# Patient Record
Sex: FEMALE | Race: WHITE | Employment: OTHER | URBAN - METROPOLITAN AREA
[De-identification: names, ages, dates, MRNs, and addresses within clinical notes are randomized per-mention and may not be internally consistent; named-entity substitution may affect disease eponyms.]

---

## 2017-05-06 ENCOUNTER — HOSPITAL ENCOUNTER (OUTPATIENT)
Dept: CT IMAGING | Age: 67
Discharge: HOME | End: 2017-05-06
Attending: INTERNAL MEDICINE
Payer: MEDICARE

## 2017-05-06 DIAGNOSIS — J32.9 CHRONIC SINUSITIS: ICD-10-CM

## 2017-05-06 PROCEDURE — 70486 CT MAXILLOFACIAL W/O DYE: CPT

## 2017-10-27 ENCOUNTER — APPOINTMENT (OUTPATIENT)
Dept: INFUSION THERAPY | Age: 67
End: 2017-10-27

## 2017-11-22 ENCOUNTER — HOSPITAL ENCOUNTER (OUTPATIENT)
Dept: INFUSION THERAPY | Age: 67
Discharge: HOME | End: 2017-11-22
Payer: MEDICARE

## 2017-11-22 VITALS
RESPIRATION RATE: 16 BRPM | HEIGHT: 64 IN | TEMPERATURE: 97.8 F | SYSTOLIC BLOOD PRESSURE: 123 MMHG | HEART RATE: 86 BPM | BODY MASS INDEX: 21.08 KG/M2 | DIASTOLIC BLOOD PRESSURE: 68 MMHG | WEIGHT: 123.5 LBS

## 2017-11-22 PROCEDURE — 74011250637 HC RX REV CODE- 250/637: Performed by: ALLERGY & IMMUNOLOGY

## 2017-11-22 PROCEDURE — 74011250636 HC RX REV CODE- 250/636: Performed by: ALLERGY & IMMUNOLOGY

## 2017-11-22 PROCEDURE — 96365 THER/PROPH/DIAG IV INF INIT: CPT

## 2017-11-22 PROCEDURE — 96366 THER/PROPH/DIAG IV INF ADDON: CPT

## 2017-11-22 RX ORDER — DIPHENHYDRAMINE HYDROCHLORIDE 50 MG/ML
50 INJECTION, SOLUTION INTRAMUSCULAR; INTRAVENOUS
Status: ACTIVE | OUTPATIENT
Start: 2017-11-22 | End: 2017-11-22

## 2017-11-22 RX ORDER — ACETAMINOPHEN 325 MG/1
650 TABLET ORAL ONCE
Status: COMPLETED | OUTPATIENT
Start: 2017-11-22 | End: 2017-11-22

## 2017-11-22 RX ORDER — METFORMIN HYDROCHLORIDE 750 MG/1
750 TABLET, EXTENDED RELEASE ORAL
COMMUNITY

## 2017-11-22 RX ORDER — LANOLIN ALCOHOL/MO/W.PET/CERES
400 CREAM (GRAM) TOPICAL DAILY
COMMUNITY

## 2017-11-22 RX ORDER — SODIUM CHLORIDE 0.9 % (FLUSH) 0.9 %
10-40 SYRINGE (ML) INJECTION AS NEEDED
Status: DISCONTINUED | OUTPATIENT
Start: 2017-11-22 | End: 2017-11-26 | Stop reason: HOSPADM

## 2017-11-22 RX ORDER — CETIRIZINE HCL 10 MG
10 TABLET ORAL DAILY
COMMUNITY

## 2017-11-22 RX ORDER — DIPHENHYDRAMINE HCL 25 MG
25 CAPSULE ORAL ONCE
Status: COMPLETED | OUTPATIENT
Start: 2017-11-22 | End: 2017-11-22

## 2017-11-22 RX ORDER — MONTELUKAST SODIUM 10 MG/1
10 TABLET ORAL DAILY
COMMUNITY

## 2017-11-22 RX ORDER — RANITIDINE 150 MG/1
150 TABLET, FILM COATED ORAL 2 TIMES DAILY
COMMUNITY
End: 2019-07-11

## 2017-11-22 RX ORDER — ACETAMINOPHEN 325 MG/1
650 TABLET ORAL
Status: ACTIVE | OUTPATIENT
Start: 2017-11-22 | End: 2017-11-22

## 2017-11-22 RX ADMIN — IMMUNE GLOBULIN INTRAVENOUS (HUMAN) 10 G: 5 INJECTION, SOLUTION INTRAVENOUS at 13:35

## 2017-11-22 RX ADMIN — IMMUNE GLOBULIN INTRAVENOUS (HUMAN) 20 G: 5 INJECTION, SOLUTION INTRAVENOUS at 11:45

## 2017-11-22 RX ADMIN — ACETAMINOPHEN 650 MG: 325 TABLET ORAL at 11:03

## 2017-11-22 RX ADMIN — DIPHENHYDRAMINE HYDROCHLORIDE 25 MG: 25 CAPSULE ORAL at 11:03

## 2017-11-22 RX ADMIN — Medication 10 ML: at 10:55

## 2017-11-22 RX ADMIN — Medication 10 ML: at 11:44

## 2017-11-22 RX ADMIN — Medication 20 ML: at 14:10

## 2017-11-22 NOTE — PROGRESS NOTES
[facility] OPI Progress Note    Date: 2017    Name: Mandie Parnell    MRN: [id number]         : 1950      Ms. Ezekiel Olivera arrived in the [facility] today, at 56, in stable condition, here for Dose # 1, IVIG Infusion. She was assessed and education was provided. Ms. Rozina Mao vitals were reviewed. Visit Vitals    /73 (BP 1 Location: Left arm, BP Patient Position: At rest;Sitting)    Pulse 81    Temp 98.1 °F (36.7 °C)    Resp 16    Ht 5' 4\" (1.626 m)    Wt 56 kg (123 lb 8 oz)    Breastfeeding No    BMI 21.2 kg/m2             PIV was established in her left AC at 1055, without incident. Pre-medications consisting of PO Tylenol 650 mg & PO Benadryl 25 mg, were administered per order, and without incident. IVIG 30 grams, was administered IV, per order, and without incident. After completion of the IVIG, the PIV was flushed very well per protocol, and then, the PIV was removed and gauze/bandaid was applied. Ms. Ezekiel Olivera tolerated well, and had no complaints. Ms. Ezekiel Olivera was discharged from [place] [address] in stable condition at 1430Nava Lambert She is to return in 4 weeks, on Wednesday, 17,  at 1000,  for her next appointment, for Dose # 2, IVIG Infusion.      Ava Day RN  2017  10:48 AM

## 2017-12-18 RX ORDER — DIPHENHYDRAMINE HCL 25 MG
25 CAPSULE ORAL ONCE
Status: CANCELLED | OUTPATIENT
Start: 2017-12-20 | End: 2017-12-20

## 2017-12-18 RX ORDER — ACETAMINOPHEN 325 MG/1
650 TABLET ORAL ONCE
Status: CANCELLED | OUTPATIENT
Start: 2017-12-20 | End: 2017-12-20

## 2017-12-20 ENCOUNTER — HOSPITAL ENCOUNTER (OUTPATIENT)
Dept: INFUSION THERAPY | Age: 67
Discharge: HOME | End: 2017-12-20
Payer: MEDICARE

## 2017-12-20 VITALS
HEIGHT: 64 IN | TEMPERATURE: 98.1 F | BODY MASS INDEX: 20.49 KG/M2 | OXYGEN SATURATION: 100 % | SYSTOLIC BLOOD PRESSURE: 144 MMHG | RESPIRATION RATE: 16 BRPM | DIASTOLIC BLOOD PRESSURE: 81 MMHG | HEART RATE: 81 BPM | WEIGHT: 120 LBS

## 2017-12-20 PROCEDURE — 74011250636 HC RX REV CODE- 250/636: Performed by: ALLERGY & IMMUNOLOGY

## 2017-12-20 PROCEDURE — 96365 THER/PROPH/DIAG IV INF INIT: CPT

## 2017-12-20 PROCEDURE — 96366 THER/PROPH/DIAG IV INF ADDON: CPT

## 2017-12-20 RX ORDER — SODIUM CHLORIDE 0.9 % (FLUSH) 0.9 %
10-40 SYRINGE (ML) INJECTION AS NEEDED
Status: DISCONTINUED | OUTPATIENT
Start: 2017-12-20 | End: 2017-12-24 | Stop reason: HOSPADM

## 2017-12-20 RX ADMIN — IMMUNE GLOBULIN INTRAVENOUS (HUMAN) 10 G: 5 INJECTION, SOLUTION INTRAVENOUS at 11:46

## 2017-12-20 RX ADMIN — IMMUNE GLOBULIN INTRAVENOUS (HUMAN) 20 G: 5 INJECTION, SOLUTION INTRAVENOUS at 10:26

## 2017-12-20 RX ADMIN — IMMUNE GLOBULIN INTRAVENOUS (HUMAN) 10 G: 5 INJECTION, SOLUTION INTRAVENOUS at 12:02

## 2017-12-20 RX ADMIN — Medication 10 ML: at 12:07

## 2017-12-20 NOTE — PROGRESS NOTES
[facility] OPIC Progress Note    Date: 2017    Name: Chloé Sierra    MRN: [id number]         : 1950      Ms. Inés Mathis arrived to [facility] at 1000. Ms. Inés Mathis was assessed and education was provided. Ms. Antonette Moses vitals were reviewed. Visit Vitals    /81 (BP 1 Location: Right arm, BP Patient Position: At rest;Sitting)    Pulse 81    Temp 98.1 °F (36.7 °C)    Resp 16    Ht 5' 4\" (1.626 m)    Wt 54.4 kg (120 lb)    SpO2 100%    BMI 20.6 kg/m2     Patient Vitals for the past 12 hrs:   Temp Pulse Resp BP SpO2   17 1205 98.1 °F (36.7 °C) 81 16 144/81 -   17 1130 98.4 °F (36.9 °C) 81 16 136/84 100 %   17 1100 98.4 °F (36.9 °C) 81 16 134/83 100 %   17 1045 98 °F (36.7 °C) 75 16 131/82 99 %   17 1005 98.1 °F (36.7 °C) 80 16 142/78 100 %         24g IV inserted in patient's Left antecubital  X one attempt. Positive for blood return/ flushes without difficulty. Patient reports that she did not experience any adverse side effects after her first IVIG infusion. 30 g IVIG titrated and administered as ordered followed by NS flush. Ms. Inés Mathis tolerated well without complaints. IV removed/ intact. Gauze/ coban to site. Ms. Inés Mathis was discharged from [place] [address] in stable condition at 1210. She is to return on 18 at 0900 for her next appointment.     Una Crooks RN  2017

## 2017-12-26 ENCOUNTER — HOSPITAL ENCOUNTER (OUTPATIENT)
Dept: MAMMOGRAPHY | Age: 67
Discharge: HOME | End: 2017-12-26
Attending: INTERNAL MEDICINE
Payer: MEDICARE

## 2017-12-26 DIAGNOSIS — Z12.39 BREAST CANCER SCREENING: ICD-10-CM

## 2017-12-26 PROCEDURE — 77063 BREAST TOMOSYNTHESIS BI: CPT

## 2018-01-15 RX ORDER — DIPHENHYDRAMINE HYDROCHLORIDE 50 MG/ML
50 INJECTION, SOLUTION INTRAMUSCULAR; INTRAVENOUS
Status: CANCELLED | OUTPATIENT
Start: 2018-01-17 | End: 2018-01-17

## 2018-01-15 RX ORDER — DIPHENHYDRAMINE HCL 25 MG
25 CAPSULE ORAL ONCE
Status: CANCELLED | OUTPATIENT
Start: 2018-01-17 | End: 2018-01-17

## 2018-01-15 RX ORDER — ACETAMINOPHEN 325 MG/1
650 TABLET ORAL
Status: CANCELLED | OUTPATIENT
Start: 2018-01-17 | End: 2018-01-17

## 2018-01-15 RX ORDER — ACETAMINOPHEN 325 MG/1
650 TABLET ORAL ONCE
Status: CANCELLED | OUTPATIENT
Start: 2018-01-17 | End: 2018-01-17

## 2018-01-17 ENCOUNTER — HOSPITAL ENCOUNTER (OUTPATIENT)
Dept: INFUSION THERAPY | Age: 68
Discharge: HOME | End: 2018-01-17
Payer: MEDICARE

## 2018-01-17 VITALS
HEART RATE: 78 BPM | WEIGHT: 123.5 LBS | TEMPERATURE: 97.9 F | BODY MASS INDEX: 21.08 KG/M2 | RESPIRATION RATE: 18 BRPM | SYSTOLIC BLOOD PRESSURE: 131 MMHG | HEIGHT: 64 IN | OXYGEN SATURATION: 100 % | DIASTOLIC BLOOD PRESSURE: 82 MMHG

## 2018-01-17 PROCEDURE — 96366 THER/PROPH/DIAG IV INF ADDON: CPT

## 2018-01-17 PROCEDURE — 74011250637 HC RX REV CODE- 250/637: Performed by: ALLERGY & IMMUNOLOGY

## 2018-01-17 PROCEDURE — 74011250636 HC RX REV CODE- 250/636: Performed by: ALLERGY & IMMUNOLOGY

## 2018-01-17 PROCEDURE — 96365 THER/PROPH/DIAG IV INF INIT: CPT

## 2018-01-17 RX ORDER — DIPHENHYDRAMINE HCL 25 MG
25 CAPSULE ORAL ONCE
Status: COMPLETED | OUTPATIENT
Start: 2018-01-17 | End: 2018-01-17

## 2018-01-17 RX ORDER — ACETAMINOPHEN 325 MG/1
650 TABLET ORAL ONCE
Status: COMPLETED | OUTPATIENT
Start: 2018-01-17 | End: 2018-01-17

## 2018-01-17 RX ORDER — DIPHENHYDRAMINE HYDROCHLORIDE 50 MG/ML
50 INJECTION, SOLUTION INTRAMUSCULAR; INTRAVENOUS
Status: ACTIVE | OUTPATIENT
Start: 2018-01-17 | End: 2018-01-17

## 2018-01-17 RX ORDER — SODIUM CHLORIDE 0.9 % (FLUSH) 0.9 %
10-40 SYRINGE (ML) INJECTION AS NEEDED
Status: DISCONTINUED | OUTPATIENT
Start: 2018-01-17 | End: 2018-01-21 | Stop reason: HOSPADM

## 2018-01-17 RX ORDER — SODIUM CHLORIDE 9 MG/ML
25 INJECTION, SOLUTION INTRAVENOUS CONTINUOUS
Status: DISPENSED | OUTPATIENT
Start: 2018-01-17 | End: 2018-01-18

## 2018-01-17 RX ORDER — ACETAMINOPHEN 325 MG/1
650 TABLET ORAL
Status: ACTIVE | OUTPATIENT
Start: 2018-01-17 | End: 2018-01-17

## 2018-01-17 RX ADMIN — Medication 10 ML: at 08:45

## 2018-01-17 RX ADMIN — ACETAMINOPHEN 650 MG: 325 TABLET ORAL at 08:20

## 2018-01-17 RX ADMIN — Medication 10 ML: at 08:20

## 2018-01-17 RX ADMIN — IMMUNE GLOBULIN INTRAVENOUS (HUMAN) 10 G: 5 INJECTION, SOLUTION INTRAVENOUS at 08:44

## 2018-01-17 RX ADMIN — IMMUNE GLOBULIN INTRAVENOUS (HUMAN) 20 G: 5 INJECTION, SOLUTION INTRAVENOUS at 09:57

## 2018-01-17 RX ADMIN — DIPHENHYDRAMINE HYDROCHLORIDE 25 MG: 25 CAPSULE ORAL at 08:20

## 2018-01-17 NOTE — PROGRESS NOTES
[facility] OPIC Progress Note    Date: 2018    Name: Jessica Sullivan    MRN: [id number]         : 1950      Ms. Mariela Gutierrez arrived to [facility] at [address] [facility]. Ms. Mariela Gutierrez was assessed and education was provided. Ms. Mario Alberto Almazan vitals were reviewed. Visit Vitals    /82 (BP 1 Location: Left arm, BP Patient Position: Sitting)    Pulse 78    Temp 97.9 °F (36.6 °C)    Resp 18    Ht 5' 4\" (1.626 m)    Wt 56 kg (123 lb 8 oz)    SpO2 100%    Breastfeeding No    BMI 21.2 kg/m2     Patient Vitals for the past 12 hrs:   Temp Pulse Resp BP SpO2   18 1123 97.9 °F (36.6 °C) 78 18 131/82 100 %   18 1102 98 °F (36.7 °C) 72 18 130/78 100 %   18 1033 98 °F (36.7 °C) 90 18 142/79 95 %   18 0949 98.1 °F (36.7 °C) 72 18 133/81 99 %   18 0914 98 °F (36.7 °C) 71 18 143/78 97 %   18 0807 97.9 °F (36.6 °C) 75 18 136/78 98 %       22g IV inserted in patient's right antecubital  X one attempt. Positive for blood return/ flushes without difficulty. 30 g IVIG titrated and administered as ordered followed by NS flush. Ms. Mariela Gutierrez tolerated well without complaints. IV removed/ intact. Gauze/ coban to site. Ms. Mariela Gutierrez was discharged from [place] [address] in stable condition at 1130. She is to return on 18 at 0900 for her next appointment.     Anabel Coreas RN  2018

## 2018-02-14 ENCOUNTER — HOSPITAL ENCOUNTER (OUTPATIENT)
Dept: INFUSION THERAPY | Age: 68
Discharge: HOME | End: 2018-02-14
Payer: MEDICARE

## 2018-02-14 VITALS
DIASTOLIC BLOOD PRESSURE: 77 MMHG | OXYGEN SATURATION: 100 % | SYSTOLIC BLOOD PRESSURE: 139 MMHG | RESPIRATION RATE: 18 BRPM | TEMPERATURE: 98 F | HEART RATE: 78 BPM

## 2018-02-14 PROCEDURE — 74011250636 HC RX REV CODE- 250/636: Performed by: ALLERGY & IMMUNOLOGY

## 2018-02-14 PROCEDURE — 96366 THER/PROPH/DIAG IV INF ADDON: CPT

## 2018-02-14 PROCEDURE — 74011250637 HC RX REV CODE- 250/637: Performed by: ALLERGY & IMMUNOLOGY

## 2018-02-14 PROCEDURE — 82784 ASSAY IGA/IGD/IGG/IGM EACH: CPT | Performed by: ALLERGY & IMMUNOLOGY

## 2018-02-14 PROCEDURE — 96365 THER/PROPH/DIAG IV INF INIT: CPT

## 2018-02-14 RX ORDER — DIPHENHYDRAMINE HCL 25 MG
25 CAPSULE ORAL ONCE
Status: COMPLETED | OUTPATIENT
Start: 2018-02-14 | End: 2018-02-14

## 2018-02-14 RX ORDER — ACETAMINOPHEN 325 MG/1
650 TABLET ORAL ONCE
Status: COMPLETED | OUTPATIENT
Start: 2018-02-14 | End: 2018-02-14

## 2018-02-14 RX ORDER — DIPHENHYDRAMINE HYDROCHLORIDE 50 MG/ML
50 INJECTION, SOLUTION INTRAMUSCULAR; INTRAVENOUS
Status: ACTIVE | OUTPATIENT
Start: 2018-02-14 | End: 2018-02-14

## 2018-02-14 RX ORDER — ACETAMINOPHEN 325 MG/1
650 TABLET ORAL
Status: ACTIVE | OUTPATIENT
Start: 2018-02-14 | End: 2018-02-14

## 2018-02-14 RX ORDER — SODIUM CHLORIDE 0.9 % (FLUSH) 0.9 %
10-40 SYRINGE (ML) INJECTION AS NEEDED
Status: DISCONTINUED | OUTPATIENT
Start: 2018-02-14 | End: 2018-02-18 | Stop reason: HOSPADM

## 2018-02-14 RX ADMIN — ACETAMINOPHEN 650 MG: 325 TABLET ORAL at 09:44

## 2018-02-14 RX ADMIN — IMMUNE GLOBULIN INTRAVENOUS (HUMAN) 20 G: 5 INJECTION, SOLUTION INTRAVENOUS at 11:22

## 2018-02-14 RX ADMIN — IMMUNE GLOBULIN INTRAVENOUS (HUMAN) 10 G: 5 INJECTION, SOLUTION INTRAVENOUS at 10:10

## 2018-02-14 RX ADMIN — DIPHENHYDRAMINE HYDROCHLORIDE 25 MG: 25 CAPSULE ORAL at 09:44

## 2018-02-14 NOTE — PROGRESS NOTES
[facility] OPIC Progress Note    Date: 2018    Name: Echo Narvaez    MRN: [id number]         : 1950    IVIG    Ms. Lucio Hassan was assessed and education was provided. Ms. Eric Tirado vitals were reviewed and patient was observed for 5 minutes prior to treatment. Visit Vitals    /77 (BP 1 Location: Left arm, BP Patient Position: Sitting)    Pulse 84    Temp 97.9 °F (36.6 °C)    Resp 18    SpO2 98%    Breastfeeding No       22 g PIV placed in the left AC x 1 attempt. PIV flushed easily and had brisk blood return. Blood drawn off sent to lab for IGG level. Premedications (Tylenol 650 mg and Benadryl 25 mg) given 30 minutes prior to infusion. IVIG 30 G infused following ordered titration scheduled without any reaction. Patient Vitals for the past 12 hrs:   Temp Pulse Resp BP SpO2   18 1246 98 °F (36.7 °C) 78 18 139/77 100 %   18 1216 97.6 °F (36.4 °C) 78 18 131/78 100 %   18 1144 97.7 °F (36.5 °C) 75 18 130/78 100 %   18 1111 97.5 °F (36.4 °C) 72 18 120/79 100 %   18 1045 98.4 °F (36.9 °C) 72 19 123/78 100 %   18 0910 97.9 °F (36.6 °C) 84 18 157/77 98 %       VS remained stable at end of infusion and pt denied complaints. PIV flushed with NS 10 ml and removed. No bleeding or hematoma noted at site. Guaze and coban applied. Patient armband removed and shredded. Ms. Lucio Hassan was discharged from [place] [address] in stable condition at 1250. She is to return on 18 at 0900 for her next infusion.      Edi Menon RN  2018

## 2018-02-15 LAB — IGG SERPL-MCNC: 781 MG/DL (ref 700–1600)

## 2018-03-14 ENCOUNTER — APPOINTMENT (OUTPATIENT)
Dept: INFUSION THERAPY | Age: 68
End: 2018-03-14
Payer: MEDICARE

## 2018-03-16 ENCOUNTER — HOSPITAL ENCOUNTER (OUTPATIENT)
Dept: INFUSION THERAPY | Age: 68
Discharge: HOME | End: 2018-03-16
Payer: MEDICARE

## 2018-03-16 VITALS
RESPIRATION RATE: 18 BRPM | SYSTOLIC BLOOD PRESSURE: 127 MMHG | TEMPERATURE: 98.3 F | DIASTOLIC BLOOD PRESSURE: 70 MMHG | OXYGEN SATURATION: 100 % | HEART RATE: 79 BPM

## 2018-03-16 PROCEDURE — 74011250636 HC RX REV CODE- 250/636: Performed by: ALLERGY & IMMUNOLOGY

## 2018-03-16 PROCEDURE — 74011250636 HC RX REV CODE- 250/636

## 2018-03-16 PROCEDURE — 96366 THER/PROPH/DIAG IV INF ADDON: CPT

## 2018-03-16 PROCEDURE — 74011250637 HC RX REV CODE- 250/637: Performed by: ALLERGY & IMMUNOLOGY

## 2018-03-16 PROCEDURE — 96365 THER/PROPH/DIAG IV INF INIT: CPT

## 2018-03-16 RX ORDER — SODIUM CHLORIDE 0.9 % (FLUSH) 0.9 %
10-40 SYRINGE (ML) INJECTION AS NEEDED
Status: DISCONTINUED | OUTPATIENT
Start: 2018-03-16 | End: 2018-03-20 | Stop reason: HOSPADM

## 2018-03-16 RX ORDER — ACETAMINOPHEN 325 MG/1
650 TABLET ORAL ONCE
Status: COMPLETED | OUTPATIENT
Start: 2018-03-16 | End: 2018-03-16

## 2018-03-16 RX ORDER — DIPHENHYDRAMINE HCL 25 MG
25 CAPSULE ORAL
Status: DISCONTINUED | OUTPATIENT
Start: 2018-03-16 | End: 2018-03-20 | Stop reason: HOSPADM

## 2018-03-16 RX ORDER — CHLORPHENIRAMINE MALEATE 4 MG
4 TABLET ORAL
COMMUNITY

## 2018-03-16 RX ADMIN — ACETAMINOPHEN 650 MG: 325 TABLET ORAL at 09:51

## 2018-03-16 RX ADMIN — Medication 10 ML: at 12:43

## 2018-03-16 RX ADMIN — IMMUNE GLOBULIN INTRAVENOUS (HUMAN) 20 G: 5 INJECTION, SOLUTION INTRAVENOUS at 11:15

## 2018-03-16 RX ADMIN — DIPHENHYDRAMINE HYDROCHLORIDE 25 MG: 25 CAPSULE ORAL at 09:51

## 2018-03-16 NOTE — PROGRESS NOTES
[facility] OPIC Progress Note    Date: 2018    Name: Kayden Coy    MRN: [id number]         : 1950     IVIG infusion 5 of 13      Ms. Erica Acuna was assessed and education was provided. Denies any problems with infusions. States that her accucheck this morning was 12498 56 80 46    Ms. Orozco's vitals were reviewed and patient was observed for 5 minutes prior to treatment. Visit Vitals    /70 (BP 1 Location: Left arm, BP Patient Position: Sitting)    Pulse 79    Temp 98.3 °F (36.8 °C)    Resp 18    SpO2 100%    Breastfeeding No     Patient Vitals for the past 12 hrs:   Temp Pulse Resp BP SpO2   18 1243 98.3 °F (36.8 °C) 79 18 127/70 -   18 1143 98.2 °F (36.8 °C) 79 18 134/74 -   18 1110 98.1 °F (36.7 °C) 69 18 130/72 -   18 1038 98.1 °F (36.7 °C) 69 18 138/75 -   18 0922 98.3 °F (36.8 °C) 88 18 125/73 100 %     IV started in LAC w/o difficulty w/22 gauge INT. Hu Angry Good blood return obtained followed with 10 ml NS flush. .  No results found for this or any previous visit (from the past 12 hour(s)). Pre-medications of oral Tylenol 650 mg, oral Benadryl 25 mgwere administered as ordered and infusion was initiated. IVIG 30 grams was infused at 43 ml/hr x 30 minutes, 86 ml/hr x 30 minutes, 130 ml/hr x 30 minutes, 173 ml/hr x 30 minutes, 216 ml/hr until infusion completed. No s/s reaction noted. IV flushe dwith 10 ml NS and removed. No irritation noted at site, 2x2 guaze and Coban applied. Ms. Erica Acuna tolerated the infusion, and had no complaints. Patient armband removed and shredded. Ms. Erica Acuna was discharged from [place] [address] in stable condition at 1240. She is to return on 2018 at 0900 for her next appointment for IVIG infusion.     Leatha Weaver RN  2018  3:00 PM

## 2018-04-11 ENCOUNTER — APPOINTMENT (OUTPATIENT)
Dept: INFUSION THERAPY | Age: 68
End: 2018-04-11
Payer: MEDICARE

## 2018-04-16 ENCOUNTER — HOSPITAL ENCOUNTER (OUTPATIENT)
Dept: INFUSION THERAPY | Age: 68
Discharge: HOME | End: 2018-04-16
Payer: MEDICARE

## 2018-04-16 ENCOUNTER — APPOINTMENT (OUTPATIENT)
Dept: INFUSION THERAPY | Age: 68
End: 2018-04-16

## 2018-04-16 VITALS
OXYGEN SATURATION: 99 % | RESPIRATION RATE: 18 BRPM | SYSTOLIC BLOOD PRESSURE: 145 MMHG | DIASTOLIC BLOOD PRESSURE: 77 MMHG | HEART RATE: 76 BPM | TEMPERATURE: 97.8 F

## 2018-04-16 PROCEDURE — 74011250636 HC RX REV CODE- 250/636: Performed by: ALLERGY & IMMUNOLOGY

## 2018-04-16 PROCEDURE — 96365 THER/PROPH/DIAG IV INF INIT: CPT

## 2018-04-16 PROCEDURE — 74011250637 HC RX REV CODE- 250/637: Performed by: ALLERGY & IMMUNOLOGY

## 2018-04-16 PROCEDURE — 96366 THER/PROPH/DIAG IV INF ADDON: CPT

## 2018-04-16 RX ORDER — SODIUM CHLORIDE 0.9 % (FLUSH) 0.9 %
10-40 SYRINGE (ML) INJECTION AS NEEDED
Status: DISCONTINUED | OUTPATIENT
Start: 2018-04-16 | End: 2018-04-20 | Stop reason: HOSPADM

## 2018-04-16 RX ORDER — ACETAMINOPHEN 325 MG/1
650 TABLET ORAL ONCE
Status: COMPLETED | OUTPATIENT
Start: 2018-04-16 | End: 2018-04-16

## 2018-04-16 RX ORDER — ACETAMINOPHEN 325 MG/1
650 TABLET ORAL
Status: ACTIVE | OUTPATIENT
Start: 2018-04-16 | End: 2018-04-16

## 2018-04-16 RX ORDER — DIPHENHYDRAMINE HYDROCHLORIDE 50 MG/ML
50 INJECTION, SOLUTION INTRAMUSCULAR; INTRAVENOUS
Status: ACTIVE | OUTPATIENT
Start: 2018-04-16 | End: 2018-04-16

## 2018-04-16 RX ORDER — DIPHENHYDRAMINE HCL 25 MG
25 CAPSULE ORAL ONCE
Status: COMPLETED | OUTPATIENT
Start: 2018-04-16 | End: 2018-04-16

## 2018-04-16 RX ADMIN — DIPHENHYDRAMINE HYDROCHLORIDE 25 MG: 25 CAPSULE ORAL at 09:19

## 2018-04-16 RX ADMIN — IMMUNE GLOBULIN INTRAVENOUS (HUMAN) 20 G: 5 INJECTION, SOLUTION INTRAVENOUS at 11:11

## 2018-04-16 RX ADMIN — Medication 10 ML: at 09:20

## 2018-04-16 RX ADMIN — ACETAMINOPHEN 650 MG: 325 TABLET ORAL at 09:19

## 2018-04-16 RX ADMIN — IMMUNE GLOBULIN INTRAVENOUS (HUMAN) 10 G: 5 INJECTION, SOLUTION INTRAVENOUS at 09:56

## 2018-04-16 NOTE — PROGRESS NOTES
[facility] OPIC Progress Note    Date: 2018    Name: Yomi Thomas    MRN: [id number]         : 1950     IVIG infusion 6 of 13      Ms. Simona Johnson was assessed and education was provided. Denies any problems with infusions. Ms. Amanda Avina vitals were reviewed and patient was observed for 5 minutes prior to treatment. Visit Vitals    /79 (BP 1 Location: Left arm, BP Patient Position: Sitting)    Pulse 81    Temp 97.7 °F (36.5 °C)    Resp 18    SpO2 98%    Breastfeeding No     22G PIV inserted into the left AC x 1 attempt. Flushes without difficulty and positive for blood return. Pre-medications of oral Tylenol 650 mg, oral Benadryl 25 mgwere administered as ordered and infusion was initiated. IVIG 30 grams was infused at 43 ml/hr x 30 minutes, 86 ml/hr x 30 minutes, 130 ml/hr x 30 minutes, 173 ml/hr x 30 minutes, 216 ml/hr until infusion completed. No s/s reaction noted. IV flushed with 10 ml NS and removed. No irritation noted at site, 2x2 guaze and Coban applied. Ms. Simona Johnson tolerated the infusion, and had no complaints. Patient armband removed and shredded. Ms. Simona Johnson was discharged from [place] [address] in stable condition at 1250. She is to return on 2018 at 0800 for her next appointment for IVIG infusion.     Madai Duarte RN  2018

## 2018-04-19 ENCOUNTER — HOSPITAL ENCOUNTER (OUTPATIENT)
Dept: INFUSION THERAPY | Age: 68
End: 2018-04-19
Payer: MEDICARE

## 2018-05-09 RX ORDER — ACETAMINOPHEN 325 MG/1
650 TABLET ORAL
Status: CANCELLED | OUTPATIENT
Start: 2018-05-14 | End: 2018-05-14

## 2018-05-09 RX ORDER — DIPHENHYDRAMINE HCL 25 MG
25 CAPSULE ORAL ONCE
Status: CANCELLED | OUTPATIENT
Start: 2018-05-14 | End: 2018-05-14

## 2018-05-09 RX ORDER — DIPHENHYDRAMINE HYDROCHLORIDE 50 MG/ML
50 INJECTION, SOLUTION INTRAMUSCULAR; INTRAVENOUS
Status: CANCELLED | OUTPATIENT
Start: 2018-05-14 | End: 2018-05-14

## 2018-05-09 RX ORDER — ACETAMINOPHEN 325 MG/1
650 TABLET ORAL ONCE
Status: CANCELLED | OUTPATIENT
Start: 2018-05-14 | End: 2018-05-14

## 2018-05-14 ENCOUNTER — HOSPITAL ENCOUNTER (OUTPATIENT)
Dept: INFUSION THERAPY | Age: 68
Discharge: HOME | End: 2018-05-14
Payer: MEDICARE

## 2018-05-14 RX ORDER — ACETAMINOPHEN 325 MG/1
650 TABLET ORAL
Status: ACTIVE | OUTPATIENT
Start: 2018-05-14 | End: 2018-05-14

## 2018-05-14 RX ORDER — DIPHENHYDRAMINE HCL 25 MG
25 CAPSULE ORAL ONCE
Status: ACTIVE | OUTPATIENT
Start: 2018-05-14 | End: 2018-05-14

## 2018-05-14 RX ORDER — DIPHENHYDRAMINE HYDROCHLORIDE 50 MG/ML
50 INJECTION, SOLUTION INTRAMUSCULAR; INTRAVENOUS
Status: ACTIVE | OUTPATIENT
Start: 2018-05-14 | End: 2018-05-14

## 2018-05-14 RX ORDER — ACETAMINOPHEN 325 MG/1
650 TABLET ORAL ONCE
Status: ACTIVE | OUTPATIENT
Start: 2018-05-14 | End: 2018-05-14

## 2018-05-18 ENCOUNTER — HOSPITAL ENCOUNTER (OUTPATIENT)
Dept: INFUSION THERAPY | Age: 68
Discharge: HOME | End: 2018-05-18
Payer: MEDICARE

## 2018-05-18 ENCOUNTER — APPOINTMENT (OUTPATIENT)
Dept: INFUSION THERAPY | Age: 68
End: 2018-05-18
Payer: MEDICARE

## 2018-05-18 VITALS
OXYGEN SATURATION: 100 % | TEMPERATURE: 98.1 F | DIASTOLIC BLOOD PRESSURE: 72 MMHG | SYSTOLIC BLOOD PRESSURE: 121 MMHG | RESPIRATION RATE: 18 BRPM | HEART RATE: 77 BPM

## 2018-05-18 PROCEDURE — 74011250636 HC RX REV CODE- 250/636: Performed by: ALLERGY & IMMUNOLOGY

## 2018-05-18 PROCEDURE — 96366 THER/PROPH/DIAG IV INF ADDON: CPT

## 2018-05-18 PROCEDURE — 74011250637 HC RX REV CODE- 250/637: Performed by: ALLERGY & IMMUNOLOGY

## 2018-05-18 PROCEDURE — 96365 THER/PROPH/DIAG IV INF INIT: CPT

## 2018-05-18 RX ORDER — DIPHENHYDRAMINE HYDROCHLORIDE 50 MG/ML
50 INJECTION, SOLUTION INTRAMUSCULAR; INTRAVENOUS
Status: ACTIVE | OUTPATIENT
Start: 2018-05-18 | End: 2018-05-18

## 2018-05-18 RX ORDER — DIPHENHYDRAMINE HCL 25 MG
25 CAPSULE ORAL ONCE
Status: COMPLETED | OUTPATIENT
Start: 2018-05-18 | End: 2018-05-18

## 2018-05-18 RX ORDER — ACETAMINOPHEN 325 MG/1
650 TABLET ORAL
Status: ACTIVE | OUTPATIENT
Start: 2018-05-18 | End: 2018-05-18

## 2018-05-18 RX ORDER — SODIUM CHLORIDE 0.9 % (FLUSH) 0.9 %
10-40 SYRINGE (ML) INJECTION AS NEEDED
Status: DISCONTINUED | OUTPATIENT
Start: 2018-05-18 | End: 2018-05-22 | Stop reason: HOSPADM

## 2018-05-18 RX ORDER — ACETAMINOPHEN 325 MG/1
650 TABLET ORAL ONCE
Status: DISPENSED | OUTPATIENT
Start: 2018-05-18 | End: 2018-05-18

## 2018-05-18 RX ADMIN — IMMUNE GLOBULIN INTRAVENOUS (HUMAN) 20 G: 5 INJECTION, SOLUTION INTRAVENOUS at 09:59

## 2018-05-18 RX ADMIN — Medication 10 ML: at 08:35

## 2018-05-18 RX ADMIN — HUMAN IMMUNOGLOBULIN G 10 G: 10 LIQUID INTRAVENOUS at 08:52

## 2018-05-18 RX ADMIN — DIPHENHYDRAMINE HYDROCHLORIDE 25 MG: 25 CAPSULE ORAL at 08:41

## 2018-05-18 RX ADMIN — Medication 10 ML: at 11:32

## 2018-05-18 RX ADMIN — ACETAMINOPHEN 650 MG: 325 TABLET ORAL at 08:40

## 2018-05-18 NOTE — PROGRESS NOTES
[facility] OPIC Progress Note    Date: May 18, 2018    Name: Amari Khan    MRN: [id number]         : 1950     IVIG infusion every 4 weeks      Ms. Gladys Vaughn was assessed and education was provided. Denies any problems with infusions. Ms. Lucy Singleton vitals were reviewed and patient was observed for 5 minutes prior to treatment. Visit Vitals    /72 (BP 1 Location: Left arm, BP Patient Position: Sitting)    Pulse 77    Temp 98.1 °F (36.7 °C)    Resp 18    SpO2 100%    Breastfeeding No     Patient Vitals for the past 12 hrs:   Temp Pulse Resp BP SpO2   18 1132 - 77 18 121/72 -   18 1102 - 76 18 122/70 -   18 1031 - 71 18 115/70 -   18 1002 - 69 18 122/75 -   18 0925 98.1 °F (36.7 °C) 73 18 130/76 -   18 0816 98.1 °F (36.7 °C) 77 18 132/80 100 %     IV started in RAC w/ 22 gauge INT w/o difficulty. Good blood returned obtained, followed with 10 ml NS flush. No results found for this or any previous visit (from the past 12 hour(s)). Pre-medications of oral Tylenol 650 mg, oral Benadryl 25 mg were administered as ordered and IVIG was initiated. IV Immune Globulin 10 % 30 grams/ 300 ml was infused at 43 ml/hr x 30 minutes, 86 ml/hr x 30 minutes, 130 ml/hr x 30 minutes, 173 ml/hr x 30 minutes 216 ml/hr x 30 minutes. No s/s reaction noted. IV flushed with 10 ml NS and removed. No irritation or drainage noted at site, 2x2 gauze and Coban applied. Ms. Gladys Vaughn tolerated the infusion, and had no complaints. Patient armband removed and shredded. Ms. Gladys Vaughn was discharged from [place] [address] in stable condition at 1135. She is to return on 6/15/2018 at 0800 for her next appointment for IVIG infusion.     Patel Mckinley RN  May 18, 2018  12:22 PM

## 2018-06-15 ENCOUNTER — HOSPITAL ENCOUNTER (OUTPATIENT)
Dept: INFUSION THERAPY | Age: 68
Discharge: HOME | End: 2018-06-15
Payer: MEDICARE

## 2018-06-15 VITALS
RESPIRATION RATE: 18 BRPM | DIASTOLIC BLOOD PRESSURE: 77 MMHG | TEMPERATURE: 97.8 F | HEART RATE: 90 BPM | SYSTOLIC BLOOD PRESSURE: 146 MMHG | OXYGEN SATURATION: 100 %

## 2018-06-15 PROCEDURE — 96365 THER/PROPH/DIAG IV INF INIT: CPT

## 2018-06-15 PROCEDURE — 96366 THER/PROPH/DIAG IV INF ADDON: CPT

## 2018-06-15 PROCEDURE — 74011250636 HC RX REV CODE- 250/636: Performed by: ALLERGY & IMMUNOLOGY

## 2018-06-15 PROCEDURE — 74011250637 HC RX REV CODE- 250/637: Performed by: ALLERGY & IMMUNOLOGY

## 2018-06-15 RX ORDER — DIPHENHYDRAMINE HYDROCHLORIDE 50 MG/ML
50 INJECTION, SOLUTION INTRAMUSCULAR; INTRAVENOUS
Status: ACTIVE | OUTPATIENT
Start: 2018-06-15 | End: 2018-06-15

## 2018-06-15 RX ORDER — ACETAMINOPHEN 325 MG/1
650 TABLET ORAL
Status: ACTIVE | OUTPATIENT
Start: 2018-06-15 | End: 2018-06-15

## 2018-06-15 RX ORDER — DIPHENHYDRAMINE HCL 25 MG
25 CAPSULE ORAL ONCE
Status: COMPLETED | OUTPATIENT
Start: 2018-06-15 | End: 2018-06-15

## 2018-06-15 RX ORDER — ACETAMINOPHEN 325 MG/1
650 TABLET ORAL ONCE
Status: COMPLETED | OUTPATIENT
Start: 2018-06-15 | End: 2018-06-15

## 2018-06-15 RX ORDER — SODIUM CHLORIDE 0.9 % (FLUSH) 0.9 %
10-40 SYRINGE (ML) INJECTION AS NEEDED
Status: DISCONTINUED | OUTPATIENT
Start: 2018-06-15 | End: 2018-06-19 | Stop reason: HOSPADM

## 2018-06-15 RX ADMIN — IMMUNE GLOBULIN INTRAVENOUS (HUMAN) 10 G: 5 INJECTION, SOLUTION INTRAVENOUS at 11:00

## 2018-06-15 RX ADMIN — IMMUNE GLOBULIN INTRAVENOUS (HUMAN) 10 G: 5 INJECTION, SOLUTION INTRAVENOUS at 08:50

## 2018-06-15 RX ADMIN — ACETAMINOPHEN 650 MG: 325 TABLET ORAL at 08:49

## 2018-06-15 RX ADMIN — IMMUNE GLOBULIN INTRAVENOUS (HUMAN) 10 G: 5 INJECTION, SOLUTION INTRAVENOUS at 10:10

## 2018-06-15 RX ADMIN — Medication 10 ML: at 08:33

## 2018-06-15 RX ADMIN — Medication 20 ML: at 11:37

## 2018-06-15 RX ADMIN — DIPHENHYDRAMINE HYDROCHLORIDE 25 MG: 25 CAPSULE ORAL at 08:49

## 2018-06-15 NOTE — PROGRESS NOTES
[facility] OPIC Progress Note    Date: Belen 15, 2018    Name: Afshin Gross    MRN: [id number]         : 1950     IVIG infusion every 4 weeks      Ms. Lynn Banda was assessed and education was provided. States has had more energy lately. Ms. Arnaldo Aquino vitals were reviewed and patient was observed for 5 minutes prior to treatment. Visit Vitals    /77    Pulse 90    Temp 97.8 °F (36.6 °C)    Resp 18    SpO2 100%    Breastfeeding No     No results found for this or any previous visit (from the past 12 hour(s)). IV started with 22 gauge INT in LAC w/o difficulty. Good blood return obtained, followed with 10 ml NS flush. Pre-medications of oral Tylenol 650 mg, oral Benadryl 25 mg were administered as ordered and infusion was initiated. IVIG 10 % 30 grams/300 ml was infused at 43 ml/hr x 30 minutes, 86 ml/hr x 30 minutes, 130 ml/hr x 30 minutes, 173 ml/hr x 30 minutes, 216 ml/hr until infusion completed. No s/s reaction noted. IV flushed with 20 ml NS and removed. No irritation or drainage noted at site, 2x2 gauze and Coban applied. Ms. Lynn Banda tolerated the infusion, and had no complaints. Patient armband removed and shredded. Ms. Lynn Banda was discharged from [place] [address] in stable condition at 1140. She is to return on 2018 at 0800 for her next appointment for IVIG infusion.     Alexis Alonzo RN  Belen 15, 2018  12:03 PM

## 2018-06-18 ENCOUNTER — APPOINTMENT (OUTPATIENT)
Dept: INFUSION THERAPY | Age: 68
End: 2018-06-18
Payer: MEDICARE

## 2018-07-13 ENCOUNTER — HOSPITAL ENCOUNTER (OUTPATIENT)
Dept: INFUSION THERAPY | Age: 68
Discharge: HOME | End: 2018-07-13
Payer: MEDICARE

## 2018-07-13 VITALS
DIASTOLIC BLOOD PRESSURE: 77 MMHG | SYSTOLIC BLOOD PRESSURE: 127 MMHG | RESPIRATION RATE: 18 BRPM | HEART RATE: 69 BPM | OXYGEN SATURATION: 100 % | TEMPERATURE: 98.1 F

## 2018-07-13 PROCEDURE — 96365 THER/PROPH/DIAG IV INF INIT: CPT

## 2018-07-13 PROCEDURE — 96366 THER/PROPH/DIAG IV INF ADDON: CPT

## 2018-07-13 PROCEDURE — 74011250636 HC RX REV CODE- 250/636: Performed by: ALLERGY & IMMUNOLOGY

## 2018-07-13 PROCEDURE — 74011250637 HC RX REV CODE- 250/637: Performed by: ALLERGY & IMMUNOLOGY

## 2018-07-13 RX ORDER — DIPHENHYDRAMINE HYDROCHLORIDE 50 MG/ML
50 INJECTION, SOLUTION INTRAMUSCULAR; INTRAVENOUS
Status: ACTIVE | OUTPATIENT
Start: 2018-07-13 | End: 2018-07-13

## 2018-07-13 RX ORDER — SODIUM CHLORIDE 0.9 % (FLUSH) 0.9 %
5-10 SYRINGE (ML) INJECTION EVERY 8 HOURS
Status: DISCONTINUED | OUTPATIENT
Start: 2018-07-13 | End: 2018-07-17 | Stop reason: HOSPADM

## 2018-07-13 RX ORDER — DIPHENHYDRAMINE HCL 25 MG
25 CAPSULE ORAL ONCE
Status: COMPLETED | OUTPATIENT
Start: 2018-07-13 | End: 2018-07-13

## 2018-07-13 RX ORDER — ACETAMINOPHEN 325 MG/1
650 TABLET ORAL
Status: ACTIVE | OUTPATIENT
Start: 2018-07-13 | End: 2018-07-13

## 2018-07-13 RX ORDER — ACETAMINOPHEN 325 MG/1
650 TABLET ORAL ONCE
Status: COMPLETED | OUTPATIENT
Start: 2018-07-13 | End: 2018-07-13

## 2018-07-13 RX ADMIN — IMMUNE GLOBULIN INTRAVENOUS (HUMAN) 20 G: 5 INJECTION, SOLUTION INTRAVENOUS at 10:27

## 2018-07-13 RX ADMIN — Medication 10 ML: at 11:53

## 2018-07-13 RX ADMIN — IMMUNE GLOBULIN INTRAVENOUS (HUMAN) 10 G: 5 INJECTION, SOLUTION INTRAVENOUS at 09:14

## 2018-07-13 RX ADMIN — ACETAMINOPHEN 650 MG: 325 TABLET ORAL at 08:27

## 2018-07-13 RX ADMIN — DIPHENHYDRAMINE HYDROCHLORIDE 25 MG: 25 CAPSULE ORAL at 08:26

## 2018-07-13 RX ADMIN — Medication 10 ML: at 08:26

## 2018-07-13 NOTE — PROGRESS NOTES
[facility] OPIC Progress Note    Date: 2018    Name: Morro Holloway    MRN: [id number]         : 1950     IVIG infusion every 4 weeks      Ms. Preet Huntley was assessed and education was provided. Ms. Mounika Cabral vitals were reviewed and patient was observed for 5 minutes prior to treatment. Visit Vitals    /77 (BP 1 Location: Left arm, BP Patient Position: At rest;Sitting)    Pulse 69    Temp 98.1 °F (36.7 °C)    Resp 18    SpO2 100%    Breastfeeding No     No results found for this or any previous visit (from the past 12 hour(s)). IV started with 22 gauge INT in RAC w/o difficulty. Good blood return obtained, followed with 10 ml NS flush. Pre-medications of oral Tylenol 650 mg, oral Benadryl 25 mg were administered as ordered and infusion was initiated. IVIG 10 % 30 grams/300 ml was infused at 43 ml/hr x 30 minutes, 86 ml/hr x 30 minutes, 130 ml/hr x 30 minutes, 173 ml/hr x 30 minutes, 216 ml/hr until infusion completed. No s/s reaction noted. IV flushed with 20 ml NS and removed. No irritation or drainage noted at site, 2x2 gauze and Coban applied. Ms. Preet Huntley tolerated the infusion, and had no complaints. Patient armband removed and shredded. Ms. Preet Huntley was discharged from [place] [address] in stable condition at 1155. She is to return on 8/10/2018 at 0800 for her next appointment for IVIG infusion.     Tammy Ny RN  2018  1778

## 2018-08-07 ENCOUNTER — HOSPITAL ENCOUNTER (OUTPATIENT)
Dept: LAB | Age: 68
Discharge: HOME | End: 2018-08-07
Payer: MEDICARE

## 2018-08-07 PROCEDURE — 88331 PATH CONSLTJ SURG 1 BLK 1SPC: CPT | Performed by: PLASTIC SURGERY

## 2018-08-07 PROCEDURE — 88305 TISSUE EXAM BY PATHOLOGIST: CPT | Performed by: PLASTIC SURGERY

## 2018-08-10 ENCOUNTER — HOSPITAL ENCOUNTER (OUTPATIENT)
Dept: INFUSION THERAPY | Age: 68
Discharge: HOME | End: 2018-08-10
Payer: MEDICARE

## 2018-08-10 VITALS
OXYGEN SATURATION: 100 % | BODY MASS INDEX: 20.41 KG/M2 | DIASTOLIC BLOOD PRESSURE: 83 MMHG | HEART RATE: 72 BPM | RESPIRATION RATE: 18 BRPM | TEMPERATURE: 98.3 F | WEIGHT: 118.9 LBS | SYSTOLIC BLOOD PRESSURE: 127 MMHG

## 2018-08-10 PROCEDURE — 96366 THER/PROPH/DIAG IV INF ADDON: CPT

## 2018-08-10 PROCEDURE — 96365 THER/PROPH/DIAG IV INF INIT: CPT

## 2018-08-10 PROCEDURE — 74011250637 HC RX REV CODE- 250/637: Performed by: ALLERGY & IMMUNOLOGY

## 2018-08-10 PROCEDURE — 74011250636 HC RX REV CODE- 250/636: Performed by: ALLERGY & IMMUNOLOGY

## 2018-08-10 RX ORDER — SODIUM CHLORIDE 0.9 % (FLUSH) 0.9 %
10-40 SYRINGE (ML) INJECTION AS NEEDED
Status: DISCONTINUED | OUTPATIENT
Start: 2018-08-10 | End: 2018-08-14 | Stop reason: HOSPADM

## 2018-08-10 RX ORDER — ACETAMINOPHEN 325 MG/1
650 TABLET ORAL
Status: ACTIVE | OUTPATIENT
Start: 2018-08-10 | End: 2018-08-10

## 2018-08-10 RX ORDER — ACETAMINOPHEN 325 MG/1
650 TABLET ORAL ONCE
Status: COMPLETED | OUTPATIENT
Start: 2018-08-10 | End: 2018-08-10

## 2018-08-10 RX ORDER — DIPHENHYDRAMINE HCL 25 MG
25 CAPSULE ORAL ONCE
Status: COMPLETED | OUTPATIENT
Start: 2018-08-10 | End: 2018-08-10

## 2018-08-10 RX ORDER — DIPHENHYDRAMINE HYDROCHLORIDE 50 MG/ML
50 INJECTION, SOLUTION INTRAMUSCULAR; INTRAVENOUS
Status: ACTIVE | OUTPATIENT
Start: 2018-08-10 | End: 2018-08-10

## 2018-08-10 RX ADMIN — Medication 10 ML: at 11:10

## 2018-08-10 RX ADMIN — IMMUNE GLOBULIN INTRAVENOUS (HUMAN) 20 G: 5 INJECTION, SOLUTION INTRAVENOUS at 08:39

## 2018-08-10 RX ADMIN — DIPHENHYDRAMINE HYDROCHLORIDE 25 MG: 25 CAPSULE ORAL at 08:29

## 2018-08-10 RX ADMIN — IMMUNE GLOBULIN INTRAVENOUS (HUMAN) 10 G: 5 INJECTION, SOLUTION INTRAVENOUS at 10:29

## 2018-08-10 RX ADMIN — ACETAMINOPHEN 650 MG: 325 TABLET ORAL at 08:28

## 2018-08-10 NOTE — PROGRESS NOTES
[facility] OPIC Progress Note    Date: August 10, 2018    Name: Gus Santacruz    MRN: [id number]         : 1950    IVIG    Ms. Titi Gee arrived to [facility] at 0800. Ms Titi Gee denies complaints. Ms. Titi Gee was assessed and education was provided. Ms. Barrera Smith vitals were reviewed. Visit Vitals    /76 (BP 1 Location: Left arm, BP Patient Position: Sitting)    Pulse 72    Temp 98.2 °F (36.8 °C)    Resp 18    Wt 53.9 kg (118 lb 14.4 oz)    SpO2 100%    Breastfeeding No    BMI 20.41 kg/m2       22g IV inserted in patient's Right AC. [place] Plaster Positive for blood return/ flushes without difficulty. Tylenol 650 mg and benadryl 25 mg administered. 30g IVIG titrated and administered as ordered administered as ordered followed by NS flush. Ms. Titi Gee tolerated well without complaints. IV removed/ intact. Gauze/ coban to site. Ms. Tiit Gee was discharged from [place] [address] in stable condition at 1115. She is to return on 18 at 0800 for her next appointment.     Regan Hernandez RN  August 10, 2018

## 2018-09-07 ENCOUNTER — APPOINTMENT (OUTPATIENT)
Dept: INFUSION THERAPY | Age: 68
End: 2018-09-07
Payer: MEDICARE

## 2018-09-12 ENCOUNTER — HOSPITAL ENCOUNTER (OUTPATIENT)
Dept: INFUSION THERAPY | Age: 68
Discharge: HOME | End: 2018-09-12
Payer: MEDICARE

## 2018-09-12 VITALS
DIASTOLIC BLOOD PRESSURE: 77 MMHG | RESPIRATION RATE: 18 BRPM | OXYGEN SATURATION: 100 % | HEART RATE: 67 BPM | TEMPERATURE: 97.4 F | SYSTOLIC BLOOD PRESSURE: 132 MMHG

## 2018-09-12 PROCEDURE — 96365 THER/PROPH/DIAG IV INF INIT: CPT

## 2018-09-12 PROCEDURE — 74011250637 HC RX REV CODE- 250/637: Performed by: ALLERGY & IMMUNOLOGY

## 2018-09-12 PROCEDURE — 96366 THER/PROPH/DIAG IV INF ADDON: CPT

## 2018-09-12 PROCEDURE — 74011250636 HC RX REV CODE- 250/636: Performed by: ALLERGY & IMMUNOLOGY

## 2018-09-12 RX ORDER — DIPHENHYDRAMINE HCL 25 MG
25 CAPSULE ORAL ONCE
Status: COMPLETED | OUTPATIENT
Start: 2018-09-12 | End: 2018-09-12

## 2018-09-12 RX ORDER — ACETAMINOPHEN 325 MG/1
650 TABLET ORAL
Status: ACTIVE | OUTPATIENT
Start: 2018-09-12 | End: 2018-09-12

## 2018-09-12 RX ORDER — DIPHENHYDRAMINE HYDROCHLORIDE 50 MG/ML
50 INJECTION, SOLUTION INTRAMUSCULAR; INTRAVENOUS
Status: ACTIVE | OUTPATIENT
Start: 2018-09-12 | End: 2018-09-12

## 2018-09-12 RX ORDER — ACETAMINOPHEN 325 MG/1
650 TABLET ORAL ONCE
Status: COMPLETED | OUTPATIENT
Start: 2018-09-12 | End: 2018-09-12

## 2018-09-12 RX ORDER — SODIUM CHLORIDE 0.9 % (FLUSH) 0.9 %
10-40 SYRINGE (ML) INJECTION AS NEEDED
Status: DISCONTINUED | OUTPATIENT
Start: 2018-09-12 | End: 2018-09-16 | Stop reason: HOSPADM

## 2018-09-12 RX ADMIN — DIPHENHYDRAMINE HYDROCHLORIDE 25 MG: 25 CAPSULE ORAL at 08:18

## 2018-09-12 RX ADMIN — Medication 10 ML: at 11:31

## 2018-09-12 RX ADMIN — IMMUNE GLOBULIN INTRAVENOUS (HUMAN) 10 G: 5 INJECTION, SOLUTION INTRAVENOUS at 08:46

## 2018-09-12 RX ADMIN — Medication 10 ML: at 08:47

## 2018-09-12 RX ADMIN — IMMUNE GLOBULIN INTRAVENOUS (HUMAN) 20 G: 5 INJECTION, SOLUTION INTRAVENOUS at 09:56

## 2018-09-12 RX ADMIN — ACETAMINOPHEN 650 MG: 325 TABLET ORAL at 08:18

## 2018-09-12 NOTE — PROGRESS NOTES
[facility] OPIC Progress Note Date: 2018 Name: Kayden Coy 
 
MRN: [id number] : 1950 IVIG infusion every 4 weeks Ms. Erica Acuna was assessed and education was provided. Ms. Олег Goldberg vitals were reviewed and patient was observed for 5 minutes prior to treatment. Visit Vitals  /69 (BP 1 Location: Right arm, BP Patient Position: Sitting; At rest)  Pulse 70  Temp 98.3 °F (36.8 °C)  Resp 18  SpO2 100%  Breastfeeding No  
 
No results found for this or any previous visit (from the past 12 hour(s)). IV started with 22 gauge INT in LAC w/o difficulty. Good blood return obtained, followed with 10 ml NS flush. Pre-medications of oral Tylenol 650 mg, oral Benadryl 25 mg were administered as ordered and infusion was initiated. IVIG 10 % 30 grams/300 ml was infused at 43 ml/hr x 30 minutes, 86 ml/hr x 30 minutes, 130 ml/hr x 30 minutes, 173 ml/hr x 30 minutes, 216 ml/hr until infusion completed. No s/s reaction noted. IV flushed with 20 ml NS and removed. No irritation or drainage noted at site, 2x2 gauze and Coban applied. Ms. Erica Acuna tolerated the infusion, and had no complaints. Patient armband removed and shredded. Ms. Erica Acuna was discharged from [place] [address] in stable condition at 1130. She is to return on 10/10/2018 at 0800 for her next appointment for IVIG infusion. Lee Raphael RN 2018 
1130

## 2018-09-12 NOTE — PROGRESS NOTES
conducted an initial consultation and Spiritual Assessment for Olga Evans, who is a 70 y.o.,female. Patients Primary Language is: [place]. According to the patients EMR Anabaptism Affiliation is: Bahai. The reason the Patient came to the hospital is: There are no active problems to display for this patient. The  provided the following Interventions: 
Initiated a relationship of care and support. Explored issues of cathy, belief, spirituality and Yarsanism/ritual needs while hospitalized. Listened empathically. Provided chaplaincy education. Provided information about Spiritual Care Services. Offered prayer and assurance of continued prayers on patient's behalf. Chart reviewed. The following outcomes where achieved: 
Patient shared limited information about both her medical narrative and spiritual journey/beliefs. Patient shared her reasons for turning away from going to a particular Muslim due to politics.  confirmed Patient's [address] in the past. 
Patient processed feeling about current hospitalization. Patient expressed gratitude for 's visit. Assessment: 
Patient does not have any Yarsanism/cultural needs that will affect patients preferences in health care. There are no spiritual or Yarsanism issues which require intervention at this time. Plan: 
Chaplains will continue to follow and will provide pastoral care on an as needed/requested basis.  recommends bedside caregivers page  on duty if patient shows signs of acute spiritual or emotional distress. Chaplain Resident Flor Liu Spiritual Care  
[phone]

## 2018-10-10 ENCOUNTER — HOSPITAL ENCOUNTER (OUTPATIENT)
Dept: INFUSION THERAPY | Age: 68
Discharge: HOME | End: 2018-10-10
Payer: MEDICARE

## 2018-10-10 VITALS
DIASTOLIC BLOOD PRESSURE: 71 MMHG | RESPIRATION RATE: 18 BRPM | OXYGEN SATURATION: 95 % | TEMPERATURE: 98 F | SYSTOLIC BLOOD PRESSURE: 121 MMHG | HEART RATE: 71 BPM

## 2018-10-10 PROCEDURE — 96366 THER/PROPH/DIAG IV INF ADDON: CPT

## 2018-10-10 PROCEDURE — 96365 THER/PROPH/DIAG IV INF INIT: CPT

## 2018-10-10 PROCEDURE — 74011250637 HC RX REV CODE- 250/637: Performed by: ALLERGY & IMMUNOLOGY

## 2018-10-10 PROCEDURE — 74011250636 HC RX REV CODE- 250/636: Performed by: ALLERGY & IMMUNOLOGY

## 2018-10-10 RX ORDER — SODIUM CHLORIDE 0.9 % (FLUSH) 0.9 %
10-40 SYRINGE (ML) INJECTION AS NEEDED
Status: DISCONTINUED | OUTPATIENT
Start: 2018-10-10 | End: 2018-10-14 | Stop reason: HOSPADM

## 2018-10-10 RX ORDER — DIPHENHYDRAMINE HYDROCHLORIDE 50 MG/ML
50 INJECTION, SOLUTION INTRAMUSCULAR; INTRAVENOUS
Status: ACTIVE | OUTPATIENT
Start: 2018-10-10 | End: 2018-10-10

## 2018-10-10 RX ORDER — ACETAMINOPHEN 325 MG/1
650 TABLET ORAL
Status: ACTIVE | OUTPATIENT
Start: 2018-10-10 | End: 2018-10-10

## 2018-10-10 RX ORDER — DIPHENHYDRAMINE HCL 25 MG
25 CAPSULE ORAL ONCE
Status: COMPLETED | OUTPATIENT
Start: 2018-10-10 | End: 2018-10-10

## 2018-10-10 RX ORDER — ACETAMINOPHEN 325 MG/1
650 TABLET ORAL ONCE
Status: COMPLETED | OUTPATIENT
Start: 2018-10-10 | End: 2018-10-10

## 2018-10-10 RX ADMIN — Medication 10 ML: at 11:55

## 2018-10-10 RX ADMIN — DIPHENHYDRAMINE HYDROCHLORIDE 25 MG: 25 CAPSULE ORAL at 08:32

## 2018-10-10 RX ADMIN — Medication 10 ML: at 08:33

## 2018-10-10 RX ADMIN — ACETAMINOPHEN 650 MG: 325 TABLET ORAL at 08:32

## 2018-10-10 RX ADMIN — IMMUNE GLOBULIN INTRAVENOUS (HUMAN) 10 G: 5 INJECTION, SOLUTION INTRAVENOUS at 08:49

## 2018-10-10 RX ADMIN — IMMUNE GLOBULIN INTRAVENOUS (HUMAN) 20 G: 5 INJECTION, SOLUTION INTRAVENOUS at 10:12

## 2018-10-10 NOTE — PROGRESS NOTES
[facility] OPIC Progress Note Date: October 10, 2018 Name: Ciara Blanco 
 
MRN: [id number] : 1950 IVIG infusion every 4 weeks Ms. Lexus Dixon was assessed and education was provided. Ms. Godfrey Mon vitals were reviewed and patient was observed for 5 minutes prior to treatment. Visit Vitals  /80 (BP 1 Location: Left arm, BP Patient Position: Sitting)  Pulse 79  Temp 98.3 °F (36.8 °C)  Resp 18  SpO2 98%  Breastfeeding No  
 
No results found for this or any previous visit (from the past 12 hour(s)). IV started with 22 gauge in RAC w/o difficulty. Good blood return obtained, followed with 10 ml NS flush. Pre-medications of oral Tylenol 650 mg, oral Benadryl 25 mg were administered as ordered and infusion was initiated. IVIG 10 % 30 grams/300 ml was infused at 43 ml/hr x 30 minutes, 86 ml/hr x 30 minutes, 130 ml/hr x 30 minutes, 173 ml/hr x 30 minutes, 216 ml/hr until infusion completed. No s/s reaction noted. IV flushed with 20 ml NS and removed. No irritation or drainage noted at site, 2x2 gauze and Coban applied. Ms. Lexus Dixon tolerated the infusion, and had no complaints. Patient armband removed and shredded. Ms. Lexus Dixon was discharged from [place] [address] in stable condition at 1200. She is to return on 2018 at 0800 for her next appointment for IVIG infusion. Ashlee Brown RN October 10, 2018 
1200

## 2018-11-05 ENCOUNTER — APPOINTMENT (OUTPATIENT)
Dept: INFUSION THERAPY | Age: 68
End: 2018-11-05

## 2018-11-09 ENCOUNTER — HOSPITAL ENCOUNTER (OUTPATIENT)
Dept: INFUSION THERAPY | Age: 68
Discharge: HOME | End: 2018-11-09
Payer: MEDICARE

## 2018-11-09 VITALS
HEART RATE: 69 BPM | OXYGEN SATURATION: 100 % | SYSTOLIC BLOOD PRESSURE: 132 MMHG | DIASTOLIC BLOOD PRESSURE: 76 MMHG | TEMPERATURE: 97.7 F | RESPIRATION RATE: 18 BRPM

## 2018-11-09 PROCEDURE — 96366 THER/PROPH/DIAG IV INF ADDON: CPT

## 2018-11-09 PROCEDURE — 74011250636 HC RX REV CODE- 250/636: Performed by: ALLERGY & IMMUNOLOGY

## 2018-11-09 PROCEDURE — 74011250637 HC RX REV CODE- 250/637: Performed by: ALLERGY & IMMUNOLOGY

## 2018-11-09 PROCEDURE — 82787 IGG 1 2 3 OR 4 EACH: CPT

## 2018-11-09 PROCEDURE — 96365 THER/PROPH/DIAG IV INF INIT: CPT

## 2018-11-09 RX ORDER — DIPHENHYDRAMINE HYDROCHLORIDE 50 MG/ML
50 INJECTION, SOLUTION INTRAMUSCULAR; INTRAVENOUS
Status: ACTIVE | OUTPATIENT
Start: 2018-11-09 | End: 2018-11-09

## 2018-11-09 RX ORDER — DIPHENHYDRAMINE HCL 25 MG
25 CAPSULE ORAL ONCE
Status: COMPLETED | OUTPATIENT
Start: 2018-11-09 | End: 2018-11-09

## 2018-11-09 RX ORDER — SODIUM CHLORIDE 0.9 % (FLUSH) 0.9 %
10-40 SYRINGE (ML) INJECTION AS NEEDED
Status: DISCONTINUED | OUTPATIENT
Start: 2018-11-09 | End: 2018-11-13 | Stop reason: HOSPADM

## 2018-11-09 RX ORDER — ACETAMINOPHEN 325 MG/1
650 TABLET ORAL
Status: ACTIVE | OUTPATIENT
Start: 2018-11-09 | End: 2018-11-09

## 2018-11-09 RX ORDER — ACETAMINOPHEN 325 MG/1
650 TABLET ORAL ONCE
Status: COMPLETED | OUTPATIENT
Start: 2018-11-09 | End: 2018-11-09

## 2018-11-09 RX ADMIN — Medication 10 ML: at 08:30

## 2018-11-09 RX ADMIN — IMMUNE GLOBULIN INTRAVENOUS (HUMAN) 20 G: 5 INJECTION, SOLUTION INTRAVENOUS at 10:02

## 2018-11-09 RX ADMIN — Medication 10 ML: at 11:05

## 2018-11-09 RX ADMIN — DIPHENHYDRAMINE HYDROCHLORIDE 25 MG: 25 CAPSULE ORAL at 08:30

## 2018-11-09 RX ADMIN — HUMAN IMMUNOGLOBULIN G 10 G: 10 LIQUID INTRAVENOUS at 08:51

## 2018-11-09 RX ADMIN — ACETAMINOPHEN 650 MG: 325 TABLET ORAL at 08:29

## 2018-11-09 NOTE — PROGRESS NOTES
[facility] OPIC Progress Note Date: 2018 Name: Amari Khan 
 
MRN: [id number] : 1950 IVIG infusion every 4 weeks Ms. Gladys Vauhgn was assessed and education was provided. Ms. Lucy Singleton vitals were reviewed and patient was observed for 5 minutes prior to treatment. Visit Vitals /76 (BP 1 Location: Left arm, BP Patient Position: Sitting) Pulse 69 Temp 97.7 °F (36.5 °C) Resp 18 SpO2 100% IV started with 22 gauge in RAC w/o difficulty. Good blood return obtained, followed with 10 ml NS flush. Pre-medications of oral Tylenol 650 mg, oral Benadryl 25 mg were administered as ordered and infusion was initiated. IVIG 10 % 30 grams/300 ml was infused at 43 ml/hr x 30 minutes, 86 ml/hr x 30 minutes, 130 ml/hr x 30 minutes, 173 ml/hr x 30 minutes, 216 ml/hr until infusion completed. No s/s reaction noted. IV flushed with 20 ml NS and removed. No irritation or drainage noted at site, 2x2 gauze and Coban applied. Ms. Gladys Vaughn tolerated the infusion, and had no complaints. Patient armband removed and shredded. Ms. Gladys Vaughn was discharged from [place] [address] in stable condition at 1110. She is to return on 2018 at 0800 for her next appointment for IVIG infusion. Ana Chaparro RN 2018

## 2018-11-11 LAB
IGG SERPL-MCNC: 811 MG/DL (ref 700–1600)
IGG1 SER-MCNC: 429 MG/DL (ref 248–810)
IGG2 SER-MCNC: 330 MG/DL (ref 130–555)
IGG3 SER-MCNC: 39 MG/DL (ref 15–102)
IGG4 SER-MCNC: 19 MG/DL (ref 2–96)

## 2018-12-07 ENCOUNTER — APPOINTMENT (OUTPATIENT)
Dept: INFUSION THERAPY | Age: 68
End: 2018-12-07
Payer: MEDICARE

## 2018-12-14 ENCOUNTER — HOSPITAL ENCOUNTER (OUTPATIENT)
Dept: INFUSION THERAPY | Age: 68
Discharge: HOME | End: 2018-12-14
Payer: MEDICARE

## 2018-12-14 VITALS
HEART RATE: 79 BPM | RESPIRATION RATE: 18 BRPM | DIASTOLIC BLOOD PRESSURE: 79 MMHG | OXYGEN SATURATION: 100 % | SYSTOLIC BLOOD PRESSURE: 136 MMHG | TEMPERATURE: 98 F

## 2018-12-14 PROCEDURE — 96366 THER/PROPH/DIAG IV INF ADDON: CPT

## 2018-12-14 PROCEDURE — 74011250636 HC RX REV CODE- 250/636: Performed by: ALLERGY & IMMUNOLOGY

## 2018-12-14 PROCEDURE — 96365 THER/PROPH/DIAG IV INF INIT: CPT

## 2018-12-14 PROCEDURE — 74011250637 HC RX REV CODE- 250/637: Performed by: ALLERGY & IMMUNOLOGY

## 2018-12-14 RX ORDER — DIPHENHYDRAMINE HCL 25 MG
25 CAPSULE ORAL ONCE
Status: COMPLETED | OUTPATIENT
Start: 2018-12-14 | End: 2018-12-14

## 2018-12-14 RX ORDER — DIPHENHYDRAMINE HYDROCHLORIDE 50 MG/ML
50 INJECTION, SOLUTION INTRAMUSCULAR; INTRAVENOUS
Status: ACTIVE | OUTPATIENT
Start: 2018-12-14 | End: 2018-12-14

## 2018-12-14 RX ORDER — SODIUM CHLORIDE 9 MG/ML
25 INJECTION, SOLUTION INTRAVENOUS CONTINUOUS
Status: DISPENSED | OUTPATIENT
Start: 2018-12-14 | End: 2018-12-15

## 2018-12-14 RX ORDER — SODIUM CHLORIDE 0.9 % (FLUSH) 0.9 %
10-40 SYRINGE (ML) INJECTION AS NEEDED
Status: DISCONTINUED | OUTPATIENT
Start: 2018-12-14 | End: 2018-12-18 | Stop reason: HOSPADM

## 2018-12-14 RX ORDER — ACETAMINOPHEN 325 MG/1
650 TABLET ORAL
Status: ACTIVE | OUTPATIENT
Start: 2018-12-14 | End: 2018-12-14

## 2018-12-14 RX ORDER — ACETAMINOPHEN 325 MG/1
650 TABLET ORAL ONCE
Status: COMPLETED | OUTPATIENT
Start: 2018-12-14 | End: 2018-12-14

## 2018-12-14 RX ADMIN — IMMUNE GLOBULIN INTRAVENOUS (HUMAN) 20 G: 5 INJECTION, SOLUTION INTRAVENOUS at 09:57

## 2018-12-14 RX ADMIN — IMMUNE GLOBULIN INTRAVENOUS (HUMAN) 10 G: 5 INJECTION, SOLUTION INTRAVENOUS at 08:45

## 2018-12-14 RX ADMIN — ACETAMINOPHEN 650 MG: 325 TABLET ORAL at 08:21

## 2018-12-14 RX ADMIN — Medication 10 ML: at 11:07

## 2018-12-14 RX ADMIN — DIPHENHYDRAMINE HYDROCHLORIDE 25 MG: 25 CAPSULE ORAL at 08:21

## 2018-12-14 NOTE — PROGRESS NOTES
[facility] OPIC Progress Note    Date: 2018    Name: Yashira Kelly    MRN: [id number]         : 1950     IVIG infusion every 4 weeks      Ms. Shanika Smith was assessed and education was provided. Ms. Juice Eid vitals were reviewed and patient was observed for 5 minutes prior to treatment. Visit Vitals  /79 (BP 1 Location: Left arm, BP Patient Position: Sitting)   Pulse 79   Temp 98 °F (36.7 °C)   Resp 18   SpO2 100%   Breastfeeding? No       IV started with 22 gauge in LAC w/o difficulty. Good blood return obtained, followed with 10 ml NS flush. Pre-medications of oral Tylenol 650 mg, oral Benadryl 25 mg were administered as ordered and infusion was initiated. IVIG 10 % 30 grams/300 ml was infused at 43 ml/hr x 30 minutes, 86 ml/hr x 30 minutes, 130 ml/hr x 30 minutes, 173 ml/hr x 30 minutes, 216 ml/hr until infusion completed. No s/s reaction noted. Patient Vitals for the past 12 hrs:   Temp Pulse Resp BP SpO2   18 1032 98 °F (36.7 °C) 79 18 136/79 100 %   18 0945 98.4 °F (36.9 °C) 79 18 133/79 100 %   18 0914 97.9 °F (36.6 °C) 72 18 116/74 99 %   18 0809 98.4 °F (36.9 °C) 86 18 147/77 99 %       IV flushed with 20 ml NS and removed. No irritation or drainage noted at site, 2x2 gauze and Coban applied. Ms. Shanika Smith tolerated the infusion, and had no complaints. Patient armband removed and shredded. Ms. Shanika Smith was discharged from [place] [address] in stable condition at 1110. She is to return on 2018 at 0800 for her next appointment for IVIG infusion.     Magdaleno Ornelas RN  2018

## 2019-01-11 ENCOUNTER — APPOINTMENT (OUTPATIENT)
Dept: INFUSION THERAPY | Age: 69
End: 2019-01-11
Payer: MEDICARE

## 2019-01-22 ENCOUNTER — HOSPITAL ENCOUNTER (OUTPATIENT)
Dept: INFUSION THERAPY | Age: 69
Discharge: HOME | End: 2019-01-22
Payer: MEDICARE

## 2019-01-22 VITALS
SYSTOLIC BLOOD PRESSURE: 127 MMHG | HEART RATE: 75 BPM | TEMPERATURE: 98.1 F | DIASTOLIC BLOOD PRESSURE: 77 MMHG | HEIGHT: 64 IN | WEIGHT: 130 LBS | BODY MASS INDEX: 22.2 KG/M2 | OXYGEN SATURATION: 97 % | RESPIRATION RATE: 18 BRPM

## 2019-01-22 PROCEDURE — 74011250636 HC RX REV CODE- 250/636: Performed by: ALLERGY & IMMUNOLOGY

## 2019-01-22 PROCEDURE — 74011250637 HC RX REV CODE- 250/637: Performed by: ALLERGY & IMMUNOLOGY

## 2019-01-22 PROCEDURE — 96365 THER/PROPH/DIAG IV INF INIT: CPT

## 2019-01-22 PROCEDURE — 96366 THER/PROPH/DIAG IV INF ADDON: CPT

## 2019-01-22 PROCEDURE — 82784 ASSAY IGA/IGD/IGG/IGM EACH: CPT

## 2019-01-22 RX ORDER — SODIUM CHLORIDE 0.9 % (FLUSH) 0.9 %
10-40 SYRINGE (ML) INJECTION AS NEEDED
Status: DISCONTINUED | OUTPATIENT
Start: 2019-01-22 | End: 2019-01-26 | Stop reason: HOSPADM

## 2019-01-22 RX ORDER — ACETAMINOPHEN 325 MG/1
650 TABLET ORAL
Status: ACTIVE | OUTPATIENT
Start: 2019-01-22 | End: 2019-01-22

## 2019-01-22 RX ORDER — ACETAMINOPHEN 325 MG/1
650 TABLET ORAL ONCE
Status: COMPLETED | OUTPATIENT
Start: 2019-01-22 | End: 2019-01-22

## 2019-01-22 RX ORDER — DIPHENHYDRAMINE HYDROCHLORIDE 50 MG/ML
50 INJECTION, SOLUTION INTRAMUSCULAR; INTRAVENOUS
Status: ACTIVE | OUTPATIENT
Start: 2019-01-22 | End: 2019-01-22

## 2019-01-22 RX ORDER — DIPHENHYDRAMINE HCL 25 MG
25 CAPSULE ORAL ONCE
Status: ACTIVE | OUTPATIENT
Start: 2019-01-22 | End: 2019-01-22

## 2019-01-22 RX ADMIN — IMMUNE GLOBULIN INTRAVENOUS (HUMAN) 20 G: 5 INJECTION, SOLUTION INTRAVENOUS at 08:45

## 2019-01-22 RX ADMIN — IMMUNE GLOBULIN INTRAVENOUS (HUMAN) 10 G: 5 INJECTION, SOLUTION INTRAVENOUS at 10:32

## 2019-01-22 RX ADMIN — ACETAMINOPHEN 650 MG: 325 TABLET ORAL at 08:30

## 2019-01-22 NOTE — PROGRESS NOTES
[facility] OPIC Progress Note Date: 2019 Name: Michelle Wagoner 
 
MRN: [id number] : 1950 IVIG infusion every 4 weeks Ms. Rebecca Heaton was assessed and education was provided. Ms. Filipe Monroy vitals were reviewed and patient was observed for 5 minutes prior to treatment. Visit Vitals /79 (BP 1 Location: Left arm, BP Patient Position: Sitting) Pulse 75 Temp 98 °F (36.7 °C) Resp 18 Ht 5' 4\" (1.626 m) Wt 59 kg (130 lb) SpO2 98% Breastfeeding? No  
BMI 22.31 kg/m² IV started with 24 gauge in RAC w/o difficulty. Good blood return obtained. IGG trough level drawn, followed with 10 ml NS flush. Pre-medications of oral Tylenol 650 mg was administered as ordered and infusion was initiated. Patient declined Benadryl this visit. IVIG 10 % 30 grams/300 ml was infused at 43 ml/hr x 30 minutes, 86 ml/hr x 30 minutes, 130 ml/hr x 30 minutes, 173 ml/hr x 30 minutes, 216 ml/hr until infusion completed. No s/s reaction noted. Patient Vitals for the past 12 hrs: 
 Temp Pulse Resp BP SpO2  
19 0915 98 °F (36.7 °C) 75 18 126/79 98 % 19 0810 97.5 °F (36.4 °C) 61 18 157/89 99 % IV flushed with 20 ml NS and removed. No irritation or drainage noted at site, 2x2 gauze and Coban applied. Ms. Rebecca Heaton tolerated the infusion, and had no complaints. Patient armband removed and shredded. Ms. Rebecca Heaton was discharged from [place] [address] in stable condition at 1120. She is to return on 2019 at 0800 for her next appointment for IVIG infusion. Edwin Riggs RN 
2019

## 2019-01-23 LAB — IGG SERPL-MCNC: 785 MG/DL (ref 700–1600)

## 2019-01-30 ENCOUNTER — HOSPITAL ENCOUNTER (OUTPATIENT)
Dept: MAMMOGRAPHY | Age: 69
Discharge: HOME | End: 2019-01-30
Attending: INTERNAL MEDICINE
Payer: MEDICARE

## 2019-01-30 DIAGNOSIS — Z12.31 VISIT FOR SCREENING MAMMOGRAM: ICD-10-CM

## 2019-01-30 PROCEDURE — 77063 BREAST TOMOSYNTHESIS BI: CPT

## 2019-02-19 ENCOUNTER — HOSPITAL ENCOUNTER (OUTPATIENT)
Dept: INFUSION THERAPY | Age: 69
Discharge: HOME | End: 2019-02-19
Payer: MEDICARE

## 2019-02-19 VITALS
DIASTOLIC BLOOD PRESSURE: 67 MMHG | OXYGEN SATURATION: 99 % | RESPIRATION RATE: 18 BRPM | HEART RATE: 79 BPM | SYSTOLIC BLOOD PRESSURE: 120 MMHG | TEMPERATURE: 98.2 F

## 2019-02-19 PROCEDURE — 74011250636 HC RX REV CODE- 250/636: Performed by: ALLERGY & IMMUNOLOGY

## 2019-02-19 PROCEDURE — 74011250637 HC RX REV CODE- 250/637: Performed by: ALLERGY & IMMUNOLOGY

## 2019-02-19 PROCEDURE — 96366 THER/PROPH/DIAG IV INF ADDON: CPT

## 2019-02-19 PROCEDURE — 96365 THER/PROPH/DIAG IV INF INIT: CPT

## 2019-02-19 RX ORDER — DIPHENHYDRAMINE HCL 25 MG
25 CAPSULE ORAL ONCE
Status: ACTIVE | OUTPATIENT
Start: 2019-02-19 | End: 2019-02-19

## 2019-02-19 RX ORDER — ACETAMINOPHEN 325 MG/1
650 TABLET ORAL ONCE
Status: COMPLETED | OUTPATIENT
Start: 2019-02-19 | End: 2019-02-19

## 2019-02-19 RX ORDER — DIPHENHYDRAMINE HYDROCHLORIDE 50 MG/ML
50 INJECTION, SOLUTION INTRAMUSCULAR; INTRAVENOUS
Status: ACTIVE | OUTPATIENT
Start: 2019-02-19 | End: 2019-02-19

## 2019-02-19 RX ORDER — SODIUM CHLORIDE 0.9 % (FLUSH) 0.9 %
10-40 SYRINGE (ML) INJECTION EVERY 8 HOURS
Status: DISCONTINUED | OUTPATIENT
Start: 2019-02-19 | End: 2019-02-23 | Stop reason: HOSPADM

## 2019-02-19 RX ORDER — ACETAMINOPHEN 325 MG/1
650 TABLET ORAL
Status: ACTIVE | OUTPATIENT
Start: 2019-02-19 | End: 2019-02-19

## 2019-02-19 RX ADMIN — IMMUNE GLOBULIN INTRAVENOUS (HUMAN) 20 G: 5 INJECTION, SOLUTION INTRAVENOUS at 08:40

## 2019-02-19 RX ADMIN — Medication 10 ML: at 11:15

## 2019-02-19 RX ADMIN — ACETAMINOPHEN 650 MG: 325 TABLET ORAL at 08:18

## 2019-02-19 RX ADMIN — IMMUNE GLOBULIN INTRAVENOUS (HUMAN) 10 G: 5 INJECTION, SOLUTION INTRAVENOUS at 10:30

## 2019-02-19 NOTE — PROGRESS NOTES
[facility] Progress Note Date: 2019 Name: Hannah Stone 
 
MRN: [id number] : 1950 IVIG infusion every 4 weeks Ms. Kavita Amos was assessed and education was provided. Ms. Tavares Center vitals were reviewed and patient was observed for 5 minutes prior to treatment. Visit Vitals /67 (BP 1 Location: Left arm, BP Patient Position: Sitting) Pulse 79 Temp 98.2 °F (36.8 °C) Resp 18 SpO2 99% Breastfeeding? No  
 
 
IV started with 24 gauge in RAC w/o difficulty. Brisk blood return obtained. Pre-medications of oral Tylenol 650 mg was administered as ordered and infusion was initiated. Patient declined Benadryl this visit. IVIG 10 % 30 grams/300 ml was infused at 43 ml/hr x 30 minutes, 86 ml/hr x 30 minutes, 130 ml/hr x 30 minutes, 173 ml/hr x 30 minutes, 216 ml/hr until infusion completed. No s/s reaction noted. Patient Vitals for the past 12 hrs: 
 Temp Pulse Resp BP SpO2  
19 1115 98.2 °F (36.8 °C) 79 18 120/67 99 % 19 1040 98.1 °F (36.7 °C) 76 18 115/69 99 % 19 1010 98 °F (36.7 °C) 79 18 111/66 99 % 19 0940 98.1 °F (36.7 °C) 79 18 109/60 100 % 19 0910 98.2 °F (36.8 °C) 81 18 112/63 99 % 19 0819 98.5 °F (36.9 °C) 79 18 128/75 100 % IV flushed with 20 ml NS and removed. No irritation or drainage noted at site, 2x2 gauze and Coban applied. Ms. Kavita Amos tolerated the infusion, and had no complaints. Patient armband removed and shredded. Ms. Kavita Amos was discharged from [place] [address] in stable condition at 1130. She is to return on 2019 at 0800 for her next appointment for IVIG infusion. Emma Tang RN 2019

## 2019-02-22 ENCOUNTER — NURSE NAVIGATOR (OUTPATIENT)
Dept: MAMMOGRAPHY | Age: 69
End: 2019-02-22

## 2019-02-22 ENCOUNTER — HOSPITAL ENCOUNTER (OUTPATIENT)
Dept: MAMMOGRAPHY | Age: 69
Discharge: HOME | End: 2019-02-22
Attending: INTERNAL MEDICINE
Payer: MEDICARE

## 2019-02-22 ENCOUNTER — HOSPITAL ENCOUNTER (OUTPATIENT)
Dept: ULTRASOUND IMAGING | Age: 69
Discharge: HOME | End: 2019-02-22
Attending: INTERNAL MEDICINE
Payer: MEDICARE

## 2019-02-22 DIAGNOSIS — R92.8 ABNORMAL MAMMOGRAM: ICD-10-CM

## 2019-02-22 DIAGNOSIS — N63.0 BREAST MASS: ICD-10-CM

## 2019-02-22 PROCEDURE — 76642 ULTRASOUND BREAST LIMITED: CPT

## 2019-02-22 PROCEDURE — 77061 BREAST TOMOSYNTHESIS UNI: CPT

## 2019-02-22 NOTE — PROGRESS NOTES
Met with MsNava Alexey Yeboah regarding recommendation for Left Breast Ultrasound Biopsy. Explained procedure and answered all questions. She has been scheduled for biopsy at the [facility] on Wednesday 2/27/19 @ 1pm    Her follow up appointment with the Radiologist and Nurse Navigator to receive biopsy results is scheduled on: Monday 3/4/19      ULTRASOUND GUIDED BREAST BIOPSY   PRE-PROCEDURE PATIENT INSTRUCTIONS      · Please arrive 15 minutes before scheduled procedure. · NO fasting required. · Patient should take all regular medication with exception of BLOOD THINNERS. The Radiologist or Patient Navigator will inform the patient of specific instructions related to prescribed blood thinners. · Patient should bring a good support bra to wear after the biopsy to reduce breast motion and be more comfortable. · Only the breast will be numbed for procedure. Nothing that will make the patient drowsy or light headed so they may drive home afterwards. · There may be some bruising to breast - thats normal. The body will reabsorb the bruise in time. · Ice should be applied to the biopsy site intermittently through out the day. · Patient may take Tylenol after procedure for pain relief. · Avoid heavy lifting and strenuous exercise for 24 hours. · Leave steri-strip on biopsy site - may shower over site and pat dry.

## 2019-02-27 ENCOUNTER — HOSPITAL ENCOUNTER (OUTPATIENT)
Dept: MAMMOGRAPHY | Age: 69
Discharge: HOME | End: 2019-02-27
Attending: INTERNAL MEDICINE
Payer: MEDICARE

## 2019-02-27 ENCOUNTER — HOSPITAL ENCOUNTER (OUTPATIENT)
Dept: ULTRASOUND IMAGING | Age: 69
Discharge: HOME | End: 2019-02-27
Attending: INTERNAL MEDICINE
Payer: MEDICARE

## 2019-02-27 DIAGNOSIS — R92.8 ABNORMAL MAMMOGRAM: ICD-10-CM

## 2019-02-27 DIAGNOSIS — N63.0 LUMP OR MASS IN BREAST: ICD-10-CM

## 2019-02-27 PROCEDURE — 74011000250 HC RX REV CODE- 250

## 2019-02-27 PROCEDURE — 77065 DX MAMMO INCL CAD UNI: CPT

## 2019-02-27 PROCEDURE — 88305 TISSUE EXAM BY PATHOLOGIST: CPT

## 2019-02-27 PROCEDURE — 19083 BX BREAST 1ST LESION US IMAG: CPT

## 2019-02-27 PROCEDURE — 74011250636 HC RX REV CODE- 250/636

## 2019-02-27 RX ORDER — LIDOCAINE HYDROCHLORIDE AND EPINEPHRINE 10; 10 MG/ML; UG/ML
20 INJECTION, SOLUTION INFILTRATION; PERINEURAL
Status: COMPLETED | OUTPATIENT
Start: 2019-02-27 | End: 2019-02-27

## 2019-02-27 RX ORDER — LIDOCAINE HYDROCHLORIDE 10 MG/ML
10 INJECTION, SOLUTION EPIDURAL; INFILTRATION; INTRACAUDAL; PERINEURAL
Status: COMPLETED | OUTPATIENT
Start: 2019-02-27 | End: 2019-02-27

## 2019-02-27 RX ADMIN — LIDOCAINE HYDROCHLORIDE 10 ML: 10 INJECTION, SOLUTION EPIDURAL; INFILTRATION; INTRACAUDAL; PERINEURAL at 14:19

## 2019-02-27 RX ADMIN — LIDOCAINE HYDROCHLORIDE AND EPINEPHRINE 20 MG: 10; 10 INJECTION, SOLUTION INFILTRATION; PERINEURAL at 14:18

## 2019-02-28 ENCOUNTER — HOSPITAL ENCOUNTER (OUTPATIENT)
Dept: LAB | Age: 69
Discharge: HOME | End: 2019-02-28
Payer: MEDICARE

## 2019-02-28 DIAGNOSIS — Z01.818 PREOP EXAMINATION: ICD-10-CM

## 2019-02-28 DIAGNOSIS — E11.9 DIABETES MELLITUS WITHOUT COMPLICATION (HCC): ICD-10-CM

## 2019-02-28 LAB
ANION GAP SERPL CALC-SCNC: 6 MMOL/L (ref 3–18)
ATRIAL RATE: 75 BPM
BUN SERPL-MCNC: 17 MG/DL (ref 7–18)
BUN/CREAT SERPL: 20 (ref 12–20)
CALCIUM SERPL-MCNC: 9 MG/DL (ref 8.5–10.1)
CALCULATED P AXIS, ECG09: 39 DEGREES
CALCULATED R AXIS, ECG10: 5 DEGREES
CALCULATED T AXIS, ECG11: 14 DEGREES
CHLORIDE SERPL-SCNC: 102 MMOL/L (ref 100–108)
CO2 SERPL-SCNC: 30 MMOL/L (ref 21–32)
CREAT SERPL-MCNC: 0.85 MG/DL (ref 0.6–1.3)
DIAGNOSIS, 93000: NORMAL
GLUCOSE SERPL-MCNC: 101 MG/DL (ref 74–99)
P-R INTERVAL, ECG05: 154 MS
POTASSIUM SERPL-SCNC: 4.1 MMOL/L (ref 3.5–5.5)
Q-T INTERVAL, ECG07: 386 MS
QRS DURATION, ECG06: 98 MS
QTC CALCULATION (BEZET), ECG08: 431 MS
SODIUM SERPL-SCNC: 138 MMOL/L (ref 136–145)
VENTRICULAR RATE, ECG03: 75 BPM

## 2019-02-28 PROCEDURE — 80048 BASIC METABOLIC PNL TOTAL CA: CPT

## 2019-02-28 PROCEDURE — 93005 ELECTROCARDIOGRAM TRACING: CPT

## 2019-02-28 PROCEDURE — 36415 COLL VENOUS BLD VENIPUNCTURE: CPT

## 2019-03-01 ENCOUNTER — TELEPHONE (OUTPATIENT)
Dept: MAMMOGRAPHY | Age: 69
End: 2019-03-01

## 2019-03-01 NOTE — TELEPHONE ENCOUNTER
Called Ms. Kaufman Hand to inform her of benign breast biopsy results. Explained pathology findings and recommendation for follow up routine screening mammogram in 12 months. She expressed understanding and her results follow up [facility] has been cancelled.

## 2019-03-12 ENCOUNTER — HOSPITAL ENCOUNTER (OUTPATIENT)
Dept: LAB | Age: 69
Discharge: HOME | End: 2019-03-12
Payer: MEDICARE

## 2019-03-12 PROCEDURE — 88331 PATH CONSLTJ SURG 1 BLK 1SPC: CPT

## 2019-03-12 PROCEDURE — 88305 TISSUE EXAM BY PATHOLOGIST: CPT

## 2019-03-21 ENCOUNTER — HOSPITAL ENCOUNTER (OUTPATIENT)
Dept: INFUSION THERAPY | Age: 69
Discharge: HOME | End: 2019-03-21
Payer: MEDICARE

## 2019-03-21 VITALS
TEMPERATURE: 98.2 F | OXYGEN SATURATION: 97 % | HEART RATE: 75 BPM | SYSTOLIC BLOOD PRESSURE: 122 MMHG | DIASTOLIC BLOOD PRESSURE: 75 MMHG | RESPIRATION RATE: 18 BRPM

## 2019-03-21 PROCEDURE — 96366 THER/PROPH/DIAG IV INF ADDON: CPT

## 2019-03-21 PROCEDURE — 74011250636 HC RX REV CODE- 250/636: Performed by: ALLERGY & IMMUNOLOGY

## 2019-03-21 PROCEDURE — 74011250636 HC RX REV CODE- 250/636

## 2019-03-21 PROCEDURE — 96365 THER/PROPH/DIAG IV INF INIT: CPT

## 2019-03-21 PROCEDURE — 74011250637 HC RX REV CODE- 250/637

## 2019-03-21 PROCEDURE — 82784 ASSAY IGA/IGD/IGG/IGM EACH: CPT

## 2019-03-21 RX ORDER — DIPHENHYDRAMINE HYDROCHLORIDE 50 MG/ML
50 INJECTION, SOLUTION INTRAMUSCULAR; INTRAVENOUS
Status: ACTIVE | OUTPATIENT
Start: 2019-03-21 | End: 2019-03-21

## 2019-03-21 RX ORDER — SODIUM CHLORIDE 0.9 % (FLUSH) 0.9 %
10-40 SYRINGE (ML) INJECTION AS NEEDED
Status: DISCONTINUED | OUTPATIENT
Start: 2019-03-21 | End: 2019-03-24 | Stop reason: HOSPADM

## 2019-03-21 RX ORDER — DIPHENHYDRAMINE HCL 25 MG
25 CAPSULE ORAL ONCE
Status: ACTIVE | OUTPATIENT
Start: 2019-03-21 | End: 2019-03-21

## 2019-03-21 RX ORDER — ACETAMINOPHEN 325 MG/1
650 TABLET ORAL
Status: ACTIVE | OUTPATIENT
Start: 2019-03-21 | End: 2019-03-21

## 2019-03-21 RX ORDER — HEPARIN 100 UNIT/ML
500 SYRINGE INTRAVENOUS ONCE
Status: ACTIVE | OUTPATIENT
Start: 2019-03-21 | End: 2019-03-21

## 2019-03-21 RX ORDER — ACETAMINOPHEN 325 MG/1
650 TABLET ORAL ONCE
Status: COMPLETED | OUTPATIENT
Start: 2019-03-21 | End: 2019-03-21

## 2019-03-21 RX ADMIN — IMMUNE GLOBULIN INTRAVENOUS (HUMAN) 5 G: 5 INJECTION, SOLUTION INTRAVENOUS at 09:54

## 2019-03-21 RX ADMIN — ACETAMINOPHEN 650 MG: 325 TABLET ORAL at 09:31

## 2019-03-21 RX ADMIN — IMMUNE GLOBULIN INTRAVENOUS (HUMAN) 5 G: 5 INJECTION, SOLUTION INTRAVENOUS at 12:31

## 2019-03-21 RX ADMIN — IMMUNE GLOBULIN INTRAVENOUS (HUMAN) 20 G: 5 INJECTION, SOLUTION INTRAVENOUS at 10:49

## 2019-03-21 NOTE — PROGRESS NOTES
[facility] Progress Note Date: 2019 Name: Diya Vieira 
 
MRN: [id number] : 1950 IVIG infusion every 4 weeks Ms. Catarina Dominguez was assessed and education was provided. Ms. José Manuel Styles vitals were reviewed and patient was observed for 5 minutes prior to treatment. Visit Vitals /75 (BP 1 Location: Left arm, BP Patient Position: Sitting) Pulse 75 Temp 98.2 °F (36.8 °C) Resp 18 SpO2 97% Breastfeeding? No  
 
 
IV started with 24 gauge in RAC w/o difficulty. Brisk blood return obtained. Pre-medications of oral Tylenol 650 mg was administered as ordered and infusion was initiated. Patient declined Benadryl this visit. IVIG 10 % 30 grams/300 ml was infused at 33 ml/hr x 30 minutes, 66 ml/hr x 30 minutes, 120 ml/hr x 30 minutes, 220 ml/hr x 30 minutes until infusion completed. No s/s reaction noted. Patient Vitals for the past 12 hrs: 
 Temp Pulse Resp BP SpO2  
19 1250 98.2 °F (36.8 °C) 75 18 122/75 97 % 19 1205 98.1 °F (36.7 °C) 81 18 137/79 100 % 19 1123 97.5 °F (36.4 °C) 81 18 123/75 99 % 19 1028 98.2 °F (36.8 °C) 76 18 119/71 99 % 19 0910 98.1 °F (36.7 °C) 82 18 134/52 99 % IV flushed with 20 ml NS and removed. No irritation or drainage noted at site, 2x2 gauze and Coban applied. Ms. Catarina Dominguez tolerated the infusion, and had no complaints. Patient armband removed and shredded. Ms. Catarina Dominguez was discharged from [place] [address] in stable condition at [phone]. She is to return on 2019 at 0800 for her next appointment for IVIG infusion. Pebbles Fine RN 
2019

## 2019-03-22 LAB — IGG SERPL-MCNC: 833 MG/DL (ref 700–1600)

## 2019-04-11 RX ORDER — ACETAMINOPHEN 325 MG/1
650 TABLET ORAL
Status: CANCELLED | OUTPATIENT
Start: 2019-04-18 | End: 2019-04-18

## 2019-04-11 RX ORDER — DIPHENHYDRAMINE HCL 25 MG
25 CAPSULE ORAL ONCE
Status: CANCELLED | OUTPATIENT
Start: 2019-04-18 | End: 2019-04-18

## 2019-04-11 RX ORDER — DIPHENHYDRAMINE HYDROCHLORIDE 50 MG/ML
50 INJECTION, SOLUTION INTRAMUSCULAR; INTRAVENOUS
Status: CANCELLED | OUTPATIENT
Start: 2019-04-18 | End: 2019-04-18

## 2019-04-18 ENCOUNTER — HOSPITAL ENCOUNTER (OUTPATIENT)
Dept: INFUSION THERAPY | Age: 69
Discharge: HOME | End: 2019-04-18
Payer: MEDICARE

## 2019-04-18 VITALS
TEMPERATURE: 97.9 F | HEART RATE: 79 BPM | DIASTOLIC BLOOD PRESSURE: 77 MMHG | SYSTOLIC BLOOD PRESSURE: 123 MMHG | RESPIRATION RATE: 18 BRPM | OXYGEN SATURATION: 100 %

## 2019-04-18 PROCEDURE — 96366 THER/PROPH/DIAG IV INF ADDON: CPT

## 2019-04-18 PROCEDURE — 96365 THER/PROPH/DIAG IV INF INIT: CPT

## 2019-04-18 PROCEDURE — 74011250636 HC RX REV CODE- 250/636: Performed by: ALLERGY & IMMUNOLOGY

## 2019-04-18 RX ORDER — SODIUM CHLORIDE 0.9 % (FLUSH) 0.9 %
10-40 SYRINGE (ML) INJECTION AS NEEDED
Status: DISCONTINUED | OUTPATIENT
Start: 2019-04-18 | End: 2019-04-21 | Stop reason: HOSPADM

## 2019-04-18 RX ORDER — ACETAMINOPHEN 325 MG/1
650 TABLET ORAL ONCE
Status: ACTIVE | OUTPATIENT
Start: 2019-04-18 | End: 2019-04-18

## 2019-04-18 RX ADMIN — IMMUNE GLOBULIN INTRAVENOUS (HUMAN) 5 G: 5 INJECTION, SOLUTION INTRAVENOUS at 10:25

## 2019-04-18 RX ADMIN — IMMUNE GLOBULIN INTRAVENOUS (HUMAN) 20 G: 5 INJECTION, SOLUTION INTRAVENOUS at 08:28

## 2019-04-18 RX ADMIN — IMMUNE GLOBULIN INTRAVENOUS (HUMAN) 5 G: 5 INJECTION, SOLUTION INTRAVENOUS at 10:41

## 2019-04-18 NOTE — PROGRESS NOTES
[facility] Progress Note Date: 2019 Name: Abdi Kim 
 
MRN: [id number] : 1950 IVIG infusion every 4 weeks Ms. Boubacar Evans was assessed and education was provided. Ms. Stephany Worrell vitals were reviewed and patient was observed for 5 minutes prior to treatment. Visit Vitals /77 (BP 1 Location: Right arm, BP Patient Position: Sitting) Pulse 79 Temp 97.9 °F (36.6 °C) Resp 18 SpO2 100% Breastfeeding? No  
 
 
IV started with 2 gauge in L AC w/o difficulty. Brisk blood return obtained. Pt declined IGG level because one was drawn last month. Pt took her own Tylenol 1000 mg, and declined Benadryl. IVIG 10 % 30 grams/300 ml was infused at 33 ml/hr x 30 minutes, 66 ml/hr x 30 minutes, 120 ml/hr x 30 minutes, 220 ml/hr x 30 minutes until infusion completed. No s/s reaction noted. Patient Vitals for the past 12 hrs: 
 Temp Pulse Resp BP SpO2  
19 1042 97.9 °F (36.6 °C) 79 18 123/77 100 % 19 1000 97.8 °F (36.6 °C) 77 18 121/79 99 % 19 0930 98.5 °F (36.9 °C) 83 18 119/76 99 % 19 0900 98.2 °F (36.8 °C) 80 18 117/63 99 % 19 0811 98 °F (36.7 °C) 86 18 127/83 98 % IV flushed with 20 ml NS and removed. No irritation or drainage noted at site, 2x2 gauze and Coban applied. Ms. Boubacar Evans tolerated the infusion, and had no complaints. Patient armband removed and shredded. Ms. Boubacar Evans was discharged from [place] [address] in stable condition at 12. She is to return on 2019 at 0800 for her next appointment for IVIG infusion. Brennen Crisostomo RN 2019

## 2019-05-16 ENCOUNTER — HOSPITAL ENCOUNTER (OUTPATIENT)
Dept: INFUSION THERAPY | Age: 69
Discharge: HOME | End: 2019-05-16
Payer: MEDICARE

## 2019-05-16 VITALS
TEMPERATURE: 97.8 F | DIASTOLIC BLOOD PRESSURE: 79 MMHG | OXYGEN SATURATION: 97 % | SYSTOLIC BLOOD PRESSURE: 133 MMHG | HEART RATE: 71 BPM | RESPIRATION RATE: 18 BRPM

## 2019-05-16 PROCEDURE — 96366 THER/PROPH/DIAG IV INF ADDON: CPT

## 2019-05-16 PROCEDURE — 96365 THER/PROPH/DIAG IV INF INIT: CPT

## 2019-05-16 PROCEDURE — 74011250636 HC RX REV CODE- 250/636: Performed by: ALLERGY & IMMUNOLOGY

## 2019-05-16 PROCEDURE — 74011250637 HC RX REV CODE- 250/637: Performed by: ALLERGY & IMMUNOLOGY

## 2019-05-16 RX ORDER — DIPHENHYDRAMINE HCL 25 MG
25 CAPSULE ORAL ONCE
Status: ACTIVE | OUTPATIENT
Start: 2019-05-16 | End: 2019-05-16

## 2019-05-16 RX ORDER — SODIUM CHLORIDE 0.9 % (FLUSH) 0.9 %
10-40 SYRINGE (ML) INJECTION AS NEEDED
Status: DISCONTINUED | OUTPATIENT
Start: 2019-05-16 | End: 2019-05-20 | Stop reason: HOSPADM

## 2019-05-16 RX ORDER — DIPHENHYDRAMINE HYDROCHLORIDE 50 MG/ML
50 INJECTION, SOLUTION INTRAMUSCULAR; INTRAVENOUS
Status: ACTIVE | OUTPATIENT
Start: 2019-05-16 | End: 2019-05-16

## 2019-05-16 RX ORDER — ACETAMINOPHEN 325 MG/1
650 TABLET ORAL
Status: ACTIVE | OUTPATIENT
Start: 2019-05-16 | End: 2019-05-16

## 2019-05-16 RX ORDER — ACETAMINOPHEN 325 MG/1
650 TABLET ORAL ONCE
Status: COMPLETED | OUTPATIENT
Start: 2019-05-16 | End: 2019-05-16

## 2019-05-16 RX ADMIN — Medication 10 ML: at 11:31

## 2019-05-16 RX ADMIN — IMMUNE GLOBULIN INTRAVENOUS (HUMAN) 20 G: 5 INJECTION, SOLUTION INTRAVENOUS at 10:25

## 2019-05-16 RX ADMIN — IMMUNE GLOBULIN INTRAVENOUS (HUMAN) 5 G: 5 INJECTION, SOLUTION INTRAVENOUS at 09:38

## 2019-05-16 RX ADMIN — IMMUNE GLOBULIN INTRAVENOUS (HUMAN) 5 G: 5 INJECTION, SOLUTION INTRAVENOUS at 08:51

## 2019-05-16 RX ADMIN — Medication 10 ML: at 08:20

## 2019-05-16 RX ADMIN — ACETAMINOPHEN 650 MG: 325 TABLET ORAL at 08:27

## 2019-05-16 NOTE — PROGRESS NOTES
[facility] Progress Note Date: May 16, 2019 Name: Nolan Espino 
 
MRN: [id number] : 1950 IVIG infusion every 4 weeks Ms. Latonya Jacques was assessed and education was provided. Ms. Yuli Warren vitals were reviewed and patient was observed for 5 minutes prior to treatment. Visit Vitals /79 (BP 1 Location: Left arm, BP Patient Position: Sitting) Pulse 71 Temp 97.8 °F (36.6 °C) Resp 18 SpO2 97% Breastfeeding? No  
 
 
IV started with 22 gauge in RAC w/o difficulty. Brisk blood return obtained. Pre-medications of oral Tylenol 650 mg was administered as ordered and infusion was initiated. Patient declined Benadryl this visit. IVIG 10 % 30 grams/300 ml was infused at 33 ml/hr x 30 minutes, 66 ml/hr x 30 minutes, 120 ml/hr x 30 minutes, 220 ml/hr x 30 minutes until infusion completed. No s/s reaction noted. Patient Vitals for the past 12 hrs: 
 Temp Pulse Resp BP SpO2  
19 1129 97.8 °F (36.6 °C) 71 18 133/79 97 % 19 1040  74 18 128/79 100 % 19 1011 98 °F (36.7 °C) 77 18 123/73 100 % 19 0927 98.7 °F (37.1 °C) 83 18 128/77 99 % 19 0813 98 °F (36.7 °C) 88 18 133/75 97 % IV flushed with 20 ml NS and removed. No irritation or drainage noted at site, 2x2 gauze and Coban applied. Ms. Latonya Jacques tolerated the infusion, and had no complaints. Patient armband removed and shredded. Ms. Latonya Jacques was discharged from [place] [address] in stable condition at 1140. She is to return on 2019 at 0800 for her next appointment for IVIG infusion. Shannan Fisher RN May 16, 2019

## 2019-06-13 ENCOUNTER — HOSPITAL ENCOUNTER (OUTPATIENT)
Dept: INFUSION THERAPY | Age: 69
Discharge: HOME | End: 2019-06-13
Payer: MEDICARE

## 2019-06-13 VITALS
BODY MASS INDEX: 23.05 KG/M2 | HEART RATE: 80 BPM | OXYGEN SATURATION: 98 % | WEIGHT: 135 LBS | SYSTOLIC BLOOD PRESSURE: 128 MMHG | HEIGHT: 64 IN | DIASTOLIC BLOOD PRESSURE: 71 MMHG | RESPIRATION RATE: 18 BRPM | TEMPERATURE: 98.2 F

## 2019-06-13 PROCEDURE — 96366 THER/PROPH/DIAG IV INF ADDON: CPT

## 2019-06-13 PROCEDURE — 74011250636 HC RX REV CODE- 250/636: Performed by: ALLERGY & IMMUNOLOGY

## 2019-06-13 PROCEDURE — 96365 THER/PROPH/DIAG IV INF INIT: CPT

## 2019-06-13 PROCEDURE — 74011250637 HC RX REV CODE- 250/637: Performed by: ALLERGY & IMMUNOLOGY

## 2019-06-13 RX ORDER — ACETAMINOPHEN 325 MG/1
650 TABLET ORAL ONCE
Status: COMPLETED | OUTPATIENT
Start: 2019-06-13 | End: 2019-06-13

## 2019-06-13 RX ORDER — SODIUM CHLORIDE 0.9 % (FLUSH) 0.9 %
10-40 SYRINGE (ML) INJECTION AS NEEDED
Status: DISCONTINUED | OUTPATIENT
Start: 2019-06-13 | End: 2019-06-17 | Stop reason: HOSPADM

## 2019-06-13 RX ORDER — DIPHENHYDRAMINE HYDROCHLORIDE 50 MG/ML
50 INJECTION, SOLUTION INTRAMUSCULAR; INTRAVENOUS
Status: ACTIVE | OUTPATIENT
Start: 2019-06-13 | End: 2019-06-13

## 2019-06-13 RX ORDER — ACETAMINOPHEN 325 MG/1
650 TABLET ORAL
Status: ACTIVE | OUTPATIENT
Start: 2019-06-13 | End: 2019-06-13

## 2019-06-13 RX ORDER — DIPHENHYDRAMINE HCL 25 MG
25 CAPSULE ORAL ONCE
Status: ACTIVE | OUTPATIENT
Start: 2019-06-13 | End: 2019-06-13

## 2019-06-13 RX ADMIN — ACETAMINOPHEN 650 MG: 325 TABLET ORAL at 08:10

## 2019-06-13 RX ADMIN — Medication 10 ML: at 11:05

## 2019-06-13 RX ADMIN — IMMUNE GLOBULIN INTRAVENOUS (HUMAN) 10 G: 5 INJECTION, SOLUTION INTRAVENOUS at 10:28

## 2019-06-13 RX ADMIN — IMMUNE GLOBULIN INTRAVENOUS (HUMAN) 20 G: 5 INJECTION, SOLUTION INTRAVENOUS at 08:40

## 2019-06-13 NOTE — PROGRESS NOTES
[facility] Progress Note    Date: 2019    Name: Jade Castellon    MRN: [id number]         : 1950     IVIG infusion every 4 weeks    Ms. Brian Garay was assessed and education was provided. Ms. Lorenzo Nieves vitals were reviewed and patient was observed for 5 minutes prior to treatment. Visit Vitals  /71 (BP 1 Location: Right arm, BP Patient Position: Sitting)   Pulse 80   Temp 98.2 °F (36.8 °C)   Resp 18   Ht 5' 4\" (1.626 m)   Wt 61.2 kg (135 lb)   SpO2 98%   Breastfeeding? No   BMI 23.17 kg/m²       IV started with 22 gauge in RAC w/o difficulty. Brisk blood return obtained. Pre-medications of oral Tylenol 650 mg was administered as ordered and infusion was initiated. Patient declined Benadryl this visit. IVIG 10 % 30 grams/300 ml was infused at 33 ml/hr x 30 minutes, 66 ml/hr x 30 minutes, 120 ml/hr x 30 minutes, 220 ml/hr x 30 minutes until infusion completed. No s/s reaction noted. Patient Vitals for the past 12 hrs:   Temp Pulse Resp BP SpO2   19 1105 98.2 °F (36.8 °C) 80 18 128/71 98 %   19 1010 98.2 °F (36.8 °C) 80 18 122/70 97 %   19 0940 98.3 °F (36.8 °C) 72 18 127/71 97 %   19 0910 98.3 °F (36.8 °C) 82 18 131/72 99 %   19 0814 97.5 °F (36.4 °C) 79 18 147/83 100 %       IV flushed with 20 ml NS and removed. No irritation or drainage noted at site, 2x2 gauze and Coban applied. Ms. Brian Garay tolerated the infusion, and had no complaints. Patient armband removed and shredded. Ms. Brian Garay was discharged from [place] [address] in stable condition at 1110. She is to return on 2019 at 0800 for her next appointment for IVIG infusion.     Nick Wolf RN  2019

## 2019-07-11 ENCOUNTER — HOSPITAL ENCOUNTER (OUTPATIENT)
Dept: INFUSION THERAPY | Age: 69
Discharge: HOME | End: 2019-07-11
Payer: MEDICARE

## 2019-07-11 VITALS
HEIGHT: 64 IN | SYSTOLIC BLOOD PRESSURE: 125 MMHG | HEART RATE: 83 BPM | RESPIRATION RATE: 20 BRPM | BODY MASS INDEX: 23.75 KG/M2 | TEMPERATURE: 97.7 F | OXYGEN SATURATION: 99 % | WEIGHT: 139.13 LBS | DIASTOLIC BLOOD PRESSURE: 68 MMHG

## 2019-07-11 PROCEDURE — 74011250637 HC RX REV CODE- 250/637: Performed by: ALLERGY & IMMUNOLOGY

## 2019-07-11 PROCEDURE — 96366 THER/PROPH/DIAG IV INF ADDON: CPT

## 2019-07-11 PROCEDURE — 96365 THER/PROPH/DIAG IV INF INIT: CPT

## 2019-07-11 PROCEDURE — 74011250636 HC RX REV CODE- 250/636: Performed by: ALLERGY & IMMUNOLOGY

## 2019-07-11 RX ORDER — ACETAMINOPHEN 325 MG/1
650 TABLET ORAL ONCE
Status: COMPLETED | OUTPATIENT
Start: 2019-07-11 | End: 2019-07-11

## 2019-07-11 RX ORDER — DIPHENHYDRAMINE HCL 25 MG
25 CAPSULE ORAL ONCE
Status: ACTIVE | OUTPATIENT
Start: 2019-07-11 | End: 2019-07-11

## 2019-07-11 RX ORDER — ACETAMINOPHEN 325 MG/1
650 TABLET ORAL
Status: ACTIVE | OUTPATIENT
Start: 2019-07-11 | End: 2019-07-11

## 2019-07-11 RX ORDER — SODIUM CHLORIDE 0.9 % (FLUSH) 0.9 %
10-40 SYRINGE (ML) INJECTION AS NEEDED
Status: DISCONTINUED | OUTPATIENT
Start: 2019-07-11 | End: 2019-07-15 | Stop reason: HOSPADM

## 2019-07-11 RX ORDER — DIPHENHYDRAMINE HYDROCHLORIDE 50 MG/ML
50 INJECTION, SOLUTION INTRAMUSCULAR; INTRAVENOUS
Status: ACTIVE | OUTPATIENT
Start: 2019-07-11 | End: 2019-07-11

## 2019-07-11 RX ADMIN — ACETAMINOPHEN 650 MG: 325 TABLET ORAL at 08:36

## 2019-07-11 RX ADMIN — IMMUNE GLOBULIN INTRAVENOUS (HUMAN) 20 G: 5 INJECTION, SOLUTION INTRAVENOUS at 08:43

## 2019-07-11 RX ADMIN — IMMUNE GLOBULIN INTRAVENOUS (HUMAN) 10 G: 5 INJECTION, SOLUTION INTRAVENOUS at 10:45

## 2019-07-11 NOTE — PROGRESS NOTES
[facility] Progress Note    Date: 2019    Name: Joyce Clark    MRN: [id number]         : 1950     IVIG infusion every 4 weeks    Ms. Bishnu Gibson was assessed and education was provided. Ms. Sophia Ludwig vitals were reviewed and patient was observed for 5 minutes prior to treatment. Visit Vitals  /68 (BP 1 Location: Right arm, BP Patient Position: Sitting)   Pulse 83   Temp 97.7 °F (36.5 °C)   Resp 20   Ht 5' 4\" (1.626 m)   Wt 63.1 kg (139 lb 2 oz)   SpO2 99%   BMI 23.88 kg/m²       IV started with 24 gauge in R  LAC w/o difficulty. Brisk blood return obtained. Pre-medications of oral Tylenol 650 mg was administered as ordered and infusion was initiated. Patient declined Benadryl this visit. IVIG 10 % 30 grams/300 ml was infused at 33 ml/hr x 30 minutes, 66 ml/hr x 30 minutes, 120 ml/hr x 30 minutes, 220 ml/hr x 30 minutes until infusion completed. No s/s reaction noted. Patient Vitals for the past 12 hrs:   Temp Pulse Resp BP SpO2   19 1115 97.7 °F (36.5 °C) 83 20  99 %   19 1031 97.7 °F (36.5 °C) 77 20 125/68 98 %   19 0956 97.7 °F (36.5 °C) 83 20 124/73 100 %   19 0916 98.5 °F (36.9 °C) 82 20 123/73 100 %   19 0813 98 °F (36.7 °C) 84 20 131/78 99 %       IV flushed with 20 ml NS and removed. No irritation or drainage noted at site, 2x2 gauze and Coban applied. Ms. Bishnu Gibson tolerated the infusion, and had no complaints. Patient armband removed and shredded. Ms. Bishnu Gibson was discharged from [place] [address] in stable condition at 1120. She is to return on 2019 at 0800 for her next appointment for IVIG infusion.     Teddy Ronquillo RN  2019

## 2019-08-08 ENCOUNTER — HOSPITAL ENCOUNTER (OUTPATIENT)
Dept: INFUSION THERAPY | Age: 69
Discharge: HOME | End: 2019-08-08
Payer: MEDICARE

## 2019-08-08 VITALS
OXYGEN SATURATION: 99 % | HEART RATE: 77 BPM | RESPIRATION RATE: 18 BRPM | SYSTOLIC BLOOD PRESSURE: 120 MMHG | DIASTOLIC BLOOD PRESSURE: 76 MMHG | TEMPERATURE: 98.2 F

## 2019-08-08 PROCEDURE — 74011250636 HC RX REV CODE- 250/636: Performed by: ALLERGY & IMMUNOLOGY

## 2019-08-08 PROCEDURE — 96365 THER/PROPH/DIAG IV INF INIT: CPT

## 2019-08-08 PROCEDURE — 96366 THER/PROPH/DIAG IV INF ADDON: CPT

## 2019-08-08 PROCEDURE — 74011250637 HC RX REV CODE- 250/637: Performed by: ALLERGY & IMMUNOLOGY

## 2019-08-08 RX ORDER — SODIUM CHLORIDE 0.9 % (FLUSH) 0.9 %
10-40 SYRINGE (ML) INJECTION AS NEEDED
Status: DISCONTINUED | OUTPATIENT
Start: 2019-08-08 | End: 2019-08-12 | Stop reason: HOSPADM

## 2019-08-08 RX ORDER — ACETAMINOPHEN 325 MG/1
650 TABLET ORAL ONCE
Status: COMPLETED | OUTPATIENT
Start: 2019-08-08 | End: 2019-08-08

## 2019-08-08 RX ORDER — ACETAMINOPHEN 325 MG/1
650 TABLET ORAL
Status: ACTIVE | OUTPATIENT
Start: 2019-08-08 | End: 2019-08-08

## 2019-08-08 RX ORDER — DIPHENHYDRAMINE HYDROCHLORIDE 50 MG/ML
50 INJECTION, SOLUTION INTRAMUSCULAR; INTRAVENOUS
Status: ACTIVE | OUTPATIENT
Start: 2019-08-08 | End: 2019-08-08

## 2019-08-08 RX ORDER — DIPHENHYDRAMINE HCL 25 MG
25 CAPSULE ORAL ONCE
Status: ACTIVE | OUTPATIENT
Start: 2019-08-08 | End: 2019-08-08

## 2019-08-08 RX ADMIN — ACETAMINOPHEN 650 MG: 325 TABLET ORAL at 08:25

## 2019-08-08 RX ADMIN — IMMUNE GLOBULIN INTRAVENOUS (HUMAN) 10 G: 5 INJECTION, SOLUTION INTRAVENOUS at 08:49

## 2019-08-08 RX ADMIN — IMMUNE GLOBULIN INTRAVENOUS (HUMAN) 20 G: 5 INJECTION, SOLUTION INTRAVENOUS at 10:04

## 2019-08-08 NOTE — PROGRESS NOTES
[facility] Progress Note    Date: 2019    Name: Julianna Almaraz    MRN: [id number]         : 1950     IVIG infusion every 4 weeks      Ms. Lory Diaz was assessed and education was provided. Ms. Tamar Aceves vitals were reviewed and patient was observed for 5 minutes prior to treatment. Visit Vitals  /76 (BP 1 Location: Left arm, BP Patient Position: Sitting)   Pulse 77   Temp 98.2 °F (36.8 °C)   Resp 18   SpO2 99%   Breastfeeding? No       IV started with 24 gauge in LAC w/o difficulty. Brisk blood return obtained. Pre-medications of oral Tylenol 650 mg was administered as ordered and infusion was initiated. Patient declined Benadryl this visit. IVIG 10 % 30 grams/300 ml was infused at 33 ml/hr x 30 minutes, 66 ml/hr x 30 minutes, 120 ml/hr x 30 minutes, 220 ml/hr x 30 minutes until infusion completed. No s/s reaction noted. Patient Vitals for the past 12 hrs:   Temp Pulse Resp BP SpO2   19 1118 98.2 °F (36.8 °C) 77 18 120/76 99 %   19 1034 97.9 °F (36.6 °C) 70 18 127/75 99 %   19 0948 97.8 °F (36.6 °C) 73 18 133/78 100 %   19 0921 97.8 °F (36.6 °C) 74 18 134/78 99 %   19 0816 98.4 °F (36.9 °C) 79 18 164/81 100 %       IV flushed with 20 ml NS and removed. No irritation or drainage noted at site, 2x2 gauze and Coban applied. Ms. Lory Diaz tolerated the infusion, and had no complaints. Patient armband removed and shredded. Ms. Lory Diaz was discharged from [place] [address] in stable condition at 1120. She is to return on 2019 at 0800 for her next appointment for IVIG infusion.     Yousif Sosa RN  2019

## 2019-09-05 ENCOUNTER — HOSPITAL ENCOUNTER (OUTPATIENT)
Dept: INFUSION THERAPY | Age: 69
End: 2019-09-05
Payer: MEDICARE

## 2019-09-09 ENCOUNTER — HOSPITAL ENCOUNTER (OUTPATIENT)
Dept: INFUSION THERAPY | Age: 69
Discharge: HOME | End: 2019-09-09
Payer: MEDICARE

## 2019-09-09 VITALS
SYSTOLIC BLOOD PRESSURE: 125 MMHG | TEMPERATURE: 97.9 F | BODY MASS INDEX: 23.39 KG/M2 | OXYGEN SATURATION: 96 % | HEART RATE: 72 BPM | DIASTOLIC BLOOD PRESSURE: 79 MMHG | WEIGHT: 137 LBS | HEIGHT: 64 IN | RESPIRATION RATE: 20 BRPM

## 2019-09-09 PROCEDURE — 86704 HEP B CORE ANTIBODY TOTAL: CPT

## 2019-09-09 PROCEDURE — 96366 THER/PROPH/DIAG IV INF ADDON: CPT

## 2019-09-09 PROCEDURE — 74011250637 HC RX REV CODE- 250/637: Performed by: ALLERGY & IMMUNOLOGY

## 2019-09-09 PROCEDURE — 82784 ASSAY IGA/IGD/IGG/IGM EACH: CPT

## 2019-09-09 PROCEDURE — 74011250636 HC RX REV CODE- 250/636: Performed by: ALLERGY & IMMUNOLOGY

## 2019-09-09 PROCEDURE — 96365 THER/PROPH/DIAG IV INF INIT: CPT

## 2019-09-09 RX ORDER — ACETAMINOPHEN 325 MG/1
650 TABLET ORAL
Status: ACTIVE | OUTPATIENT
Start: 2019-09-09 | End: 2019-09-09

## 2019-09-09 RX ORDER — SODIUM CHLORIDE 0.9 % (FLUSH) 0.9 %
5-10 SYRINGE (ML) INJECTION EVERY 8 HOURS
Status: DISCONTINUED | OUTPATIENT
Start: 2019-09-09 | End: 2019-09-13 | Stop reason: HOSPADM

## 2019-09-09 RX ORDER — DIPHENHYDRAMINE HCL 25 MG
25 CAPSULE ORAL ONCE
Status: DISPENSED | OUTPATIENT
Start: 2019-09-09 | End: 2019-09-09

## 2019-09-09 RX ORDER — DIPHENHYDRAMINE HYDROCHLORIDE 50 MG/ML
50 INJECTION, SOLUTION INTRAMUSCULAR; INTRAVENOUS
Status: ACTIVE | OUTPATIENT
Start: 2019-09-09 | End: 2019-09-09

## 2019-09-09 RX ORDER — SODIUM CHLORIDE 9 MG/ML
50 INJECTION, SOLUTION INTRAVENOUS CONTINUOUS
Status: DISCONTINUED | OUTPATIENT
Start: 2019-09-09 | End: 2019-09-10 | Stop reason: HOSPADM

## 2019-09-09 RX ORDER — ACETAMINOPHEN 325 MG/1
650 TABLET ORAL ONCE
Status: COMPLETED | OUTPATIENT
Start: 2019-09-09 | End: 2019-09-09

## 2019-09-09 RX ADMIN — IMMUNE GLOBULIN INTRAVENOUS (HUMAN) 10 G: 5 INJECTION, SOLUTION INTRAVENOUS at 12:44

## 2019-09-09 RX ADMIN — Medication 20 ML: at 13:16

## 2019-09-09 RX ADMIN — Medication 10 ML: at 10:30

## 2019-09-09 RX ADMIN — IMMUNE GLOBULIN INTRAVENOUS (HUMAN) 20 G: 5 INJECTION, SOLUTION INTRAVENOUS at 10:42

## 2019-09-09 RX ADMIN — ACETAMINOPHEN 650 MG: 325 TABLET ORAL at 10:25

## 2019-09-09 NOTE — PROGRESS NOTES
[facility] Progress Note    Date: 2019    Name: Gus Santacruz    MRN: [id number]         : 1950     IVIG infusion every 4 weeks      Ms. Titi Gee was assessed and education was provided. Ms. Barrera Smith vitals were reviewed and patient was observed for 5 minutes prior to treatment. Visit Vitals  /79 (BP 1 Location: Left arm, BP Patient Position: At rest)   Pulse 72   Temp 97.9 °F (36.6 °C)   Resp 20   Ht 5' 4\" (1.626 m)   Wt 62.1 kg (137 lb)   SpO2 96%   BMI 23.52 kg/m²       IV started with 22 gauge in RAC w/o difficulty. Brisk blood return obtained. Labs drawn as ordered. No results found for this or any previous visit (from the past 12 hour(s)). Pre-medications of oral Tylenol 650 mg was administered as ordered and infusion was initiated. Patient declined Benadryl this visit. IVIG 10 % 30 grams/300 ml was infused at 33 ml/hr x 30 minutes, 66 ml/hr x 30 minutes, 132 ml/hr x 30 minutes, 264 ml/hr x 30 minutes until infusion completed. No s/s reaction noted. Patient Vitals for the past 12 hrs:   Temp Pulse Resp BP SpO2   19 1315 97.9 °F (36.6 °C) 72 20 125/79 96 %   19 1236 97.9 °F (36.6 °C) 74 20 121/72 97 %   19 1155 98.2 °F (36.8 °C) 82 20 122/75 99 %   19 1115 98.1 °F (36.7 °C) 77 20 125/69 98 %   19 1030 98 °F (36.7 °C) 76 20 139/78 100 %       IV flushed with 20 ml NS and removed. No irritation or drainage noted at site, 2x2 gauze and Coban applied. Ms. Titi Gee tolerated the infusion, and had no complaints. Patient armband removed and shredded. Ms. Titi Gee was discharged from [place] [address] in stable condition at 1320. She is to return on 10/07/2019 at 0800 for her next appointment for IVIG infusion.     Camille De La Garza RN  2019

## 2019-09-10 LAB — IGG SERPL-MCNC: 837 MG/DL (ref 700–1600)

## 2019-09-11 LAB
HBV CORE AB SERPL QL IA: POSITIVE
HBV CORE IGM SERPL QL IA: NEGATIVE

## 2019-09-26 ENCOUNTER — HOSPITAL ENCOUNTER (OUTPATIENT)
Dept: PHYSICAL THERAPY | Age: 69
Discharge: HOME | End: 2019-09-26
Payer: MEDICARE

## 2019-09-26 PROCEDURE — 97112 NEUROMUSCULAR REEDUCATION: CPT

## 2019-09-26 PROCEDURE — 97161 PT EVAL LOW COMPLEX 20 MIN: CPT

## 2019-09-26 NOTE — PROGRESS NOTES
[facility]  [address]  [place], [address]  [phone] fax    Plan of Care/ Statement of Necessity for Physical Therapy Services    Patient name: Joselin Blair Start of Care: 2019   Referral source: Talha Deleon MD : 1950    Medical Diagnosis: Dizziness and giddiness [R42]  Unsteadiness on feet [R26.81]  Payor: Hardeep Saba / Plan: [place] A & B / Product Type: Medicare /  Onset Date:years ago    Treatment Diagnosis: dizziness and imbalance   Prior Hospitalization: see medical history Provider#: [id number]   Medications: Verified on Patient summary List    Comorbidities: postural hypotension, craniotomy 2013 to remove small meningioma on \"left side on the right leg motor pathways\", diabetic neuropathy, colles fracture several years ago, left hand carpal fracture, fibromyalgia, depression, osteoporosis, arthritis, diabetes, SOB with exertion   Prior Level of Function: mild instability and falls occasionally for many years, no use of AD for assistance      The Plan of Care and following information is based on the information from the initial evaluation. Assessment/ key information: Pt reports several years of sensations of instability and occasional falls. She reports she feels \"like i'm swimming\" with quick changes in position that last < a minute. She is unsure if she gets any room spinning, but reports brief lightheadedness when changing position from lying to sitting that she attributes to postural hypotension. She reports she did have a meningioma that was found incidentally after a fall in  that was on the left side \"on the motor pathways for the right leg\" that was removed. She did note that she felt like she was tripping less after that procedure, but reports she continues to have issues with tripping and with sensations of dizziness or \"swimming sensations\".  She reports she feels clumsy, but has always been [place] bit of a klutz\". She reports she did see an ENT @ [facility] who advised her that he feels she may have a mild problem with her inner ear, more likely on the left after some testing. Pt reports brief lightheadedness with positional changes, possibly 2/2 postural hypotension, impaired static balance with tandem and SLS, borderline (+) dynamic visual acuity test, and diminished score of dynamic gait index. She will benefit from skilled PT to facilitate progression of vestibular and balance interventions. Evaluation Complexity History MEDIUM  Complexity : 1-2 comorbidities / personal factors will impact the outcome/ POC ; Examination MEDIUM Complexity : 3 Standardized tests and measures addressing body structure, function, activity limitation and / or participation in recreation  ;Presentation LOW Complexity : Stable, uncomplicated  ;Clinical Decision Making MEDIUM Complexity : FOTO score of 26-74  Overall Complexity Rating: LOW   Problem List: impaired gait/ balance and decrease activity tolerance   Treatment Plan may include any combination of the following: Therapeutic exercise, Therapeutic activities, Neuromuscular re-education, Physical agent/modality, Gait/balance training, Manual therapy, Patient education and Self Care training  Patient / Family readiness to learn indicated by: asking questions, trying to perform skills and interest  Persons(s) to be included in education: patient (P)  Barriers to Learning/Limitations: None  Patient Goal (s): Better Balance.   Patient Self Reported Health Status: good  Rehabilitation Potential: fair    Short Term Goals: To be accomplished in 2 weeks:   1. Patient will report performance of HEP at least 2 times per day to facilitate improved outcomes and improved self management. Long Term Goals: To be accomplished in 8 weeks:     1. Pt will demonstrate (-) dynamic visual acuity test to indicate improve vestibular function and reduce instability.    2. Pt will demonstrate 24/24 DGI score to indicate improved gait stability. 3. Pt will demonstrate ability to perform tandem balance for 30 seconds void of > 1 balance error to indicate improved static balance. Frequency / Duration: Patient to be seen 2 times per week for 6-8 weeks. Patient/ Caregiver education and instruction: Diagnosis, prognosis, self care, activity modification and exercises   [x]  Plan of care has been reviewed with PTA    Certification Period: 9/26/2019 - 11/24/2019  Jase Lopez PT, DPT, ATC 9/26/2019 7:15 PM    ________________________________________________________________________    I certify that the above Therapy Services are being furnished while the patient is under my care. I agree with the treatment plan and certify that this therapy is necessary.     [de-identified] Signature:____________________  Date:____________Time: _________    Please sign and return to [facility]  [address]  [place], [address]  [phone] fax

## 2019-09-26 NOTE — PROGRESS NOTES
PT DAILY TREATMENT NOTE/VESTIBULAR EVAL 10-18    Patient Name: Rayne Valverde  Date:2019  : 1950  [x]  Patient  Verified  Payor: [place] MEDICARE / Plan: VA MEDICARE PART A & B / Product Type: Medicare /    In time:10:02am  Out time:10:56am  Total Treatment Time (min): 47  Visit #: 1 of 12    Medicare/BCBS Only   Total Timed Codes (min):  21 1:1 Treatment Time:  54     Treatment Area: Dizziness and giddiness [R42]  Unsteadiness on feet [R26.81]    SUBJECTIVE  Pain Level (0-10 scale): 1-2/10  []constant [x]intermittent []improving [x]worsening []no change since onset    Any medication changes, allergies to medications, adverse drug reactions, diagnosis change, or new procedure performed?: [x] No    [] Yes (see summary sheet for update)  Subjective functional status/changes:     Pt reports several years of sensations of instability and occasional falls. She reports she feels \"like i'm swimming\" with quick changes in position that last < a minute. She is unsure if she gets any room spinning, but reports brief lightheadedness when changing position from lying to sitting that she attributes to postural hypotension. She reports she did have a meningioma that was found incidentally after a fall in  that was on the left side \"on the motor pathways for the right leg\" that was removed. She did note that she felt like she was tripping less after that procedure, but reports she continues to have issues with tripping and with sensations of dizziness or \"swimming sensations\". She reports she feels clumsy, but has always been \"a bit of a klutz\". She reports she did see an ENT @ [facility] who advised her that he feels she may have a mild problem with her inner ear, more likely on the left after some testing. Pt reports she took Tessonex last night which tends to make her \"loopy\" the next morning.     PLOF: mild instability and falls occasionally for many years, no use of AD for assistance  Limitations to PLOF: similar, possibly gradually worsening  Mechanism of Injury: chronic gradual onset  Current symptoms/Complaints: see above in subjective  Previous Treatment/Compliance: ENT referral, imaging  PMHx/Surgical Hx: postural hypotension, craniotomy 2013 to remove small meningioma on \"left side on the right leg motor pathways\", diabetic neuropathy, colles fracture several years ago, left hand carpal fracture,   Work Hx: see intake  Living Situation:   Pt Goals: see intake  Barriers: []pain []financial []time []transportation []other  Motivation: appears motivated and cooperative  Substance use: []Alcohol []Tobacco []other:   FABQ Score: []low []elevate  Cognition: A & O x 4    Other:    OBJECTIVE/EXAMINATION  Domestic Life:   Activity/Recreational Limitations:   Mobility:   Self Care:     33 min [x]Eval                  []Re-Eval     14 min Neuromuscular Re-education:  []  See flow sheet : HEP per handout   Rationale: improve coordination, improve balance and increase proprioception  to improve the patients ability to improve ease of daily activity and reduce dizziness/instability. Self Care: 7 minutes pt education regarding relevant anatomy, diagnosis, prognosis, plan of care, HEP progression to facilitate pt self management.         With   [] TE   [] TA   [] neuro   [] other: Patient Education: [x] Review HEP    [] Progressed/Changed HEP based on:   [] positioning   [] body mechanics   [] transfers   [] heat/ice application    [] other:      Other Objective/Functional Measures:    Physical Therapy Evaluation - Vestibular    Posture:  [x] WNL      [] Forward head    [] Protracted shoulders    [] Retracted shoulders  [] Kyphosis:  [] increased   [] decreased   [] Lordosis:   [] increased   [] decreased  Other:    C/S ROM: [x] WFL    [] Limited    Describe:    Strength: [x] WFL    [] Limited    Describe:    Optional Tests:  Sensation:  [] Intact [x] Diminished    Describe: reported diminished sensation in LEs in feet, not assessed formally 2/2 TC    Proprioception: [x] Intact [] Diminished    Describe:    Coordination Testing:       Disdiadochokinesia [] WFL    [] Impaired    Describe:       Heel - Clemens  [] WFL    [] Impaired    Describe:       FNF   [] WFL    [] Impaired    Describe: Toe Tap   [] WFL    [] Impaired    Describe:    Oculomotor Tests: (Fixation Not Blocked)       Ocular ROM:   [x] WFL    [] Limited    Describe:       Spontaneous Nystag. [x] Neg     [] Pos    [] Left    [] Right       Gaze Holding Nystag. [x] Neg     [] Pos    [] Left    [] Right        Smooth Pursuit  [x] Neg     [] Pos    [] Left    [] Right        Saccades   [x] Neg     [] Pos    [] Left    [] Right        VOR - Slow Head Mvmt [x] Neg     [] Pos    [] Left    [] Right        VOR - Fast Head Mvmt [x] Neg     [] Pos    [] Left    [] Right        Head Thrust  [x] Neg     [] Pos    [] Left    [] Right        Static Visual Acuity [x] Neg     [] Pos    [] Left    [] Right        Dynamic Visual Acuity [] Neg     [x] Pos    [] Left    [] Right minimally (+) close to loss of 2 lines on visual acuity with snellen eye chart    Oculomotor Tests: (Fixation Blocked): NT       Spontaneous Nystag. [] Neg     [] Pos    [] Left    [] Right       Gaze Holding Nystag. [] Neg     [] Pos    [] Left    [] Right        Head-Shaking Nystag. [] Neg     [] Pos    [] Left    [] Right    Other Special Tests:       Vertebral Artery Testing [] Neg     [] Pos    [] Left    [] Right       Hallpike-[place] Maneuver [x] Neg     [] Pos    [] Left    [] Right       Roll Test   [x] Neg     [] Pos    [] Left    [] Right       [place] Balance Scale [] Neg     [] Pos    Score:       Dynamic Gait Index [] Neg     [x] Pos    Score: 22/24       Functional Gait Assess.  [] Neg     [] Pos    Score:    Balance Standard Testing (Eyes Open/Eyes Closed - EO/EC)       Romberg   [x] WFL    [] Pos    Describe:           Stand on Foam  [] WFL    [] Pos    Describe: NT        Standing on Rail  [] WFL    [] Pos    Describe:  NT       Sharpened Romberg [] WFL    [x] Pos    Describe: Unable to perform > 5 seconds       Single Leg Stand  [] WFL    [x] Pos    Describe: unable to maintain > 5 seconds     Motion Sensitivity:  [] Neg     [x] Pos    Describe: brief reports of lightheadedness with supine to sit transition lasting seconds, otherwise no reports o motion sensitivity with rapid head movement during examination    Computerized Dynamic Posturography:        [] Not Tested    [] WFL    Score: Other Tests:    Pain Level (0-10 scale) post treatment: 1-2/10    ASSESSMENT/Changes in Function: Per POC. Patient will continue to benefit from skilled PT services to modify and progress therapeutic interventions, address functional mobility deficits, address imbalance/dizziness and instruct in home and community integration to attain remaining goals. [x]  See Plan of Care  []  See progress note/recertification  []  See Discharge Summary          Progress towards goals / Updated goals:  Per POC.     PLAN  []  Upgrade activities as tolerated     [x]  Continue plan of care  []  Update interventions per flow sheet       []  Discharge due to:_  [x]  Other:_ VOR interventions and higher level balance progressions     Reed Owens, PT, DPT, ATC 9/26/2019  10:43 AM

## 2019-10-01 ENCOUNTER — HOSPITAL ENCOUNTER (OUTPATIENT)
Dept: PHYSICAL THERAPY | Age: 69
Discharge: HOME | End: 2019-10-01
Payer: MEDICARE

## 2019-10-01 PROCEDURE — 97112 NEUROMUSCULAR REEDUCATION: CPT

## 2019-10-01 NOTE — PROGRESS NOTES
PT DAILY TREATMENT NOTE - [facility]     Patient Name: David Mathews  Date:10/1/2019  : 1950  [x]  Patient  Verified  Payor: VA MEDICARE / Plan: VA MEDICARE PART A & B / Product Type: Medicare /    In time:11:01am  Out time:11:29am  Total Treatment Time (min): 29  Total Timed Codes (min): 29  1:1 Treatment Time ( only): 29   Visit #: 2 of     Treatment Area: Unsteadiness on feet [R26.81]    SUBJECTIVE  Pain Level (0-10 scale): 0/10  Any medication changes, allergies to medications, adverse drug reactions, diagnosis change, or new procedure performed?: [x] No    [] Yes (see summary sheet for update)  Subjective functional status/changes:   [] No changes reported  Pt reports difficulty coordinating moving her head quickly with her VOR HEP. She reports dizziness is the same, though none upon arrival when seated. OBJECTIVE  28 min Neuromuscular Re-education:  [x]  See flow sheet :   Rationale: improve coordination, improve balance and increase proprioception  to improve the patients ability to improve dizziness and reduce instability with high level balance activities. With   [] TE   [] TA   [] neuro   [] other: Patient Education: [x] Review HEP    [] Progressed/Changed HEP based on:   [] positioning   [] body mechanics   [] transfers   [] heat/ice application    [x] other: advised pt to trial VOR in standing as well as gait with VOR and MSR gait. Advised to use a hallway or kitchen counter to have a handhold in the event of LOB. Pt verbalized understanding.       Other Objective/Functional Measures: minimal instability with standing with rapid head movement with stance on compliant surfaces or with gait, improved with repetition, minimal shahnaz LOB     Pain Level (0-10 scale) post treatment: 1/10 \"dizzy\"    ASSESSMENT/Changes in Function: Pt demonstrates some mild instability with rapid head movement, particularly on compliant surfaces or during dynamic gait, though minimal to no shahnaz LOB noted. Mild path deviation with initial dynamic gait stability interventions with improvement noted with repetition. She does report mild dizziness provocation post treatment. Continue progressing balance and VOR interventions as tolerated. Patient will continue to benefit from skilled PT services to modify and progress therapeutic interventions, address functional mobility deficits, address imbalance/dizziness and instruct in home and community integration to attain remaining goals. []  See Plan of Care  []  See progress note/recertification  []  See Discharge Summary         Progress towards goals / Updated goals:  Short Term Goals: To be accomplished in 2 weeks:              1. Patient will report performance of HEP at least 2 times per day to facilitate improved outcomes and improved self management. Pt reports compliance 10/1/2019     Long Term Goals: To be accomplished in 8 weeks:                 1. Pt will demonstrate (-) dynamic visual acuity test to indicate improve vestibular function and reduce instability. 2. Pt will demonstrate 24/24 DGI score to indicate improved gait stability. 3. Pt will demonstrate ability to perform tandem balance for 30 seconds void of > 1 balance error to indicate improved static balance.     PLAN  []  Upgrade activities as tolerated     [x]  Continue plan of care  []  Update interventions per flow sheet       []  Discharge due to:_  []  Other:_      Author Carmen, PT, DPT, ATC 10/1/2019  11:04 AM    Future Appointments   Date Time Provider Demi Kincaid   10/4/2019  2:00 PM Jewels Richard PT [facility]   10/7/2019  8:00 AM [facility] INFUSION NURSE 3 [facility]   10/8/2019  9:00 [facility]   10/11/2019  9[facility]00 [facility]   10/15/2019 10:00 [facility]   10/18/2019  9:00 AM [facility]   10/22/2019  9:00 AM [facility]   10/25/2019 9:00 [facility]

## 2019-10-04 ENCOUNTER — HOSPITAL ENCOUNTER (OUTPATIENT)
Dept: PHYSICAL THERAPY | Age: 69
Discharge: HOME | End: 2019-10-04
Payer: MEDICARE

## 2019-10-04 PROCEDURE — 97112 NEUROMUSCULAR REEDUCATION: CPT

## 2019-10-04 PROCEDURE — 97110 THERAPEUTIC EXERCISES: CPT

## 2019-10-04 NOTE — PROGRESS NOTES
PT DAILY TREATMENT NOTE 10-18    Patient Name: Michelle Wagoner  Date:10/4/2019  : 1950  [x]  Patient  Verified  Payor: VA MEDICARE / Plan: VA MEDICARE PART A & B / Product Type: Medicare /    In time:2:00  Out time:2:34  Total Treatment Time (min): 34  Visit #: 3 of     Medicare/BCBS Only   Total Timed Codes (min):  34 1:1 Treatment Time:  34       Treatment Area: Unsteadiness on feet [R26.81]    SUBJECTIVE  Pain Level (0-10 scale): 0/10  Any medication changes, allergies to medications, adverse drug reactions, diagnosis change, or new procedure performed?: [x] No    [] Yes (see summary sheet for update)  Subjective functional status/changes:   [] No changes reported  The patient states that her dizziness is about the same, but she just feels \"unsteady\" with her balance. OBJECTIVE  24 min Therapeutic Exercise:  [x] See flow sheet :   Rationale: increase ROM and increase strength to improve the patients ability to improve ADL ease. 10 min Neuromuscular Re-education:  [x]  See flow sheet :   Rationale: improve coordination, improve balance and increase proprioception  to improve the patients ability to improve ADL ease. With   [] TE   [] TA   [] neuro   [] other: Patient Education: [x] Review HEP    [] Progressed/Changed HEP based on:   [] positioning   [] body mechanics   [] transfers   [] heat/ice application    [] other:      Other Objective/Functional Measures:   Unable to attain 30\" in tandem without assist of UEs. Good stability with large isabel negotiation noting no apparent LOB, but demonstrated effective weight shifting. Pain Level (0-10 scale) post treatment: 0/10    ASSESSMENT/Changes in Function: The patient did report VORs contributed to transient dizziness report, which resolved with rest and fixation. Difficulty following diagonal patterns with VORs during session. The patient left without any increase in dizziness reports following session.      Patient will continue to benefit from skilled PT services to modify and progress therapeutic interventions, address functional mobility deficits, address ROM deficits, address strength deficits, analyze and address soft tissue restrictions, analyze and cue movement patterns, analyze and modify body mechanics/ergonomics, assess and modify postural abnormalities and instruct in home and community integration to attain remaining goals. []  See Plan of Care  []  See progress note/recertification  []  See Discharge Summary         Progress towards goals / Updated goals:  Short Term Goals: To be accomplished in 2 weeks:              9. Patient will report performance of HEP at least 2 times per day to facilitate improved outcomes and improved self management. Pt reports compliance 10/1/2019     Long Term Goals: To be accomplished in 8 weeks:              1. Pt will demonstrate (-) dynamic visual acuity test to indicate improve vestibular function and reduce instability.             2. Pt will demonstrate 24/24 DGI score to indicate improved gait stability.             3. Pt will demonstrate ability to perform tandem balance for 30 seconds void of > 1 balance error to indicate improved static balance.  Not met 10/04/2019    PLAN  []  Upgrade activities as tolerated     [x]  Continue plan of care  []  Update interventions per flow sheet       []  Discharge due to:_  []  Other:_      Eliza Silver, PT 10/4/2019  3:16 PM    Future Appointments   Date Time Provider Demi Kincaid   10/7/2019  8:00 AM [facility] INFUSION NURSE 3 [facility]   10/8/2019  9:00 AM Joan Russell, PT [facility]   10/11/2019  9:00 AM Praveen Renteria [facility]   10/15/2019 10:00 AM Abigail Padilla, PT [facility]   10/18/2019  9:00 AM Synfer Bon [facility]   10/22/2019  9:00 AM Synfer Bon [facility]   10/25/2019  9:00 AM Synfer Bon [facility]   11/4/2019  8:00 AM HBV INFUSION NURSE 3 [facility]

## 2019-10-07 ENCOUNTER — HOSPITAL ENCOUNTER (OUTPATIENT)
Dept: INFUSION THERAPY | Age: 69
Discharge: HOME | End: 2019-10-07
Payer: MEDICARE

## 2019-10-07 VITALS
RESPIRATION RATE: 16 BRPM | SYSTOLIC BLOOD PRESSURE: 129 MMHG | HEART RATE: 73 BPM | BODY MASS INDEX: 23.26 KG/M2 | OXYGEN SATURATION: 99 % | DIASTOLIC BLOOD PRESSURE: 77 MMHG | WEIGHT: 135.5 LBS | TEMPERATURE: 97 F

## 2019-10-07 LAB
ALBUMIN SERPL-MCNC: 3.7 G/DL (ref 3.4–5)
ALBUMIN/GLOB SERPL: 1.1 {RATIO} (ref 0.8–1.7)
ALP SERPL-CCNC: 40 U/L (ref 45–117)
ALT SERPL-CCNC: 22 U/L (ref 13–56)
ANION GAP SERPL CALC-SCNC: 5 MMOL/L (ref 3–18)
AST SERPL-CCNC: 15 U/L (ref 10–38)
BASOPHILS # BLD: 0 K/UL (ref 0–0.1)
BASOPHILS NFR BLD: 1 % (ref 0–2)
BILIRUB SERPL-MCNC: 0.3 MG/DL (ref 0.2–1)
BUN SERPL-MCNC: 22 MG/DL (ref 7–18)
BUN/CREAT SERPL: 26 (ref 12–20)
CALCIUM SERPL-MCNC: 8.7 MG/DL (ref 8.5–10.1)
CHLORIDE SERPL-SCNC: 108 MMOL/L (ref 100–111)
CO2 SERPL-SCNC: 30 MMOL/L (ref 21–32)
CREAT SERPL-MCNC: 0.85 MG/DL (ref 0.6–1.3)
DIFFERENTIAL METHOD BLD: ABNORMAL
EOSINOPHIL # BLD: 0.1 K/UL (ref 0–0.4)
EOSINOPHIL NFR BLD: 2 % (ref 0–5)
ERYTHROCYTE [DISTWIDTH] IN BLOOD BY AUTOMATED COUNT: 14.2 % (ref 11.6–14.5)
GLOBULIN SER CALC-MCNC: 3.4 G/DL (ref 2–4)
GLUCOSE SERPL-MCNC: 93 MG/DL (ref 74–99)
HCT VFR BLD AUTO: 39.3 % (ref 35–45)
HGB BLD-MCNC: 13.2 G/DL (ref 12–16)
LYMPHOCYTES # BLD: 1.9 K/UL (ref 0.9–3.6)
LYMPHOCYTES NFR BLD: 43 % (ref 21–52)
MCH RBC QN AUTO: 30.7 PG (ref 24–34)
MCHC RBC AUTO-ENTMCNC: 33.6 G/DL (ref 31–37)
MCV RBC AUTO: 91.4 FL (ref 74–97)
MONOCYTES # BLD: 0.4 K/UL (ref 0.05–1.2)
MONOCYTES NFR BLD: 10 % (ref 3–10)
NEUTS SEG # BLD: 2 K/UL (ref 1.8–8)
NEUTS SEG NFR BLD: 44 % (ref 40–73)
PLATELET # BLD AUTO: 289 K/UL (ref 135–420)
PMV BLD AUTO: 9.9 FL (ref 9.2–11.8)
POTASSIUM SERPL-SCNC: 3.8 MMOL/L (ref 3.5–5.5)
PROT SERPL-MCNC: 7.1 G/DL (ref 6.4–8.2)
RBC # BLD AUTO: 4.3 M/UL (ref 4.2–5.3)
SODIUM SERPL-SCNC: 143 MMOL/L (ref 136–145)
WBC # BLD AUTO: 4.4 K/UL (ref 4.6–13.2)

## 2019-10-07 PROCEDURE — 96366 THER/PROPH/DIAG IV INF ADDON: CPT

## 2019-10-07 PROCEDURE — 96365 THER/PROPH/DIAG IV INF INIT: CPT

## 2019-10-07 PROCEDURE — 74011250637 HC RX REV CODE- 250/637: Performed by: ALLERGY & IMMUNOLOGY

## 2019-10-07 PROCEDURE — 80053 COMPREHEN METABOLIC PANEL: CPT

## 2019-10-07 PROCEDURE — 82784 ASSAY IGA/IGD/IGG/IGM EACH: CPT

## 2019-10-07 PROCEDURE — 74011250636 HC RX REV CODE- 250/636: Performed by: INTERNAL MEDICINE

## 2019-10-07 PROCEDURE — 85025 COMPLETE CBC W/AUTO DIFF WBC: CPT

## 2019-10-07 PROCEDURE — 74011250636 HC RX REV CODE- 250/636: Performed by: ALLERGY & IMMUNOLOGY

## 2019-10-07 RX ORDER — DIPHENHYDRAMINE HCL 25 MG
25 CAPSULE ORAL ONCE
Status: ACTIVE | OUTPATIENT
Start: 2019-10-07 | End: 2019-10-07

## 2019-10-07 RX ORDER — DIPHENHYDRAMINE HYDROCHLORIDE 50 MG/ML
50 INJECTION, SOLUTION INTRAMUSCULAR; INTRAVENOUS
Status: ACTIVE | OUTPATIENT
Start: 2019-10-07 | End: 2019-10-07

## 2019-10-07 RX ORDER — SODIUM CHLORIDE 9 MG/ML
250 INJECTION, SOLUTION INTRAVENOUS CONTINUOUS
Status: DISCONTINUED | OUTPATIENT
Start: 2019-10-07 | End: 2019-10-08 | Stop reason: HOSPADM

## 2019-10-07 RX ORDER — ACETAMINOPHEN 325 MG/1
650 TABLET ORAL ONCE
Status: COMPLETED | OUTPATIENT
Start: 2019-10-07 | End: 2019-10-07

## 2019-10-07 RX ORDER — ACETAMINOPHEN 325 MG/1
650 TABLET ORAL
Status: ACTIVE | OUTPATIENT
Start: 2019-10-07 | End: 2019-10-07

## 2019-10-07 RX ORDER — SODIUM CHLORIDE 0.9 % (FLUSH) 0.9 %
10-40 SYRINGE (ML) INJECTION AS NEEDED
Status: DISCONTINUED | OUTPATIENT
Start: 2019-10-07 | End: 2019-10-11 | Stop reason: HOSPADM

## 2019-10-07 RX ADMIN — Medication 10 ML: at 08:30

## 2019-10-07 RX ADMIN — IMMUNE GLOBULIN INTRAVENOUS (HUMAN) 20 G: 5 INJECTION, SOLUTION INTRAVENOUS at 09:00

## 2019-10-07 RX ADMIN — ACETAMINOPHEN 650 MG: 325 TABLET ORAL at 08:20

## 2019-10-07 RX ADMIN — IMMUNE GLOBULIN INTRAVENOUS (HUMAN) 10 G: 5 INJECTION, SOLUTION INTRAVENOUS at 10:55

## 2019-10-07 RX ADMIN — SODIUM CHLORIDE 250 ML: 9 INJECTION, SOLUTION INTRAVENOUS at 08:40

## 2019-10-07 NOTE — PROGRESS NOTES
[facility] Progress Note    Date: 2019    Name: Yomi Thomas    MRN: [id number]         : 1950      Ms. Simona Johnson arrived in the [facility] today, at 0800, in stable condition, here for Q 4 Week,  IVIG Infusion. She was assessed and education was provided. Ms. Amanda Avina vitals were reviewed. Visit Vitals  /76 (BP 1 Location: Left arm, BP Patient Position: Sitting)   Pulse 77   Temp 97.5 °F (36.4 °C)   Resp 16   Wt 61.5 kg (135 lb 8 oz)   SpO2 99%   Breastfeeding? No   BMI 23.26 kg/m²             PIV # 25 G was established in her left AC at 0830, without incident, and blood was drawn for ordered labs (CBC, CMP, & IgG). The CBC was processed in house, and the remaining labs were sent out to be processed. The preliminary CBC results were as follows:    WBC 5.0  No preliminary ANC was available  HGB 13.1  HCT 40.0        The remaining lab results were as follows, including the final CBC results:      Recent Results (from the past 12 hour(s))   METABOLIC PANEL, COMPREHENSIVE    Collection Time: 10/07/19  8:30 AM   Result Value Ref Range    Sodium 143 136 - 145 mmol/L    Potassium 3.8 3.5 - 5.5 mmol/L    Chloride 108 100 - 111 mmol/L    CO2 30 21 - 32 mmol/L    Anion gap 5 3.0 - 18 mmol/L    Glucose 93 74 - 99 mg/dL    BUN 22 (H) 7.0 - 18 MG/DL    Creatinine 0.85 0.6 - 1.3 MG/DL    BUN/Creatinine ratio 26 (H) 12 - 20      GFR est AA >60 >60 ml/min/1.73m2    GFR est non-AA >60 >60 ml/min/1.73m2    Calcium 8.7 8.5 - 10.1 MG/DL    Bilirubin, total 0.3 0.2 - 1.0 MG/DL    ALT (SGPT) 22 13 - 56 U/L    AST (SGOT) 15 10 - 38 U/L    Alk.  phosphatase 40 (L) 45 - 117 U/L    Protein, total 7.1 6.4 - 8.2 g/dL    Albumin 3.7 3.4 - 5.0 g/dL    Globulin 3.4 2.0 - 4.0 g/dL    A-G Ratio 1.1 0.8 - 1.7     CBC WITH AUTOMATED DIFF    Collection Time: 10/07/19  8:30 AM   Result Value Ref Range    WBC 4.4 (L) 4.6 - 13.2 K/uL    RBC 4.30 4.20 - 5.30 M/uL    HGB 13.2 12.0 - 16.0 g/dL    HCT 39.3 35.0 - 45.0 %    MCV 91.4 74.0 - 97.0 FL    MCH 30.7 24.0 - 34.0 PG    MCHC 33.6 31.0 - 37.0 g/dL    RDW 14.2 11.6 - 14.5 %    PLATELET 417 801 - 316 K/uL    MPV 9.9 9.2 - 11.8 FL    NEUTROPHILS 44 40 - 73 %    LYMPHOCYTES 43 21 - 52 %    MONOCYTES 10 3 - 10 %    EOSINOPHILS 2 0 - 5 %    BASOPHILS 1 0 - 2 %    ABS. NEUTROPHILS 2.0 1.8 - 8.0 K/UL    ABS. LYMPHOCYTES 1.9 0.9 - 3.6 K/UL    ABS. MONOCYTES 0.4 0.05 - 1.2 K/UL    ABS. EOSINOPHILS 0.1 0.0 - 0.4 K/UL    ABS. BASOPHILS 0.0 0.0 - 0.1 K/UL    DF AUTOMATED           Pre-medication consisting of PO Tylenol 650 mg was administered per order, and without incident. Ms. Geena Everett refused the PO Benadryl, because she stated that it just made her too drowsy. IVIG 30 grams (Immunoglobulin 10 %) (Flebogamma) , was administered IV, per order, and without incident. The IVIG was infused with the following rate titration: 33 ml/hr X 30 minutes, 66 ml/hr X 30 minutes, 132 ml/hr X 30 minutes, & 264 ml/hr until completion. After completion of the IVIG, the PIV was flushed very well per protocol, and then, the PIV was removed and gauze/bandaid was applied. Ms. Catarina Dominguez tolerated well, and had no complaints. Ms. Catarina Dominguez was discharged from [place] [address] in stable condition at 1145. .. She is to return in 4 weeks, on Monday, 11-4-19, at 0800,  for her next appointment, for her next IVIG Infusion.      Nixon Mathur RN  October 7, 2019  10:48 AM

## 2019-10-08 ENCOUNTER — HOSPITAL ENCOUNTER (OUTPATIENT)
Dept: PHYSICAL THERAPY | Age: 69
Discharge: HOME | End: 2019-10-08
Payer: MEDICARE

## 2019-10-08 LAB — IGG SERPL-MCNC: 843 MG/DL (ref 700–1600)

## 2019-10-08 PROCEDURE — 97112 NEUROMUSCULAR REEDUCATION: CPT

## 2019-10-08 NOTE — PROGRESS NOTES
PT DAILY TREATMENT NOTE 10-18    Patient Name: Maximilian Fernandez  Date:10/8/2019  : 1950  [x]  Patient  Verified  Payor: VA MEDICARE / Plan: VA MEDICARE PART A & B / Product Type: Medicare /    In time:9:01  Out time:9:35  Total Treatment Time (min): 34  Visit #: 4 of 8    Medicare/BCBS Only   Total Timed Codes (min):  34 1:1 Treatment Time:  34       Treatment Area: Unsteadiness on feet [R26.81]    SUBJECTIVE  Pain Level (0-10 scale): 0/10  Any medication changes, allergies to medications, adverse drug reactions, diagnosis change, or new procedure performed?: [x] No    [] Yes (see summary sheet for update)  Subjective functional status/changes:   [] No changes reported  The patient states that she feels she has low level dizziness that seems to return to baseline fairly quickly. OBJECTIVE  34 min Neuromuscular Re-education:  []  See flow sheet :   Rationale: improve coordination, improve balance and increase proprioception  to improve the patients ability to improve ADL ease. With   [] TE   [] TA   [] neuro   [] other: Patient Education: [x] Review HEP    [] Progressed/Changed HEP based on:   [] positioning   [] body mechanics   [] transfers   [] heat/ice application    [] other:      Other Objective/Functional Measures:   Unable to attain tandem airex for > 14\" but was able to perform with slight modification. The patient was challenged with bosu lunge position, utilizing slow horizontal head turns requiring occasional assist for balance correction. During tandem walking, she was able to average 3-4 steps consecutively prior to requiring stepping strategy for correction. Pain Level (0-10 scale) post treatment: 0/10    ASSESSMENT/Changes in Function: overall, the patient was able to perform higher level difficulty interventions fairly well.  Educated the patient that significant impairment was not appreciated during the session, and that the therapist was encouraged by her level of function. Re-iterated plan to progress balance with intent to maximize balance over the next few weeks. The patient thanked therapist for session and was in agreement to the plan moving forward. Patient will continue to benefit from skilled PT services to modify and progress therapeutic interventions, analyze and cue movement patterns, analyze and modify body mechanics/ergonomics, address imbalance/dizziness and instruct in home and community integration to attain remaining goals. []  See Plan of Care  []  See progress note/recertification  []  See Discharge Summary         Progress towards goals / Updated goals:  Short Term Goals: To be accomplished in 2 weeks:              5. Patient will report performance of HEP at least 2 times per day to facilitate improved outcomes and improved self management. Pt reports compliance 10/1/2019     Long Term Goals: To be accomplished in 8 weeks:              1. Pt will demonstrate (-) dynamic visual acuity test to indicate improve vestibular function and reduce instability.             2. Pt will demonstrate 24/24 DGI score to indicate improved gait stability.             3. Pt will demonstrate ability to perform tandem balance for 30 seconds void of > 1 balance error to indicate improved static balance.  Not met 10/04/2019    PLAN  []  Upgrade activities as tolerated     [x]  Continue plan of care  []  Update interventions per flow sheet       []  Discharge due to:_  []  Other:_      Lorena Castillo, PT 10/8/2019  9:39 AM    Future Appointments   Date Time Provider Demi Kincaid   10/11/2019  9:00 AM [facility]   10/15/2019 10:[facility]   10/18/2019  9:00 AM [facility]   10/22/2019  9:00 AM [facility]   10/25/2019  9:00 AM [facility]   11/4/2019  8:00 AM HBV INFUSION NURSE 3 [facility]

## 2019-10-11 ENCOUNTER — HOSPITAL ENCOUNTER (OUTPATIENT)
Dept: PHYSICAL THERAPY | Age: 69
Discharge: HOME | End: 2019-10-11
Payer: MEDICARE

## 2019-10-11 PROCEDURE — 97112 NEUROMUSCULAR REEDUCATION: CPT

## 2019-10-11 NOTE — PROGRESS NOTES
PT DAILY TREATMENT NOTE 10-18    Patient Name: Olga Evans  Date:10/11/2019  : 1950  [x]  Patient  Verified  Payor: [place] MEDICARE / Plan: [place] MEDICARE PART A & B / Product Type: Medicare /    In time:9:00am  Out time:9:32am  Total Treatment Time (min): 32  Visit #: 5 of 8    Medicare/BCBS Only   Total Timed Codes (min):  32 1:1 Treatment Time:  32       Treatment Area: Unsteadiness on feet [R26.81]    SUBJECTIVE  Pain Level (0-10 scale): 0   Any medication changes, allergies to medications, adverse drug reactions, diagnosis change, or new procedure performed?: [x] No    [] Yes (see summary sheet for update)  Subjective functional status/changes:   [] No changes reported  Pt reports things are \"the same as usual\". She reports no dizziness upon arrival today. OBJECTIVE  32 min Neuromuscular Re-education:  [x]  See flow sheet :   Rationale: improve coordination, improve balance and increase proprioception  to improve the patients ability to improve ease of daily activity and improve stability with ambulatory stability. With   [] TE   [] TA   [] neuro   [] other: Patient Education: [x] Review HEP    [] Progressed/Changed HEP based on:   [] positioning   [] body mechanics   [] transfers   [] heat/ice application    [] other:      Other Objective/Functional Measures: 2x LOB @ most with tandem airex balance performance for 30 seconds    Occasional UE assistance for balance recovery with BOSU lunge balance activities < 25% of repetitions    Good performance of dynamic gait activities void of notable LOB    Pain Level (0-10 scale) post treatment: 1-2 \"lightheaded\"    ASSESSMENT/Changes in Function: Pt reports minimal lightheadedness post treatment, but otherwise no adverse response reported. She demonstrates fairly good performance with high level balance and vestibular interventions with only occasional LOB requiring UE or step balance recovery.  Will plan to continue progressing with high level balance as tolerated over remaining visits. Patient will continue to benefit from skilled PT services to modify and progress therapeutic interventions, address functional mobility deficits, analyze and cue movement patterns, address imbalance/dizziness and instruct in home and community integration to attain remaining goals. []  See Plan of Care  []  See progress note/recertification  []  See Discharge Summary         Progress towards goals / Updated goals:  Short Term Goals: To be accomplished in 2 weeks:              6. Patient will report performance of HEP at least 2 times per day to facilitate improved outcomes and improved self management. Pt reports compliance 10/1/2019     Long Term Goals: To be accomplished in 8 weeks:              1. Pt will demonstrate (-) dynamic visual acuity test to indicate improve vestibular function and reduce instability.             2. Pt will demonstrate 24/24 DGI score to indicate improved gait stability.             3. Pt will demonstrate ability to perform tandem balance for 30 seconds void of > 1 balance error to indicate improved static balance.  Not met 10/04/2019    PLAN  [x]  Upgrade activities as tolerated     []  Continue plan of care  []  Update interventions per flow sheet       []  Discharge due to:_  []  Other:_      Author Carmen, PT, DPT, ATC 10/11/2019  9:04 AM    Future Appointments   Date Time Provider Demi Kincaid   [facility]2019 [facility]   10/18/2019  9:00 AM Belinda Prather [facility]   10/22/2019  9:00 AM Belinda Prather [facility]   10/25/2019  9[facility]00 [facility]   11/4/2019  8:00 AM [facility] INFUSION NURSE 3 [facility]

## 2019-10-15 ENCOUNTER — HOSPITAL ENCOUNTER (OUTPATIENT)
Dept: PHYSICAL THERAPY | Age: 69
Discharge: HOME | End: 2019-10-15
Payer: MEDICARE

## 2019-10-15 PROCEDURE — 97112 NEUROMUSCULAR REEDUCATION: CPT

## 2019-10-15 NOTE — PROGRESS NOTES
PT DAILY TREATMENT NOTE 10-18    Patient Name: Alea Villanueva  Date:10/15/2019  : 1950  [x]  Patient  Verified  Payor: VA MEDICARE / Plan: VA MEDICARE PART A & B / Product Type: Medicare /    In time:10:00  Out time:10:30  Total Treatment Time (min): 30  Visit #: 6 of 8    Medicare/BCBS Only   Total Timed Codes (min):  30 1:1 Treatment Time:  30       Treatment Area: Unsteadiness on feet [R26.81]    SUBJECTIVE  Pain Level (0-10 scale): 0/10  Any medication changes, allergies to medications, adverse drug reactions, diagnosis change, or new procedure performed?: [x] No    [] Yes (see summary sheet for update)   Subjective functional status/changes:   [] No changes reported   The patient states she feels a little more steady with her feet together standing. OBJECTIVE   30 min Neuromuscular Re-education:  [x]  See flow sheet :   Rationale: improve coordination, improve balance and increase proprioception  to improve the patients ability to improve ADL ease. With   [] TE   [] TA   [] neuro   [] other: Patient Education: [x] Review HEP    [] Progressed/Changed HEP based on:   [] positioning   [] body mechanics   [] transfers   [] heat/ice application    [] other:      Other Objective/Functional Measures: Only one LOB during tandem stance 2 x 30\". No LOB during large isabel negotiation. Pain Level (0-10 scale) post treatment: 0/10    ASSESSMENT/Changes in Function: The patient continues to demonstrate higher level balance in clinic with little deviation appreciated during dynamic gait activities. Continue to progress interventions as tolerable.      Patient will continue to benefit from skilled PT services to modify and progress therapeutic interventions, address functional mobility deficits, address ROM deficits, address strength deficits, analyze and address soft tissue restrictions, analyze and cue movement patterns, analyze and modify body mechanics/ergonomics, assess and modify postural abnormalities and instruct in home and community integration to attain remaining goals. []  See Plan of Care  []  See progress note/recertification  []  See Discharge Summary         Progress towards goals / Updated goals:  Short Term Goals: To be accomplished in 2 weeks:              8. Patient will report performance of HEP at least 2 times per day to facilitate improved outcomes and improved self management. Pt reports compliance 10/1/2019     Long Term Goals: To be accomplished in 8 weeks:              1. Pt will demonstrate (-) dynamic visual acuity test to indicate improve vestibular function and reduce instability.             2. Pt will demonstrate 24/24 DGI score to indicate improved gait stability.             3. Pt will demonstrate ability to perform tandem balance for 30 seconds void of > 1 balance error to indicate improved static balance.  Not met 10/04/2019    PLAN  []  Upgrade activities as tolerated     [x]  Continue plan of care  []  Update interventions per flow sheet       []  Discharge due to:_  []  Other:_      Sandy Cintron, PT 10/15/2019  11:32 AM    Future Appointments   Date Time Provider Demi Kincaid   10/18/2019  9:00 AM Jacobo Mast [facility]   10/22/2019  9:00 AM Jacobo Mast [facility]   10/25/2019  9:00 AM Jacobo Mast [facility]   11/4/2019  8:00 AM [facility] INFUSION NURSE 3 [facility]

## 2019-10-18 ENCOUNTER — HOSPITAL ENCOUNTER (OUTPATIENT)
Dept: PHYSICAL THERAPY | Age: 69
Discharge: HOME | End: 2019-10-18
Payer: MEDICARE

## 2019-10-18 PROCEDURE — 97112 NEUROMUSCULAR REEDUCATION: CPT

## 2019-10-18 NOTE — PROGRESS NOTES
PT DAILY TREATMENT NOTE 10-18    Patient Name: iDya Vieira  Date:10/18/2019  : 1950  [x]  Patient  Verified  Payor: VA MEDICARE / Plan: VA MEDICARE PART A & B / Product Type: Medicare /    In time:9:00am  Out time:9:29am  Total Treatment Time (min): 29  Visit #: 7 of 8    Medicare/BCBS Only   Total Timed Codes (min):  29 1:1 Treatment Time:  29       Treatment Area: Unsteadiness on feet [R26.81]    SUBJECTIVE  Pain Level (0-10 scale): 0  Any medication changes, allergies to medications, adverse drug reactions, diagnosis change, or new procedure performed?: [x] No    [] Yes (see summary sheet for update)  Subjective functional status/changes:   [] No changes reported  Pt reports she does feel she has better static balance, such as when standing on the scale at home. She reports continued lightheadedness with activity. OBJECTIVE  29 min Neuromuscular Re-education:  [x]  See flow sheet :   Rationale: improve coordination, improve balance and increase proprioception  to improve the patients ability to improve stability and reduce fall risk. With   [] TE   [] TA   [] neuro   [] other: Patient Education: [x] Review HEP    [] Progressed/Changed HEP based on:   [] positioning   [] body mechanics   [] transfers   [] heat/ice application    [] other:      Other Objective/Functional Measures: 3x LOB during tandem stance with independent step and UE recovery    No LOB during dynamic gait activities     Pain Level (0-10 scale) post treatment: 1 \"lightheaded\"    ASSESSMENT/Changes in Function: Patient demonstrates higher level balance with little deviation with some higher level static balance activities and none appreciable during dynamic gait. She does report subjective improvement in balance. Will plan to progress to DC NV with updated HEP PRN.     Patient will continue to benefit from skilled PT services to modify and progress therapeutic interventions, address functional mobility deficits, address ROM deficits, address strength deficits, analyze and address soft tissue restrictions, analyze and cue movement patterns and instruct in home and community integration to attain remaining goals. []  See Plan of Care  []  See progress note/recertification  []  See Discharge Summary         Progress towards goals / Updated goals:  Short Term Goals: To be accomplished in 2 weeks:              5. Patient will report performance of HEP at least 2 times per day to facilitate improved outcomes and improved self management. Pt reports compliance 10/1/2019     Long Term Goals: To be accomplished in 8 weeks:              1. Pt will demonstrate (-) dynamic visual acuity test to indicate improve vestibular function and reduce instability.             2. Pt will demonstrate 24/24 DGI score to indicate improved gait stability.             3. Pt will demonstrate ability to perform tandem balance for 30 seconds void of > 1 balance error to indicate improved static balance.  Not met 10/04/2019    PLAN  []  Upgrade activities as tolerated     [x]  Continue plan of care  []  Update interventions per flow sheet       []  Discharge due to:_  []  Other:_      Aguilar Marker, PT, DPT, ATC 10/18/2019  10:09 AM    Future Appointments   Date Time Provider Demi Kincaid   10/22/2019  9[facility]   10/25/2019  9:00 AM Geena Mcclure [facility]   11/4/2019  8:00 AM [facility] INFUSION NURSE 3 [facility]

## 2019-10-22 ENCOUNTER — HOSPITAL ENCOUNTER (OUTPATIENT)
Dept: PHYSICAL THERAPY | Age: 69
Discharge: HOME | End: 2019-10-22
Payer: MEDICARE

## 2019-10-22 PROCEDURE — 97112 NEUROMUSCULAR REEDUCATION: CPT

## 2019-10-22 NOTE — PROGRESS NOTES
PT DAILY TREATMENT NOTE 10-18    Patient Name: Hannah Stone  Date:10/22/2019  : 1950  [x]  Patient  Verified  Payor: VA MEDICARE / Plan: VA MEDICARE PART A & B / Product Type: Medicare /    In time:9:00am  Out time:9:30am  Total Treatment Time (min): 30  Visit #: 8 of 8    Medicare/BCBS Only   Total Timed Codes (min):  30 1:1 Treatment Time:  30       Treatment Area: Unsteadiness on feet [R26.81]    SUBJECTIVE  Pain Level (0-10 scale): 1-2/10  Any medication changes, allergies to medications, adverse drug reactions, diagnosis change, or new procedure performed?: [x] No    [] Yes (see summary sheet for update)  Subjective functional status/changes:   [] No changes reported  \"Its not bad. I'm just tired. \" Pt reports overall improved balance. OBJECTIVE    30 min Neuromuscular Re-education:  [x]  See flow sheet :   Rationale: improve coordination, improve balance and increase proprioception  to improve the patients ability to improve balance and reduce dizziness. With   [] TE   [] TA   [] neuro   [] other: Patient Education: [x] Review HEP    [] Progressed/Changed HEP based on:   [] positioning   [] body mechanics   [] transfers   [] heat/ice application    [x] other: reviewed EP and advised performance 2 days/week for maintenance     Other Objective/Functional Measures: (-) DVA test    DGI: 24/24    Tandem balance: 1x balance error in 30 seconds x 2 repetitions    FOTO: 67 (-1), not representative of functional gains and pt reports of improved balance    Pain Level (0-10 scale) post treatment: 1    ASSESSMENT/Changes in Function: Pt demonstrates improve VOR function as evidenced by improved performance on dynamic visual acuity test, improved gait stability with DGI of 24/24, and improved static balance with narrow ESPERANZA, on compliant surfaces, and with high level balance interventions.  She reports no significant change via her FOTO score decline of 1 point, which is at odds with her objective improvement as well as subjective reports. At this time, pt has been advised to continue her HEP 2 days/week for maintenance and will be DC from skilled PT.     []  See Plan of Care  []  See progress note/recertification  []  See Discharge Summary         Progress towards goals / Updated goals:  Short Term Goals: To be accomplished in 2 weeks:              4. Patient will report performance of HEP at least 2 times per day to facilitate improved outcomes and improved self management. Pt reports compliance 10/1/2019     Long Term Goals: To be accomplished in 8 weeks:              1. Pt will demonstrate (-) dynamic visual acuity test to indicate improve vestibular function and reduce instability. Met, loss of 1 line on snellen chart 10/22/2019              2. Pt will demonstrate 24/24 DGI score to indicate improved gait stability. 24/24 10/22/2019              3. Pt will demonstrate ability to perform tandem balance for 30 seconds void of > 1 balance error to indicate improved static balance.  Met, able to perform with 1 balance error total in 2 sets of 30 seconds 10/22/2019    PLAN  []  Upgrade activities as tolerated     []  Continue plan of care  []  Update interventions per flow sheet       [x]  Discharge due to:_Goals Met   []  Other:_      Ivan Starks, PT, DPT, ATC 10/22/2019  9:04 AM    Future Appointments   Date Time Provider Demi Kincaid   10/25/2019  9:00 AM Kirti Segovia [facility]   11/4/2019  8:00 AM [facility] INFUSION NURSE 3 [facility]

## 2019-10-31 PROBLEM — D80.1 HYPOGAMMAGLOBULINEMIA (HCC): Status: ACTIVE | Noted: 2019-10-31

## 2019-10-31 RX ORDER — HYDROCORTISONE SODIUM SUCCINATE 100 MG/2ML
100 INJECTION, POWDER, FOR SOLUTION INTRAMUSCULAR; INTRAVENOUS AS NEEDED
Status: CANCELLED | OUTPATIENT
Start: 2019-11-04

## 2019-10-31 RX ORDER — DIPHENHYDRAMINE HYDROCHLORIDE 50 MG/ML
50 INJECTION, SOLUTION INTRAMUSCULAR; INTRAVENOUS AS NEEDED
Status: CANCELLED
Start: 2019-11-04

## 2019-10-31 RX ORDER — ACETAMINOPHEN 325 MG/1
650 TABLET ORAL AS NEEDED
Status: CANCELLED
Start: 2019-11-04

## 2019-10-31 RX ORDER — ONDANSETRON 2 MG/ML
8 INJECTION INTRAMUSCULAR; INTRAVENOUS AS NEEDED
Status: CANCELLED | OUTPATIENT
Start: 2019-11-04

## 2019-10-31 RX ORDER — HEPARIN 100 UNIT/ML
300-500 SYRINGE INTRAVENOUS AS NEEDED
Status: CANCELLED
Start: 2019-11-04

## 2019-10-31 RX ORDER — ALBUTEROL SULFATE 0.83 MG/ML
2.5 SOLUTION RESPIRATORY (INHALATION) AS NEEDED
Status: CANCELLED
Start: 2019-11-04

## 2019-10-31 RX ORDER — EPINEPHRINE 1 MG/ML
0.3 INJECTION, SOLUTION, CONCENTRATE INTRAVENOUS AS NEEDED
Status: CANCELLED | OUTPATIENT
Start: 2019-11-04

## 2019-10-31 RX ORDER — DIPHENHYDRAMINE HCL 25 MG
25 CAPSULE ORAL ONCE
Status: CANCELLED
Start: 2019-11-04

## 2019-11-04 ENCOUNTER — HOSPITAL ENCOUNTER (OUTPATIENT)
Dept: INFUSION THERAPY | Age: 69
Discharge: HOME | End: 2019-11-04
Payer: MEDICARE

## 2019-11-04 VITALS
TEMPERATURE: 98 F | HEART RATE: 82 BPM | SYSTOLIC BLOOD PRESSURE: 135 MMHG | DIASTOLIC BLOOD PRESSURE: 78 MMHG | RESPIRATION RATE: 16 BRPM | OXYGEN SATURATION: 100 %

## 2019-11-04 DIAGNOSIS — D80.1 HYPOGAMMAGLOBULINEMIA (HCC): Primary | ICD-10-CM

## 2019-11-04 DIAGNOSIS — D80.1 HYPOGAMMAGLOBULINEMIA (HCC): ICD-10-CM

## 2019-11-04 LAB
BASO+EOS+MONOS # BLD AUTO: 0.6 K/UL (ref 0–2.3)
BASO+EOS+MONOS NFR BLD AUTO: 14 % (ref 0.1–17)
DIFFERENTIAL METHOD BLD: NORMAL
ERYTHROCYTE [DISTWIDTH] IN BLOOD BY AUTOMATED COUNT: 13.3 % (ref 11.5–14.5)
HCT VFR BLD AUTO: 38.8 % (ref 36–48)
HGB BLD-MCNC: 12.6 G/DL (ref 12–16)
LYMPHOCYTES # BLD: 2.1 K/UL (ref 1.1–5.9)
LYMPHOCYTES NFR BLD: 44 % (ref 14–44)
MCH RBC QN AUTO: 30.4 PG (ref 25–35)
MCHC RBC AUTO-ENTMCNC: 32.5 G/DL (ref 31–37)
MCV RBC AUTO: 93.5 FL (ref 78–102)
NEUTS SEG # BLD: 2 K/UL (ref 1.8–9.5)
NEUTS SEG NFR BLD: 43 % (ref 40–70)
PLATELET # BLD AUTO: 258 K/UL (ref 140–440)
RBC # BLD AUTO: 4.15 M/UL (ref 4.1–5.1)
WBC # BLD AUTO: 4.7 K/UL (ref 4.5–13)

## 2019-11-04 PROCEDURE — 85025 COMPLETE CBC W/AUTO DIFF WBC: CPT

## 2019-11-04 PROCEDURE — 96365 THER/PROPH/DIAG IV INF INIT: CPT

## 2019-11-04 PROCEDURE — 74011250637 HC RX REV CODE- 250/637: Performed by: ALLERGY & IMMUNOLOGY

## 2019-11-04 PROCEDURE — 96366 THER/PROPH/DIAG IV INF ADDON: CPT

## 2019-11-04 PROCEDURE — 74011250636 HC RX REV CODE- 250/636: Performed by: ALLERGY & IMMUNOLOGY

## 2019-11-04 RX ORDER — SODIUM CHLORIDE 0.9 % (FLUSH) 0.9 %
10-40 SYRINGE (ML) INJECTION AS NEEDED
Status: DISPENSED | OUTPATIENT
Start: 2019-11-04 | End: 2019-11-04

## 2019-11-04 RX ORDER — HYDROCORTISONE SODIUM SUCCINATE 100 MG/2ML
100 INJECTION, POWDER, FOR SOLUTION INTRAMUSCULAR; INTRAVENOUS AS NEEDED
Status: CANCELLED | OUTPATIENT
Start: 2019-11-07

## 2019-11-04 RX ORDER — DIPHENHYDRAMINE HCL 25 MG
25 CAPSULE ORAL ONCE
Status: CANCELLED
Start: 2019-11-07

## 2019-11-04 RX ORDER — ACETAMINOPHEN 325 MG/1
650 TABLET ORAL ONCE
Status: COMPLETED | OUTPATIENT
Start: 2019-11-04 | End: 2019-11-04

## 2019-11-04 RX ORDER — ACETAMINOPHEN 325 MG/1
650 TABLET ORAL ONCE
Status: CANCELLED
Start: 2019-11-07

## 2019-11-04 RX ORDER — SODIUM CHLORIDE 9 MG/ML
25 INJECTION, SOLUTION INTRAVENOUS CONTINUOUS
Status: CANCELLED | OUTPATIENT
Start: 2019-11-07

## 2019-11-04 RX ORDER — SODIUM CHLORIDE 9 MG/ML
25 INJECTION, SOLUTION INTRAVENOUS CONTINUOUS
Status: DISPENSED | OUTPATIENT
Start: 2019-11-04 | End: 2019-11-04

## 2019-11-04 RX ORDER — EPINEPHRINE 1 MG/ML
0.3 INJECTION, SOLUTION, CONCENTRATE INTRAVENOUS AS NEEDED
Status: CANCELLED | OUTPATIENT
Start: 2019-11-07

## 2019-11-04 RX ORDER — ACETAMINOPHEN 325 MG/1
650 TABLET ORAL ONCE
Status: ACTIVE | OUTPATIENT
Start: 2019-11-04 | End: 2019-11-04

## 2019-11-04 RX ORDER — ALBUTEROL SULFATE 0.83 MG/ML
2.5 SOLUTION RESPIRATORY (INHALATION) AS NEEDED
Status: CANCELLED
Start: 2019-11-07

## 2019-11-04 RX ORDER — ACETAMINOPHEN 325 MG/1
650 TABLET ORAL AS NEEDED
Status: CANCELLED
Start: 2019-11-07

## 2019-11-04 RX ORDER — HEPARIN 100 UNIT/ML
300-500 SYRINGE INTRAVENOUS AS NEEDED
Status: CANCELLED
Start: 2019-11-07

## 2019-11-04 RX ORDER — DIPHENHYDRAMINE HYDROCHLORIDE 50 MG/ML
50 INJECTION, SOLUTION INTRAMUSCULAR; INTRAVENOUS AS NEEDED
Status: CANCELLED
Start: 2019-11-07

## 2019-11-04 RX ORDER — ONDANSETRON 2 MG/ML
8 INJECTION INTRAMUSCULAR; INTRAVENOUS AS NEEDED
Status: CANCELLED | OUTPATIENT
Start: 2019-11-07

## 2019-11-04 RX ORDER — SODIUM CHLORIDE 0.9 % (FLUSH) 0.9 %
10-40 SYRINGE (ML) INJECTION AS NEEDED
Status: CANCELLED
Start: 2019-11-07

## 2019-11-04 RX ADMIN — IMMUNE GLOBULIN INTRAVENOUS (HUMAN) 20 G: 5 INJECTION, SOLUTION INTRAVENOUS at 09:10

## 2019-11-04 RX ADMIN — Medication 10 ML: at 11:39

## 2019-11-04 RX ADMIN — IMMUNE GLOBULIN INTRAVENOUS (HUMAN) 10 G: 5 INJECTION, SOLUTION INTRAVENOUS at 11:10

## 2019-11-04 RX ADMIN — ACETAMINOPHEN 650 MG: 325 TABLET ORAL at 09:04

## 2019-11-04 NOTE — PROGRESS NOTES
[facility] Progress Note    Date: 2019    Name: Chloé Sierra    MRN: [id number]         : 1950      Ms. Inés Mathis arrived in the [facility] today, at 0800, in stable condition, here for Q 4 Week,  IVIG Infusion. She was assessed and education was provided. Ms. Antonette Moses vitals were reviewed. Patient Vitals for the past 12 hrs:   Temp Pulse Resp BP SpO2   19 1135 98 °F (36.7 °C) 82 16 135/78 100 %   19 1110 98 °F (36.7 °C) 72 16 118/74 100 %   19 1040 97.8 °F (36.6 °C) 76 16 129/82 100 %   19 1010 98.1 °F (36.7 °C) 74 16 126/75 98 %   19 0940 98 °F (36.7 °C) 71 16 133/79 100 %   19 0850 -- -- -- -- 100 %             PIV # 25 G was established in her left AC  without incident, and blood was drawn for ordered labs (CBC). The CBC was processed in house      Pre-medication consisting of PO Tylenol 650 mg was administered per order, and without incident. Ms. Hemant Wilson refused the PO Benadryl, because she stated that it just made her too drowsy. She did not want to wait 30 mins prior to starting her infusion. IVIG 30 grams (Immunoglobulin 10 %) (Flebogamma) , was administered IV, per order, and without incident. The IVIG was infused with the following rate titration: 33 ml/hr X 30 minutes, 66 ml/hr X 30 minutes, 132 ml/hr X 30 minutes, & 264 ml/hr until completion. After completion of the IVIG, the PIV was flushed very well per protocol, and then, the PIV was removed and gauze/bandaid was applied. Ms. Inés Mathis tolerated well, and had no complaints. Ms. Inés Mathis was discharged from [place] [address] in stable condition at 1140 She is to return in 4 weeks, on 19, at 0800,  for her next appointment, for her next IVIG Infusion.      Sarah Leblanc RN  2019

## 2019-12-02 ENCOUNTER — HOSPITAL ENCOUNTER (OUTPATIENT)
Dept: INFUSION THERAPY | Age: 69
End: 2019-12-02
Payer: MEDICARE

## 2019-12-02 DIAGNOSIS — D80.1 HYPOGAMMAGLOBULINEMIA (HCC): ICD-10-CM

## 2019-12-05 ENCOUNTER — HOSPITAL ENCOUNTER (OUTPATIENT)
Dept: INFUSION THERAPY | Age: 69
Discharge: HOME | End: 2019-12-05
Payer: MEDICARE

## 2019-12-05 VITALS
TEMPERATURE: 97.9 F | OXYGEN SATURATION: 100 % | BODY MASS INDEX: 23.93 KG/M2 | WEIGHT: 140.2 LBS | RESPIRATION RATE: 16 BRPM | SYSTOLIC BLOOD PRESSURE: 147 MMHG | HEIGHT: 64 IN | HEART RATE: 80 BPM | DIASTOLIC BLOOD PRESSURE: 83 MMHG

## 2019-12-05 DIAGNOSIS — D80.1 HYPOGAMMAGLOBULINEMIA (HCC): Primary | ICD-10-CM

## 2019-12-05 PROCEDURE — 74011250637 HC RX REV CODE- 250/637: Performed by: ALLERGY & IMMUNOLOGY

## 2019-12-05 PROCEDURE — 96366 THER/PROPH/DIAG IV INF ADDON: CPT

## 2019-12-05 PROCEDURE — 74011250636 HC RX REV CODE- 250/636: Performed by: ALLERGY & IMMUNOLOGY

## 2019-12-05 PROCEDURE — 96365 THER/PROPH/DIAG IV INF INIT: CPT

## 2019-12-05 RX ORDER — SODIUM CHLORIDE 9 MG/ML
25 INJECTION, SOLUTION INTRAVENOUS CONTINUOUS
Status: CANCELLED | OUTPATIENT
Start: 2019-12-30

## 2019-12-05 RX ORDER — DIPHENHYDRAMINE HCL 25 MG
25 CAPSULE ORAL ONCE
Status: ACTIVE | OUTPATIENT
Start: 2019-12-05 | End: 2019-12-05

## 2019-12-05 RX ORDER — ACETAMINOPHEN 325 MG/1
650 TABLET ORAL ONCE
Status: CANCELLED
Start: 2019-12-30

## 2019-12-05 RX ORDER — SODIUM CHLORIDE 0.9 % (FLUSH) 0.9 %
10-40 SYRINGE (ML) INJECTION AS NEEDED
Status: DISPENSED | OUTPATIENT
Start: 2019-12-05 | End: 2019-12-05

## 2019-12-05 RX ORDER — SODIUM CHLORIDE 0.9 % (FLUSH) 0.9 %
10-40 SYRINGE (ML) INJECTION AS NEEDED
Status: CANCELLED
Start: 2019-12-30

## 2019-12-05 RX ORDER — ACETAMINOPHEN 325 MG/1
650 TABLET ORAL ONCE
Status: COMPLETED | OUTPATIENT
Start: 2019-12-05 | End: 2019-12-05

## 2019-12-05 RX ORDER — ACETAMINOPHEN 325 MG/1
650 TABLET ORAL AS NEEDED
Status: CANCELLED
Start: 2019-12-30

## 2019-12-05 RX ORDER — ALBUTEROL SULFATE 0.83 MG/ML
2.5 SOLUTION RESPIRATORY (INHALATION) AS NEEDED
Status: CANCELLED
Start: 2019-12-30

## 2019-12-05 RX ORDER — DIPHENHYDRAMINE HYDROCHLORIDE 50 MG/ML
50 INJECTION, SOLUTION INTRAMUSCULAR; INTRAVENOUS AS NEEDED
Status: CANCELLED
Start: 2019-12-30

## 2019-12-05 RX ORDER — ONDANSETRON 2 MG/ML
8 INJECTION INTRAMUSCULAR; INTRAVENOUS AS NEEDED
Status: CANCELLED | OUTPATIENT
Start: 2019-12-30

## 2019-12-05 RX ORDER — HEPARIN 100 UNIT/ML
300-500 SYRINGE INTRAVENOUS AS NEEDED
Status: CANCELLED
Start: 2019-12-30

## 2019-12-05 RX ORDER — SODIUM CHLORIDE 9 MG/ML
25 INJECTION, SOLUTION INTRAVENOUS CONTINUOUS
Status: DISPENSED | OUTPATIENT
Start: 2019-12-05 | End: 2019-12-05

## 2019-12-05 RX ORDER — EPINEPHRINE 1 MG/ML
0.3 INJECTION, SOLUTION, CONCENTRATE INTRAVENOUS AS NEEDED
Status: CANCELLED | OUTPATIENT
Start: 2019-12-30

## 2019-12-05 RX ORDER — HYDROCORTISONE SODIUM SUCCINATE 100 MG/2ML
100 INJECTION, POWDER, FOR SOLUTION INTRAMUSCULAR; INTRAVENOUS AS NEEDED
Status: CANCELLED | OUTPATIENT
Start: 2019-12-30

## 2019-12-05 RX ORDER — DIPHENHYDRAMINE HCL 25 MG
25 CAPSULE ORAL ONCE
Status: CANCELLED
Start: 2019-12-30

## 2019-12-05 RX ADMIN — Medication 20 ML: at 12:38

## 2019-12-05 RX ADMIN — ACETAMINOPHEN 650 MG: 325 TABLET ORAL at 09:38

## 2019-12-05 RX ADMIN — IMMUNE GLOBULIN INTRAVENOUS (HUMAN) 10 G: 5 INJECTION, SOLUTION INTRAVENOUS at 10:10

## 2019-12-05 RX ADMIN — Medication 10 ML: at 09:50

## 2019-12-05 RX ADMIN — IMMUNE GLOBULIN INTRAVENOUS (HUMAN) 20 G: 5 INJECTION, SOLUTION INTRAVENOUS at 11:23

## 2019-12-05 NOTE — PROGRESS NOTES
[facility] Progress Note    Date: 2019    Name: Samia Lewis    MRN: [id number]         : 1950      Ms. Dez Quiroga arrived to [facility] at 3769. Pt is here for Q 4 Week IVIG Infusion. Ms. Dez Quiroga was assessed and education was provided. Ms. Familia Thornton vitals were reviewed. Visit Vitals  /85 (BP 1 Location: Right arm, BP Patient Position: Sitting)   Pulse 86   Temp 98 °F (36.7 °C)   Resp 16   Ht 5' 4\" (1.626 m)   Wt 63.6 kg (140 lb 3.2 oz)   SpO2 100%   BMI 24.07 kg/m²       Tylenol 650mg administered as ordered for pre-medication. Pt refused Benadryl.    22g IV inserted in patient's left AC x1 attempt. Brisk blood return/ flushes without difficulty. IVIG 30 grams (Flebogamma) administered as ordered with the following rate titrations: 33 ml/hr x 30 minutes, 66 ml/hr x 30 minutes, 132 ml/hr x 30 minutes, and 264 ml/hr until completion. Pt's VS remained stable throughout infusion and pt denied complaints. Pt's IV flushed w/ NS. Removed/ intact. Gauze/ coban to site. Pt's armband removed & shredded. Ms. Dez Quiroga was discharged from [place] [address] in stable condition at 1235. She is to return on 20 at 0800 for her next appointment.      Carla Soria RN  2019

## 2019-12-20 NOTE — PROGRESS NOTES
[facility]  [address]  [place], [address]  [phone] fax    Physical Therapy Discharge Summary    Patient name: Olga Evans Start of Care: 2019   Referral source: Danitza Snyder MD : 1950   Medical/Treatment Diagnosis: Unsteadiness on feet [R26.81]  Payor: Andres Ordoñez / Plan: VA MEDICARE PART A & B / Product Type: Medicare /  Onset Date:years ago     Prior Hospitalization: see medical history Provider#: [id number]   Medications: Verified on Patient Summary List     Comorbidities: postural hypotension, craniotomy 2013 to remove small meningioma on \"left side on the right leg motor pathways\", diabetic neuropathy, colles fracture several years ago, left hand carpal fracture, fibromyalgia, depression, osteoporosis, arthritis, diabetes, SOB with exertion   Prior Level of Function: mild instability and falls occasionally for many years, no use of AD for assistance  Visits from Start of Care: 8    Missed Visits: 0  Reporting Period : 2019 to 10/22/2019    Summary of Care:  Progress towards goals / Updated goals:  Short Term Goals: To be accomplished in 2 weeks:              1. Patient will report performance of HEP at least 2 times per day to facilitate improved outcomes and improved self management. Pt reports compliance 10/1/2019     Long Term Goals: To be accomplished in 8 weeks:              1. Pt will demonstrate (-) dynamic visual acuity test to indicate improve vestibular function and reduce instability. Met, loss of 1 line on snellen chart 10/22/2019              2. Pt will demonstrate 24 DGI score to indicate improved gait stability. 24/24 10/22/2019              3. Pt will demonstrate ability to perform tandem balance for 30 seconds void of > 1 balance error to indicate improved static balance.  Met, able to perform with 1 balance error total in 2 sets of 30 seconds 10/22/2019    ASSESSMENT/RECOMMENDATIONS: Pt demonstrates improve VOR function as evidenced by improved performance on dynamic visual acuity test, improved gait stability with DGI of 24/24, and improved static balance with narrow ESPERANZA, on compliant surfaces, and with high level balance interventions. She reports no significant change via her FOTO score decline of 1 point, which is at odds with her objective improvement as well as subjective reports.  At this time, pt has been advised to continue her HEP 2 days/week for maintenance and will be DC from skilled PT.    [x]Discontinue therapy: [x]Patient has reached or is progressing toward set goals      []Patient is non-compliant or has abdicated      []Due to lack of appreciable progress towards set goals    Qiana Hernández, PT, DPT, ATC 12/20/2019 2:16 PM

## 2019-12-30 DIAGNOSIS — D80.1 HYPOGAMMAGLOBULINEMIA (HCC): ICD-10-CM

## 2020-01-02 ENCOUNTER — HOSPITAL ENCOUNTER (OUTPATIENT)
Dept: INFUSION THERAPY | Age: 70
Discharge: HOME | End: 2020-01-02
Payer: MEDICARE

## 2020-01-02 VITALS
HEART RATE: 78 BPM | TEMPERATURE: 97.8 F | DIASTOLIC BLOOD PRESSURE: 77 MMHG | RESPIRATION RATE: 18 BRPM | SYSTOLIC BLOOD PRESSURE: 124 MMHG | OXYGEN SATURATION: 99 %

## 2020-01-02 DIAGNOSIS — D80.1 HYPOGAMMAGLOBULINEMIA (HCC): Primary | ICD-10-CM

## 2020-01-02 PROCEDURE — 96366 THER/PROPH/DIAG IV INF ADDON: CPT

## 2020-01-02 PROCEDURE — 96365 THER/PROPH/DIAG IV INF INIT: CPT

## 2020-01-02 PROCEDURE — 74011250636 HC RX REV CODE- 250/636: Performed by: ALLERGY & IMMUNOLOGY

## 2020-01-02 PROCEDURE — 74011250637 HC RX REV CODE- 250/637: Performed by: ALLERGY & IMMUNOLOGY

## 2020-01-02 RX ORDER — HYDROCORTISONE SODIUM SUCCINATE 100 MG/2ML
100 INJECTION, POWDER, FOR SOLUTION INTRAMUSCULAR; INTRAVENOUS AS NEEDED
Status: CANCELLED | OUTPATIENT
Start: 2020-01-27

## 2020-01-02 RX ORDER — DIPHENHYDRAMINE HYDROCHLORIDE 50 MG/ML
50 INJECTION, SOLUTION INTRAMUSCULAR; INTRAVENOUS AS NEEDED
Status: CANCELLED
Start: 2020-01-27

## 2020-01-02 RX ORDER — HEPARIN 100 UNIT/ML
300-500 SYRINGE INTRAVENOUS AS NEEDED
Status: CANCELLED
Start: 2020-01-27

## 2020-01-02 RX ORDER — ACETAMINOPHEN 325 MG/1
650 TABLET ORAL ONCE
Status: COMPLETED | OUTPATIENT
Start: 2020-01-02 | End: 2020-01-02

## 2020-01-02 RX ORDER — DIPHENHYDRAMINE HCL 25 MG
25 CAPSULE ORAL ONCE
Status: ACTIVE | OUTPATIENT
Start: 2020-01-02 | End: 2020-01-02

## 2020-01-02 RX ORDER — SODIUM CHLORIDE 9 MG/ML
25 INJECTION, SOLUTION INTRAVENOUS CONTINUOUS
Status: CANCELLED | OUTPATIENT
Start: 2020-01-27

## 2020-01-02 RX ORDER — ACETAMINOPHEN 325 MG/1
650 TABLET ORAL ONCE
Status: CANCELLED
Start: 2020-01-27

## 2020-01-02 RX ORDER — EPINEPHRINE 1 MG/ML
0.3 INJECTION, SOLUTION, CONCENTRATE INTRAVENOUS AS NEEDED
Status: CANCELLED | OUTPATIENT
Start: 2020-01-27

## 2020-01-02 RX ORDER — ONDANSETRON 2 MG/ML
8 INJECTION INTRAMUSCULAR; INTRAVENOUS AS NEEDED
Status: CANCELLED | OUTPATIENT
Start: 2020-01-27

## 2020-01-02 RX ORDER — ACETAMINOPHEN 325 MG/1
650 TABLET ORAL AS NEEDED
Status: CANCELLED
Start: 2020-01-27

## 2020-01-02 RX ORDER — SODIUM CHLORIDE 0.9 % (FLUSH) 0.9 %
10-40 SYRINGE (ML) INJECTION AS NEEDED
Status: CANCELLED
Start: 2020-01-27

## 2020-01-02 RX ORDER — ALBUTEROL SULFATE 0.83 MG/ML
2.5 SOLUTION RESPIRATORY (INHALATION) AS NEEDED
Status: CANCELLED
Start: 2020-01-27

## 2020-01-02 RX ORDER — DIPHENHYDRAMINE HCL 25 MG
25 CAPSULE ORAL ONCE
Status: CANCELLED
Start: 2020-01-27

## 2020-01-02 RX ORDER — SODIUM CHLORIDE 9 MG/ML
25 INJECTION, SOLUTION INTRAVENOUS CONTINUOUS
Status: DISPENSED | OUTPATIENT
Start: 2020-01-02 | End: 2020-01-02

## 2020-01-02 RX ADMIN — SODIUM CHLORIDE 25 ML/HR: 9 INJECTION, SOLUTION INTRAVENOUS at 08:35

## 2020-01-02 RX ADMIN — IMMUNE GLOBULIN INTRAVENOUS (HUMAN) 10 G: 5 INJECTION, SOLUTION INTRAVENOUS at 10:22

## 2020-01-02 RX ADMIN — IMMUNE GLOBULIN INTRAVENOUS (HUMAN) 20 G: 5 INJECTION, SOLUTION INTRAVENOUS at 08:35

## 2020-01-02 RX ADMIN — ACETAMINOPHEN 650 MG: 325 TABLET ORAL at 08:25

## 2020-01-02 NOTE — PROGRESS NOTES
[facility] Progress Note    Date: 2020    Name: Kayden Coy    MRN: [id number]         : 1950      Ms. Erica Acuna arrived to [facility] at 0478. Pt is here for Q 4 Week IVIG Infusion. Ms. Erica Acuna was assessed and education was provided. Ms. Denney Em vitals were reviewed. Visit Vitals  /79 (BP 1 Location: Left arm, BP Patient Position: Sitting)   Pulse 83   Temp 97.6 °F (36.4 °C)   Resp 18   SpO2 99%   Breastfeeding No       Tylenol 650mg administered as ordered for pre-medication. Pt refused Benadryl.    22g IV inserted in patient's right AC x1 attempt. Brisk blood return/ flushes without difficulty. IVIG 30 grams (Flebogamma) administered as ordered with the following rate titrations: 33 ml/hr x 30 minutes, 66 ml/hr x 30 minutes, 132 ml/hr x 30 minutes, and 264 ml/hr until completion. Pt's VS remained stable throughout infusion and pt denied complaints. Patient Vitals for the past 12 hrs:   Temp Pulse Resp BP SpO2   20 1048 97.8 °F (36.6 °C) 78 18 124/77 --   20 1005 98 °F (36.7 °C) 84 18 118/75 --   20 0935 97.7 °F (36.5 °C) 80 18 123/79 --   20 0905 97.7 °F (36.5 °C) 74 18 131/81 --   20 0835 97.6 °F (36.4 °C) 78 18 135/80 --   20 0821 97.6 °F (36.4 °C) 83 18 138/79 99 %       Pt's IV flushed w/ NS. Removed/ intact. Gauze/ coban to site. Pt's armband removed & shredded. Ms. Erica Acuna was discharged from [place] [address] in stable condition at 1055. She is to return on 20 at 0800 for her next appointment.      Desiree Davis  2020

## 2020-01-27 DIAGNOSIS — D80.1 HYPOGAMMAGLOBULINEMIA (HCC): ICD-10-CM

## 2020-01-30 ENCOUNTER — HOSPITAL ENCOUNTER (OUTPATIENT)
Dept: INFUSION THERAPY | Age: 70
End: 2020-01-30
Payer: MEDICARE

## 2020-02-04 ENCOUNTER — HOSPITAL ENCOUNTER (OUTPATIENT)
Dept: INFUSION THERAPY | Age: 70
Discharge: HOME | End: 2020-02-04
Payer: MEDICARE

## 2020-02-04 VITALS
TEMPERATURE: 98.2 F | OXYGEN SATURATION: 99 % | RESPIRATION RATE: 18 BRPM | DIASTOLIC BLOOD PRESSURE: 78 MMHG | HEART RATE: 73 BPM | SYSTOLIC BLOOD PRESSURE: 134 MMHG

## 2020-02-04 DIAGNOSIS — D80.1 HYPOGAMMAGLOBULINEMIA (HCC): Primary | ICD-10-CM

## 2020-02-04 PROCEDURE — 96366 THER/PROPH/DIAG IV INF ADDON: CPT

## 2020-02-04 PROCEDURE — 74011250637 HC RX REV CODE- 250/637: Performed by: ALLERGY & IMMUNOLOGY

## 2020-02-04 PROCEDURE — 74011250636 HC RX REV CODE- 250/636: Performed by: ALLERGY & IMMUNOLOGY

## 2020-02-04 PROCEDURE — 96365 THER/PROPH/DIAG IV INF INIT: CPT

## 2020-02-04 RX ORDER — DIPHENHYDRAMINE HYDROCHLORIDE 50 MG/ML
50 INJECTION, SOLUTION INTRAMUSCULAR; INTRAVENOUS AS NEEDED
Status: CANCELLED
Start: 2020-02-24

## 2020-02-04 RX ORDER — ACETAMINOPHEN 325 MG/1
650 TABLET ORAL ONCE
Status: CANCELLED
Start: 2020-02-24

## 2020-02-04 RX ORDER — ONDANSETRON 2 MG/ML
8 INJECTION INTRAMUSCULAR; INTRAVENOUS AS NEEDED
Status: CANCELLED | OUTPATIENT
Start: 2020-02-24

## 2020-02-04 RX ORDER — ACETAMINOPHEN 325 MG/1
650 TABLET ORAL ONCE
Status: COMPLETED | OUTPATIENT
Start: 2020-02-04 | End: 2020-02-04

## 2020-02-04 RX ORDER — SODIUM CHLORIDE 9 MG/ML
25 INJECTION, SOLUTION INTRAVENOUS CONTINUOUS
Status: DISPENSED | OUTPATIENT
Start: 2020-02-04 | End: 2020-02-04

## 2020-02-04 RX ORDER — SODIUM CHLORIDE 9 MG/ML
25 INJECTION, SOLUTION INTRAVENOUS CONTINUOUS
Status: CANCELLED | OUTPATIENT
Start: 2020-02-24

## 2020-02-04 RX ORDER — HYDROCORTISONE SODIUM SUCCINATE 100 MG/2ML
100 INJECTION, POWDER, FOR SOLUTION INTRAMUSCULAR; INTRAVENOUS AS NEEDED
Status: CANCELLED | OUTPATIENT
Start: 2020-02-24

## 2020-02-04 RX ORDER — ACETAMINOPHEN 325 MG/1
650 TABLET ORAL AS NEEDED
Status: CANCELLED
Start: 2020-02-24

## 2020-02-04 RX ORDER — DIPHENHYDRAMINE HCL 25 MG
25 CAPSULE ORAL ONCE
Status: ACTIVE | OUTPATIENT
Start: 2020-02-04 | End: 2020-02-04

## 2020-02-04 RX ORDER — HEPARIN 100 UNIT/ML
300-500 SYRINGE INTRAVENOUS AS NEEDED
Status: CANCELLED
Start: 2020-02-24

## 2020-02-04 RX ORDER — EPINEPHRINE 1 MG/ML
0.3 INJECTION, SOLUTION, CONCENTRATE INTRAVENOUS AS NEEDED
Status: CANCELLED | OUTPATIENT
Start: 2020-02-24

## 2020-02-04 RX ORDER — SODIUM CHLORIDE 0.9 % (FLUSH) 0.9 %
10-40 SYRINGE (ML) INJECTION AS NEEDED
Status: CANCELLED
Start: 2020-02-24

## 2020-02-04 RX ORDER — ALBUTEROL SULFATE 0.83 MG/ML
2.5 SOLUTION RESPIRATORY (INHALATION) AS NEEDED
Status: CANCELLED
Start: 2020-02-24

## 2020-02-04 RX ORDER — SODIUM CHLORIDE 0.9 % (FLUSH) 0.9 %
10-40 SYRINGE (ML) INJECTION AS NEEDED
Status: DISPENSED | OUTPATIENT
Start: 2020-02-04 | End: 2020-02-04

## 2020-02-04 RX ORDER — DIPHENHYDRAMINE HCL 25 MG
25 CAPSULE ORAL ONCE
Status: CANCELLED
Start: 2020-02-24

## 2020-02-04 RX ADMIN — IMMUNE GLOBULIN INTRAVENOUS (HUMAN) 10 G: 5 INJECTION, SOLUTION INTRAVENOUS at 10:35

## 2020-02-04 RX ADMIN — ACETAMINOPHEN 650 MG: 325 TABLET ORAL at 08:15

## 2020-02-04 RX ADMIN — SODIUM CHLORIDE 25 ML/HR: 9 INJECTION, SOLUTION INTRAVENOUS at 08:41

## 2020-02-04 RX ADMIN — IMMUNE GLOBULIN INTRAVENOUS (HUMAN) 20 G: 5 INJECTION, SOLUTION INTRAVENOUS at 08:40

## 2020-02-04 NOTE — PROGRESS NOTES
[facility] Progress Note    Date: 2020    Name: Gus Santacruz    MRN: [id number]         : 1950      Ms. Titi Gee arrived in the [facility] today, at [address] [employer], in stable condition, here for Q 4 Week,  IVIG Infusion. She was assessed and education was provided. Ms. Barrera Smith vitals were reviewed. Patient Vitals for the past 12 hrs:   Temp Pulse Resp BP SpO2   20 1111 98.2 °F (36.8 °C) 73 18 134/78 99 %   20 1020 98 °F (36.7 °C) 78 18 146/87 100 %   20 0950 97.7 °F (36.5 °C) 76 18 125/78 100 %   20 0920 97.6 °F (36.4 °C) 77 18 128/80 99 %   20 0805 97.6 °F (36.4 °C) 74 18 124/76 98 %         PIV # 25 G was established in her left AC  without incident. Pre-medication consisting of PO Tylenol 650 mg was administered per order, and without incident. Ms. Diana Sanchez refused the PO Benadryl, because she stated that it just made her too drowsy. She did not want to wait 30 mins prior to starting her infusion. IVIG 30 grams (Immunoglobulin 20 %) (Flebogamma) , was administered IV, per order, and without incident. The IVIG was infused with the following rate titration: 33 ml/hr X 30 minutes, 66 ml/hr X 30 minutes, 132 ml/hr X 30 minutes, & 264 ml/hr until completion. After completion of the IVIG, the PIV was flushed very well per protocol, and then, the PIV was removed and gauze/bandaid was applied. Ms. Titi Gee tolerated well, and had no complaints. Ms. Titi Gee was discharged from [place] [address] in stable condition at 1115. She is to return in 4 weeks, on 3-3-20 at 0800,  for her next appointment, for her next IVIG Infusion.      Gavin Bautista RN  2020

## 2020-02-04 NOTE — PROGRESS NOTES
Problem: Knowledge Deficit  Goal: *Verbalizes understanding of procedures and medications  2/4/2020 1022 by Juan Conner RN  Outcome: Progressing Towards Goal  2/4/2020 0856 by Juan Conner RN  Outcome: Progressing Towards Goal     Problem: Patient Education:  Go to Education Activity  Goal: Patient/Family Education  2/4/2020 1022 by Juan Conner RN  Outcome: Progressing Towards Goal  2/4/2020 0856 by Juan Conner RN  Outcome: Progressing Towards Goal

## 2020-02-24 DIAGNOSIS — D80.1 HYPOGAMMAGLOBULINEMIA (HCC): ICD-10-CM

## 2020-02-27 ENCOUNTER — APPOINTMENT (OUTPATIENT)
Dept: INFUSION THERAPY | Age: 70
End: 2020-02-27
Payer: MEDICARE

## 2020-03-03 ENCOUNTER — HOSPITAL ENCOUNTER (OUTPATIENT)
Dept: INFUSION THERAPY | Age: 70
Discharge: HOME | End: 2020-03-03
Payer: MEDICARE

## 2020-03-03 VITALS
OXYGEN SATURATION: 95 % | SYSTOLIC BLOOD PRESSURE: 122 MMHG | TEMPERATURE: 98.1 F | DIASTOLIC BLOOD PRESSURE: 74 MMHG | RESPIRATION RATE: 18 BRPM | HEART RATE: 88 BPM

## 2020-03-03 DIAGNOSIS — D80.1 HYPOGAMMAGLOBULINEMIA (HCC): Primary | ICD-10-CM

## 2020-03-03 LAB
BASO+EOS+MONOS # BLD AUTO: 0.8 K/UL (ref 0–2.3)
BASO+EOS+MONOS NFR BLD AUTO: 12 % (ref 0.1–17)
DIFFERENTIAL METHOD BLD: NORMAL
ERYTHROCYTE [DISTWIDTH] IN BLOOD BY AUTOMATED COUNT: 13.5 % (ref 11.5–14.5)
HCT VFR BLD AUTO: 38.7 % (ref 36–48)
HGB BLD-MCNC: 12.9 G/DL (ref 12–16)
LYMPHOCYTES # BLD: 2.5 K/UL (ref 1.1–5.9)
LYMPHOCYTES NFR BLD: 40 % (ref 14–44)
MCH RBC QN AUTO: 30.9 PG (ref 25–35)
MCHC RBC AUTO-ENTMCNC: 33.3 G/DL (ref 31–37)
MCV RBC AUTO: 92.6 FL (ref 78–102)
NEUTS SEG # BLD: 3 K/UL (ref 1.8–9.5)
NEUTS SEG NFR BLD: 48 % (ref 40–70)
PLATELET # BLD AUTO: 291 K/UL (ref 140–440)
RBC # BLD AUTO: 4.18 M/UL (ref 4.1–5.1)
WBC # BLD AUTO: 6.3 K/UL (ref 4.5–13)

## 2020-03-03 PROCEDURE — 74011250637 HC RX REV CODE- 250/637: Performed by: ALLERGY & IMMUNOLOGY

## 2020-03-03 PROCEDURE — 74011250636 HC RX REV CODE- 250/636: Performed by: ALLERGY & IMMUNOLOGY

## 2020-03-03 PROCEDURE — 96366 THER/PROPH/DIAG IV INF ADDON: CPT

## 2020-03-03 PROCEDURE — 96365 THER/PROPH/DIAG IV INF INIT: CPT

## 2020-03-03 PROCEDURE — 85025 COMPLETE CBC W/AUTO DIFF WBC: CPT

## 2020-03-03 RX ORDER — SODIUM CHLORIDE 9 MG/ML
25 INJECTION, SOLUTION INTRAVENOUS CONTINUOUS
Status: CANCELLED | OUTPATIENT
Start: 2020-03-03

## 2020-03-03 RX ORDER — DIPHENHYDRAMINE HYDROCHLORIDE 50 MG/ML
50 INJECTION, SOLUTION INTRAMUSCULAR; INTRAVENOUS AS NEEDED
Status: CANCELLED
Start: 2020-03-03

## 2020-03-03 RX ORDER — DIPHENHYDRAMINE HCL 25 MG
25 CAPSULE ORAL ONCE
Status: DISPENSED | OUTPATIENT
Start: 2020-03-03 | End: 2020-03-03

## 2020-03-03 RX ORDER — ALBUTEROL SULFATE 0.83 MG/ML
2.5 SOLUTION RESPIRATORY (INHALATION) AS NEEDED
Status: CANCELLED
Start: 2020-03-03

## 2020-03-03 RX ORDER — EPINEPHRINE 1 MG/ML
0.3 INJECTION, SOLUTION, CONCENTRATE INTRAVENOUS AS NEEDED
Status: CANCELLED | OUTPATIENT
Start: 2020-03-03

## 2020-03-03 RX ORDER — ACETAMINOPHEN 325 MG/1
650 TABLET ORAL AS NEEDED
Status: CANCELLED
Start: 2020-03-03

## 2020-03-03 RX ORDER — ACETAMINOPHEN 325 MG/1
650 TABLET ORAL ONCE
Status: COMPLETED | OUTPATIENT
Start: 2020-03-03 | End: 2020-03-03

## 2020-03-03 RX ORDER — HEPARIN 100 UNIT/ML
300-500 SYRINGE INTRAVENOUS AS NEEDED
Status: CANCELLED
Start: 2020-03-03

## 2020-03-03 RX ORDER — HYDROCORTISONE SODIUM SUCCINATE 100 MG/2ML
100 INJECTION, POWDER, FOR SOLUTION INTRAMUSCULAR; INTRAVENOUS AS NEEDED
Status: CANCELLED | OUTPATIENT
Start: 2020-03-03

## 2020-03-03 RX ORDER — DIPHENHYDRAMINE HCL 25 MG
25 CAPSULE ORAL ONCE
Status: CANCELLED
Start: 2020-03-03

## 2020-03-03 RX ORDER — ONDANSETRON 2 MG/ML
8 INJECTION INTRAMUSCULAR; INTRAVENOUS AS NEEDED
Status: CANCELLED | OUTPATIENT
Start: 2020-03-03

## 2020-03-03 RX ORDER — SODIUM CHLORIDE 0.9 % (FLUSH) 0.9 %
10-40 SYRINGE (ML) INJECTION AS NEEDED
Status: CANCELLED
Start: 2020-03-03

## 2020-03-03 RX ORDER — SODIUM CHLORIDE 0.9 % (FLUSH) 0.9 %
10-40 SYRINGE (ML) INJECTION AS NEEDED
Status: DISPENSED | OUTPATIENT
Start: 2020-03-03 | End: 2020-03-03

## 2020-03-03 RX ORDER — ACETAMINOPHEN 325 MG/1
650 TABLET ORAL ONCE
Status: CANCELLED
Start: 2020-03-03

## 2020-03-03 RX ADMIN — IMMUNE GLOBULIN INTRAVENOUS (HUMAN) 10 G: 5 INJECTION, SOLUTION INTRAVENOUS at 11:06

## 2020-03-03 RX ADMIN — IMMUNE GLOBULIN INTRAVENOUS (HUMAN) 20 G: 5 INJECTION, SOLUTION INTRAVENOUS at 08:54

## 2020-03-03 RX ADMIN — ACETAMINOPHEN 650 MG: 325 TABLET ORAL at 08:40

## 2020-03-03 NOTE — PROGRESS NOTES
[facility] Progress Note    Date: March 3, 2020    Name: Maximilian Fernandez    MRN: [id number]         : 1950      Ms. Kade Welsh arrived in the [facility] today, at 0815, in stable condition, here for Q 4 Week,  IVIG Infusion. She was assessed and education was provided. Ms. Milli Hare vitals were reviewed. Patient Vitals for the past 12 hrs:   Temp Pulse Resp BP SpO2   20 1135 98.1 °F (36.7 °C) 88 18 122/74 95 %   20 1124 98.1 °F (36.7 °C) 80 18 128/78 95 %   20 1054 98.2 °F (36.8 °C) 79 18 119/72 98 %   20 1024 98 °F (36.7 °C) 82 18 137/80 97 %   20 0954 98.4 °F (36.9 °C) 80 18 139/84 100 %   20 0924 97.9 °F (36.6 °C) 80 18 133/79 100 %   20 0904 98 °F (36.7 °C) 76 18 133/76 98 %   20 0815 97.7 °F (36.5 °C) 78 18 162/75 99 %       PIV # 25 G was established in her rightt AC x1 attempt without incident. Cbc also drawn from site per pt's request prior to infusion. Pre-medication consisting of PO Tylenol 650 mg was administered per order, and without incident. Ms. Jayda Knight refused the PO Benadryl, because she stated that it just made her too drowsy. IVIG 30 grams (Immunoglobulin 20 %) (Flebogamma) , was administered IV, per order, and without incident. The IVIG was infused with the following rate titration: 33 ml/hr X 30 minutes, 66 ml/hr X 30 minutes, 132 ml/hr X 30 minutes,165 ml/hr x 30 minutes and 198 ml/hr until completion. After completion of the IVIG, the PIV was flushed very well per protocol, and then, the PIV was removed and gauze/bandaid was applied. Ms. Kade Welsh tolerated well, and had no complaints. Ms. Kade Welsh was discharged from [place] [address] in stable condition at 1145. She is to return in 4 weeks, on 3-31-20 at 0800,  for her next appointment, for her next IVIG Infusion.          Shobha Newell  March 3, 2020

## 2020-03-31 ENCOUNTER — HOSPITAL ENCOUNTER (OUTPATIENT)
Dept: INFUSION THERAPY | Age: 70
Discharge: HOME | End: 2020-03-31
Payer: MEDICARE

## 2020-03-31 VITALS
TEMPERATURE: 98.2 F | HEART RATE: 75 BPM | OXYGEN SATURATION: 99 % | SYSTOLIC BLOOD PRESSURE: 119 MMHG | RESPIRATION RATE: 18 BRPM | DIASTOLIC BLOOD PRESSURE: 76 MMHG

## 2020-03-31 DIAGNOSIS — D80.1 HYPOGAMMAGLOBULINEMIA (HCC): Primary | ICD-10-CM

## 2020-03-31 PROCEDURE — 96365 THER/PROPH/DIAG IV INF INIT: CPT

## 2020-03-31 PROCEDURE — 74011250636 HC RX REV CODE- 250/636: Performed by: ALLERGY & IMMUNOLOGY

## 2020-03-31 PROCEDURE — 74011250637 HC RX REV CODE- 250/637: Performed by: ALLERGY & IMMUNOLOGY

## 2020-03-31 PROCEDURE — 96366 THER/PROPH/DIAG IV INF ADDON: CPT

## 2020-03-31 RX ORDER — ACETAMINOPHEN 325 MG/1
650 TABLET ORAL ONCE
Status: CANCELLED
Start: 2020-03-31

## 2020-03-31 RX ORDER — ALBUTEROL SULFATE 0.83 MG/ML
2.5 SOLUTION RESPIRATORY (INHALATION) AS NEEDED
Status: CANCELLED
Start: 2020-03-31

## 2020-03-31 RX ORDER — SODIUM CHLORIDE 9 MG/ML
25 INJECTION, SOLUTION INTRAVENOUS CONTINUOUS
Status: CANCELLED | OUTPATIENT
Start: 2020-03-31

## 2020-03-31 RX ORDER — ONDANSETRON 2 MG/ML
8 INJECTION INTRAMUSCULAR; INTRAVENOUS AS NEEDED
Status: CANCELLED | OUTPATIENT
Start: 2020-03-31

## 2020-03-31 RX ORDER — HYDROCORTISONE SODIUM SUCCINATE 100 MG/2ML
100 INJECTION, POWDER, FOR SOLUTION INTRAMUSCULAR; INTRAVENOUS AS NEEDED
Status: CANCELLED | OUTPATIENT
Start: 2020-03-31

## 2020-03-31 RX ORDER — DIPHENHYDRAMINE HYDROCHLORIDE 50 MG/ML
50 INJECTION, SOLUTION INTRAMUSCULAR; INTRAVENOUS AS NEEDED
Status: CANCELLED
Start: 2020-03-31

## 2020-03-31 RX ORDER — ACETAMINOPHEN 325 MG/1
650 TABLET ORAL ONCE
Status: COMPLETED | OUTPATIENT
Start: 2020-03-31 | End: 2020-03-31

## 2020-03-31 RX ORDER — SODIUM CHLORIDE 0.9 % (FLUSH) 0.9 %
10-40 SYRINGE (ML) INJECTION AS NEEDED
Status: CANCELLED
Start: 2020-03-31

## 2020-03-31 RX ORDER — HEPARIN 100 UNIT/ML
300-500 SYRINGE INTRAVENOUS AS NEEDED
Status: CANCELLED
Start: 2020-03-31

## 2020-03-31 RX ORDER — DIPHENHYDRAMINE HCL 25 MG
25 CAPSULE ORAL ONCE
Status: CANCELLED
Start: 2020-03-31

## 2020-03-31 RX ORDER — EPINEPHRINE 1 MG/ML
0.3 INJECTION, SOLUTION, CONCENTRATE INTRAVENOUS AS NEEDED
Status: CANCELLED | OUTPATIENT
Start: 2020-03-31

## 2020-03-31 RX ORDER — SODIUM CHLORIDE 9 MG/ML
25 INJECTION, SOLUTION INTRAVENOUS CONTINUOUS
Status: DISPENSED | OUTPATIENT
Start: 2020-03-31 | End: 2020-03-31

## 2020-03-31 RX ORDER — ACETAMINOPHEN 325 MG/1
650 TABLET ORAL AS NEEDED
Status: CANCELLED
Start: 2020-03-31

## 2020-03-31 RX ORDER — DIPHENHYDRAMINE HCL 25 MG
25 CAPSULE ORAL ONCE
Status: DISCONTINUED | OUTPATIENT
Start: 2020-03-31 | End: 2020-03-31

## 2020-03-31 RX ADMIN — IMMUNE GLOBULIN INTRAVENOUS (HUMAN) 10 G: 5 INJECTION, SOLUTION INTRAVENOUS at 10:35

## 2020-03-31 RX ADMIN — SODIUM CHLORIDE 25 ML/HR: 9 INJECTION, SOLUTION INTRAVENOUS at 08:39

## 2020-03-31 RX ADMIN — IMMUNE GLOBULIN INTRAVENOUS (HUMAN) 20 G: 5 INJECTION, SOLUTION INTRAVENOUS at 08:40

## 2020-03-31 RX ADMIN — ACETAMINOPHEN 650 MG: 325 TABLET ORAL at 08:30

## 2020-03-31 NOTE — PROGRESS NOTES
[facility] Progress Note    Date: 2020    Name: Jessica Henry    MRN: [id number]         : 1950      Ms. Vero Gilbert arrived to [facility] at 2705. Pt is here for Q 4 Week IVIG Infusion. Ms. Vero Gilbert was assessed and education was provided. Ms. Jimenez All vitals were reviewed. Visit Vitals  /75 (BP 1 Location: Left arm, BP Patient Position: Sitting)   Pulse 82   Temp 98 °F (36.7 °C)   Resp 18   SpO2 99%       Tylenol 650mg administered as ordered for pre-medication. 22g IV inserted in patient's Left AC x1 attempt. Brisk blood return/ flushes without difficulty. IVIG 30 grams (Flebogamma) administered as ordered with the following rate titrations: 33 ml/hr x 30 minutes, 66 ml/hr x 30 minutes, 132 ml/hr x 30 minutes, and 264 ml/hr until completion. Pt's VS remained stable throughout infusion and pt denied complaints. Patient Vitals for the past 12 hrs:   Temp Pulse Resp BP SpO2   20 1040 98.2 °F (36.8 °C) 75 18 119/76 --   20 1010 98 °F (36.7 °C) 76 18 123/77 --   20 0940 98 °F (36.7 °C) 76 18 136/81 --   20 0910 98 °F (36.7 °C) 77 18 132/71 --   20 0840 98.2 °F (36.8 °C) 72 18 131/72 --   20 0823 98 °F (36.7 °C) 82 18 135/75 99 %       Pt's IV flushed w/ NS. Removed/ intact. Gauze/ coban to site. Pt's armband removed & shredded. Ms. Vero Gilbert was discharged from [place] [address] in stable condition at . She is to return on 2020 at 0800 for her next appointment.      Deidra Singh  2020

## 2020-04-29 DIAGNOSIS — D80.1 HYPOGAMMAGLOBULINEMIA (HCC): ICD-10-CM

## 2020-04-30 ENCOUNTER — HOSPITAL ENCOUNTER (OUTPATIENT)
Dept: INFUSION THERAPY | Age: 70
Discharge: HOME | End: 2020-04-30
Payer: MEDICARE

## 2020-04-30 VITALS
DIASTOLIC BLOOD PRESSURE: 67 MMHG | OXYGEN SATURATION: 100 % | TEMPERATURE: 98 F | RESPIRATION RATE: 18 BRPM | SYSTOLIC BLOOD PRESSURE: 127 MMHG | HEART RATE: 76 BPM

## 2020-04-30 DIAGNOSIS — D80.1 HYPOGAMMAGLOBULINEMIA (HCC): Primary | ICD-10-CM

## 2020-04-30 PROCEDURE — 74011250636 HC RX REV CODE- 250/636: Performed by: ALLERGY & IMMUNOLOGY

## 2020-04-30 PROCEDURE — 96365 THER/PROPH/DIAG IV INF INIT: CPT

## 2020-04-30 PROCEDURE — 96366 THER/PROPH/DIAG IV INF ADDON: CPT

## 2020-04-30 PROCEDURE — 74011250637 HC RX REV CODE- 250/637: Performed by: ALLERGY & IMMUNOLOGY

## 2020-04-30 RX ORDER — ALBUTEROL SULFATE 0.83 MG/ML
2.5 SOLUTION RESPIRATORY (INHALATION) AS NEEDED
Status: CANCELLED
Start: 2020-05-21

## 2020-04-30 RX ORDER — ONDANSETRON 2 MG/ML
8 INJECTION INTRAMUSCULAR; INTRAVENOUS AS NEEDED
Status: CANCELLED | OUTPATIENT
Start: 2020-05-21

## 2020-04-30 RX ORDER — DIPHENHYDRAMINE HCL 25 MG
25 CAPSULE ORAL ONCE
Status: CANCELLED
Start: 2020-05-21

## 2020-04-30 RX ORDER — DIPHENHYDRAMINE HYDROCHLORIDE 50 MG/ML
25 INJECTION, SOLUTION INTRAMUSCULAR; INTRAVENOUS AS NEEDED
Status: CANCELLED
Start: 2020-05-21

## 2020-04-30 RX ORDER — ACETAMINOPHEN 325 MG/1
650 TABLET ORAL ONCE
Status: COMPLETED | OUTPATIENT
Start: 2020-04-30 | End: 2020-04-30

## 2020-04-30 RX ORDER — SODIUM CHLORIDE 0.9 % (FLUSH) 0.9 %
10 SYRINGE (ML) INJECTION AS NEEDED
Status: CANCELLED
Start: 2020-05-21

## 2020-04-30 RX ORDER — ACETAMINOPHEN 325 MG/1
650 TABLET ORAL ONCE
Status: CANCELLED
Start: 2020-05-21

## 2020-04-30 RX ORDER — EPINEPHRINE 1 MG/ML
0.3 INJECTION, SOLUTION, CONCENTRATE INTRAVENOUS AS NEEDED
Status: CANCELLED | OUTPATIENT
Start: 2020-05-21

## 2020-04-30 RX ORDER — HEPARIN 100 UNIT/ML
300-500 SYRINGE INTRAVENOUS AS NEEDED
Status: CANCELLED
Start: 2020-05-21

## 2020-04-30 RX ORDER — DIPHENHYDRAMINE HYDROCHLORIDE 50 MG/ML
50 INJECTION, SOLUTION INTRAMUSCULAR; INTRAVENOUS AS NEEDED
Status: CANCELLED
Start: 2020-05-21

## 2020-04-30 RX ORDER — SODIUM CHLORIDE 9 MG/ML
25 INJECTION, SOLUTION INTRAVENOUS CONTINUOUS
Status: DISPENSED | OUTPATIENT
Start: 2020-04-30 | End: 2020-04-30

## 2020-04-30 RX ORDER — DIPHENHYDRAMINE HCL 25 MG
25 CAPSULE ORAL ONCE
Status: DISPENSED | OUTPATIENT
Start: 2020-04-30 | End: 2020-04-30

## 2020-04-30 RX ORDER — HYDROCORTISONE SODIUM SUCCINATE 100 MG/2ML
100 INJECTION, POWDER, FOR SOLUTION INTRAMUSCULAR; INTRAVENOUS AS NEEDED
Status: CANCELLED | OUTPATIENT
Start: 2020-05-21

## 2020-04-30 RX ORDER — SODIUM CHLORIDE 9 MG/ML
25 INJECTION, SOLUTION INTRAVENOUS CONTINUOUS
Status: CANCELLED | OUTPATIENT
Start: 2020-05-21

## 2020-04-30 RX ORDER — ACETAMINOPHEN 325 MG/1
650 TABLET ORAL AS NEEDED
Status: CANCELLED
Start: 2020-05-21

## 2020-04-30 RX ORDER — SODIUM CHLORIDE 9 MG/ML
10 INJECTION INTRAMUSCULAR; INTRAVENOUS; SUBCUTANEOUS AS NEEDED
Status: CANCELLED | OUTPATIENT
Start: 2020-05-21

## 2020-04-30 RX ADMIN — IMMUNE GLOBULIN INTRAVENOUS (HUMAN) 10 G: 5 INJECTION, SOLUTION INTRAVENOUS at 08:49

## 2020-04-30 RX ADMIN — IMMUNE GLOBULIN INTRAVENOUS (HUMAN) 20 G: 5 INJECTION, SOLUTION INTRAVENOUS at 09:57

## 2020-04-30 RX ADMIN — ACETAMINOPHEN 650 MG: 325 TABLET ORAL at 08:14

## 2020-04-30 NOTE — PROGRESS NOTES
[facility] Progress Note    Date: 2020    Name: Mandie Parnell    MRN: [id number]         : 1950      Ms. Ezekiel Olivera arrived to [facility] at 0597. Pt is here for Q 4 Week IVIG Infusion. Ms. Ezekiel Olivera was assessed and education was provided. Ms. Rozina Mao vitals were reviewed. Visit Vitals  /67   Pulse 76   Temp 98 °F (36.7 °C)   Resp 18   SpO2 100%   Breastfeeding No       Tylenol 650mg administered as ordered for pre-medication. 22g IV inserted in patient's right AC x1 attempt. Brisk blood return/ flushes without difficulty. IVIG 30 grams (Flebogamma) administered as ordered with the following rate titrations: 33 ml/hr x 30 minutes, 66 ml/hr x 30 minutes, 132 ml/hr x 30 minutes, and 264 ml/hr until completion. Pt's VS remained stable throughout infusion and pt denied complaints. Patient Vitals for the past 12 hrs:   Temp Pulse Resp BP SpO2   20 1103 98 °F (36.7 °C) 76 18 127/67 100 %   20 1015 97.6 °F (36.4 °C) 70 16 133/70 100 %   20 0938 97.5 °F (36.4 °C) 75 16 132/79 99 %   20 0910 98 °F (36.7 °C) 72 16 132/72 99 %   20 0847 -- 68 16 127/73 99 %   20 0811 97.9 °F (36.6 °C) 77 16 136/73 100 %       Pt's IV flushed w/ NS. Removed/ intact. Gauze/ coban to site. Pt's armband removed & shredded. Ms. Ezekiel Olivera was discharged from [place] [address] in stable condition at 12. She is to return on 2020 at 0800 for her next appointment.      Sonny Castillo RN  2020

## 2020-05-21 DIAGNOSIS — D80.1 HYPOGAMMAGLOBULINEMIA (HCC): ICD-10-CM

## 2020-05-26 ENCOUNTER — APPOINTMENT (OUTPATIENT)
Dept: INFUSION THERAPY | Age: 70
End: 2020-05-26

## 2020-05-28 ENCOUNTER — APPOINTMENT (OUTPATIENT)
Dept: INFUSION THERAPY | Age: 70
End: 2020-05-28

## 2020-06-04 ENCOUNTER — HOSPITAL ENCOUNTER (OUTPATIENT)
Dept: INFUSION THERAPY | Age: 70
Discharge: HOME | End: 2020-06-04
Payer: MEDICARE

## 2020-06-04 VITALS
DIASTOLIC BLOOD PRESSURE: 79 MMHG | HEART RATE: 77 BPM | TEMPERATURE: 97.5 F | OXYGEN SATURATION: 100 % | SYSTOLIC BLOOD PRESSURE: 146 MMHG | RESPIRATION RATE: 16 BRPM

## 2020-06-04 DIAGNOSIS — D80.1 HYPOGAMMAGLOBULINEMIA (HCC): Primary | ICD-10-CM

## 2020-06-04 LAB — IGG SERPL-MCNC: 685 MG/DL (ref 700–1600)

## 2020-06-04 PROCEDURE — 96365 THER/PROPH/DIAG IV INF INIT: CPT

## 2020-06-04 PROCEDURE — 74011250636 HC RX REV CODE- 250/636: Performed by: ALLERGY & IMMUNOLOGY

## 2020-06-04 PROCEDURE — 74011250636 HC RX REV CODE- 250/636

## 2020-06-04 PROCEDURE — 82784 ASSAY IGA/IGD/IGG/IGM EACH: CPT

## 2020-06-04 PROCEDURE — 74011250637 HC RX REV CODE- 250/637: Performed by: ALLERGY & IMMUNOLOGY

## 2020-06-04 PROCEDURE — 96366 THER/PROPH/DIAG IV INF ADDON: CPT

## 2020-06-04 RX ORDER — HEPARIN 100 UNIT/ML
300-500 SYRINGE INTRAVENOUS AS NEEDED
Status: CANCELLED
Start: 2020-06-18

## 2020-06-04 RX ORDER — ALBUTEROL SULFATE 0.83 MG/ML
2.5 SOLUTION RESPIRATORY (INHALATION) AS NEEDED
Status: CANCELLED
Start: 2020-06-18

## 2020-06-04 RX ORDER — DIPHENHYDRAMINE HYDROCHLORIDE 50 MG/ML
50 INJECTION, SOLUTION INTRAMUSCULAR; INTRAVENOUS AS NEEDED
Status: CANCELLED
Start: 2020-06-18

## 2020-06-04 RX ORDER — DIPHENHYDRAMINE HYDROCHLORIDE 50 MG/ML
25 INJECTION, SOLUTION INTRAMUSCULAR; INTRAVENOUS AS NEEDED
Status: CANCELLED
Start: 2020-06-18

## 2020-06-04 RX ORDER — ACETAMINOPHEN 325 MG/1
650 TABLET ORAL ONCE
Status: CANCELLED
Start: 2020-06-18

## 2020-06-04 RX ORDER — HEPARIN 100 UNIT/ML
300-500 SYRINGE INTRAVENOUS AS NEEDED
Status: ACTIVE | OUTPATIENT
Start: 2020-06-04 | End: 2020-06-04

## 2020-06-04 RX ORDER — SODIUM CHLORIDE 9 MG/ML
10 INJECTION INTRAMUSCULAR; INTRAVENOUS; SUBCUTANEOUS AS NEEDED
Status: CANCELLED | OUTPATIENT
Start: 2020-06-18

## 2020-06-04 RX ORDER — DIPHENHYDRAMINE HCL 25 MG
25 CAPSULE ORAL ONCE
Status: CANCELLED
Start: 2020-06-18

## 2020-06-04 RX ORDER — SODIUM CHLORIDE 9 MG/ML
25 INJECTION, SOLUTION INTRAVENOUS CONTINUOUS
Status: CANCELLED | OUTPATIENT
Start: 2020-06-18

## 2020-06-04 RX ORDER — HYDROCORTISONE SODIUM SUCCINATE 100 MG/2ML
100 INJECTION, POWDER, FOR SOLUTION INTRAMUSCULAR; INTRAVENOUS AS NEEDED
Status: CANCELLED | OUTPATIENT
Start: 2020-06-18

## 2020-06-04 RX ORDER — ACETAMINOPHEN 325 MG/1
650 TABLET ORAL AS NEEDED
Status: CANCELLED
Start: 2020-06-18

## 2020-06-04 RX ORDER — ACETAMINOPHEN 325 MG/1
650 TABLET ORAL ONCE
Status: COMPLETED | OUTPATIENT
Start: 2020-06-04 | End: 2020-06-04

## 2020-06-04 RX ORDER — DIPHENHYDRAMINE HCL 25 MG
25 CAPSULE ORAL ONCE
Status: ACTIVE | OUTPATIENT
Start: 2020-06-04 | End: 2020-06-04

## 2020-06-04 RX ORDER — EPINEPHRINE 1 MG/ML
0.3 INJECTION, SOLUTION, CONCENTRATE INTRAVENOUS AS NEEDED
Status: CANCELLED | OUTPATIENT
Start: 2020-06-18

## 2020-06-04 RX ORDER — ONDANSETRON 2 MG/ML
8 INJECTION INTRAMUSCULAR; INTRAVENOUS AS NEEDED
Status: CANCELLED | OUTPATIENT
Start: 2020-06-18

## 2020-06-04 RX ORDER — SODIUM CHLORIDE 0.9 % (FLUSH) 0.9 %
10 SYRINGE (ML) INJECTION AS NEEDED
Status: DISPENSED | OUTPATIENT
Start: 2020-06-04 | End: 2020-06-04

## 2020-06-04 RX ORDER — SODIUM CHLORIDE 9 MG/ML
10 INJECTION INTRAMUSCULAR; INTRAVENOUS; SUBCUTANEOUS AS NEEDED
Status: ACTIVE | OUTPATIENT
Start: 2020-06-04 | End: 2020-06-04

## 2020-06-04 RX ORDER — SODIUM CHLORIDE 9 MG/ML
25 INJECTION, SOLUTION INTRAVENOUS CONTINUOUS
Status: DISPENSED | OUTPATIENT
Start: 2020-06-04 | End: 2020-06-04

## 2020-06-04 RX ORDER — SODIUM CHLORIDE 0.9 % (FLUSH) 0.9 %
10 SYRINGE (ML) INJECTION AS NEEDED
Status: CANCELLED
Start: 2020-06-18

## 2020-06-04 RX ADMIN — IMMUNE GLOBULIN INTRAVENOUS (HUMAN): 5 INJECTION, SOLUTION INTRAVENOUS at 10:58

## 2020-06-04 RX ADMIN — SODIUM CHLORIDE 25 ML/HR: 9 INJECTION, SOLUTION INTRAVENOUS at 08:25

## 2020-06-04 RX ADMIN — IMMUNE GLOBULIN INTRAVENOUS (HUMAN): 5 INJECTION, SOLUTION INTRAVENOUS at 09:00

## 2020-06-04 RX ADMIN — ACETAMINOPHEN 650 MG: 325 TABLET ORAL at 08:22

## 2020-06-04 RX ADMIN — Medication 10 ML: at 08:16

## 2020-06-04 NOTE — PROGRESS NOTES
[facility] Progress Note    Date: 2020    Name: Halbert Duane    MRN: [id number]         : 1950      Ms. Hattie Nagy arrived to [facility] at 0800. Pt is here for Q 4 Week IVIG Infusion. Ms. Hattie Nagy was assessed and education was provided. Ms. Donaldo Coyle vitals were reviewed. Visit Vitals  /78 (BP 1 Location: Right arm, BP Patient Position: Sitting)   Pulse 75   Temp 98.3 °F (36.8 °C)   Resp 16   SpO2 100%   Breastfeeding No       Tylenol 650mg administered as ordered for pre-medication. 24g IV inserted in patient's right AC x1 attempt. Brisk blood return/ flushes without difficulty. IVIG 30 grams (Flebogamma) administered as ordered with the following rate titrations: 33 ml/hr x 30 minutes, 66 ml/hr x 30 minutes, 132 ml/hr x 30 minutes, and 264 ml/hr until completion. Pt's VS remained stable throughout infusion and pt denied complaints. Visit Vitals  /79 (BP 1 Location: Right arm, BP Patient Position: Sitting)   Pulse 77   Temp 97.5 °F (36.4 °C)   Resp 16   SpO2 100%   Breastfeeding No     Patient Vitals for the past 8 hrs:   Temp Pulse Resp BP SpO2   20 1135 97.5 °F (36.4 °C) 77 16 146/79 100 %   20 0930 98.3 °F (36.8 °C) 75 16 135/78 100 %   20 0900 97.9 °F (36.6 °C) 75 16 138/76 100 %   20 0753 97.5 °F (36.4 °C) 85 16 147/90 98 %         Pt's IV flushed w/ NS. Removed/ intact. Gauze/ coban to site. Pt's armband removed & shredded. Ms. Hattie Nagy was discharged from [place] [address] in stable condition at 1140. She is to return on 2020 at 0800 for her next appointment.      Stefani Gonzalez RN  2020

## 2020-06-18 DIAGNOSIS — D80.1 HYPOGAMMAGLOBULINEMIA (HCC): ICD-10-CM

## 2020-07-02 ENCOUNTER — HOSPITAL ENCOUNTER (OUTPATIENT)
Dept: INFUSION THERAPY | Age: 70
Discharge: HOME | End: 2020-07-02
Payer: MEDICARE

## 2020-07-02 VITALS
WEIGHT: 141.8 LBS | OXYGEN SATURATION: 98 % | BODY MASS INDEX: 24.34 KG/M2 | TEMPERATURE: 98.2 F | HEART RATE: 75 BPM | SYSTOLIC BLOOD PRESSURE: 123 MMHG | RESPIRATION RATE: 16 BRPM | DIASTOLIC BLOOD PRESSURE: 69 MMHG

## 2020-07-02 DIAGNOSIS — D80.1 HYPOGAMMAGLOBULINEMIA (HCC): Primary | ICD-10-CM

## 2020-07-02 PROCEDURE — 96365 THER/PROPH/DIAG IV INF INIT: CPT

## 2020-07-02 PROCEDURE — 74011250637 HC RX REV CODE- 250/637: Performed by: ALLERGY & IMMUNOLOGY

## 2020-07-02 PROCEDURE — 74011250636 HC RX REV CODE- 250/636: Performed by: ALLERGY & IMMUNOLOGY

## 2020-07-02 PROCEDURE — 96366 THER/PROPH/DIAG IV INF ADDON: CPT

## 2020-07-02 PROCEDURE — 74011250636 HC RX REV CODE- 250/636

## 2020-07-02 RX ORDER — HEPARIN 100 UNIT/ML
300-500 SYRINGE INTRAVENOUS AS NEEDED
Status: CANCELLED
Start: 2020-07-16

## 2020-07-02 RX ORDER — SODIUM CHLORIDE 9 MG/ML
25 INJECTION, SOLUTION INTRAVENOUS CONTINUOUS
Status: DISPENSED | OUTPATIENT
Start: 2020-07-02 | End: 2020-07-02

## 2020-07-02 RX ORDER — ONDANSETRON 2 MG/ML
8 INJECTION INTRAMUSCULAR; INTRAVENOUS AS NEEDED
Status: CANCELLED | OUTPATIENT
Start: 2020-07-16

## 2020-07-02 RX ORDER — ACETAMINOPHEN 325 MG/1
650 TABLET ORAL ONCE
Status: COMPLETED | OUTPATIENT
Start: 2020-07-02 | End: 2020-07-02

## 2020-07-02 RX ORDER — DIPHENHYDRAMINE HCL 25 MG
25 CAPSULE ORAL ONCE
Status: CANCELLED
Start: 2020-07-16

## 2020-07-02 RX ORDER — HYDROCORTISONE SODIUM SUCCINATE 100 MG/2ML
100 INJECTION, POWDER, FOR SOLUTION INTRAMUSCULAR; INTRAVENOUS AS NEEDED
Status: CANCELLED | OUTPATIENT
Start: 2020-07-16

## 2020-07-02 RX ORDER — SODIUM CHLORIDE 9 MG/ML
10 INJECTION INTRAMUSCULAR; INTRAVENOUS; SUBCUTANEOUS AS NEEDED
Status: CANCELLED | OUTPATIENT
Start: 2020-07-16

## 2020-07-02 RX ORDER — SODIUM CHLORIDE 9 MG/ML
25 INJECTION, SOLUTION INTRAVENOUS CONTINUOUS
Status: CANCELLED | OUTPATIENT
Start: 2020-07-16

## 2020-07-02 RX ORDER — EPINEPHRINE 1 MG/ML
0.3 INJECTION, SOLUTION, CONCENTRATE INTRAVENOUS AS NEEDED
Status: CANCELLED | OUTPATIENT
Start: 2020-07-16

## 2020-07-02 RX ORDER — ACETAMINOPHEN 325 MG/1
650 TABLET ORAL AS NEEDED
Status: CANCELLED
Start: 2020-07-16

## 2020-07-02 RX ORDER — SODIUM CHLORIDE 0.9 % (FLUSH) 0.9 %
10 SYRINGE (ML) INJECTION AS NEEDED
Status: CANCELLED
Start: 2020-07-16

## 2020-07-02 RX ORDER — DIPHENHYDRAMINE HYDROCHLORIDE 50 MG/ML
25 INJECTION, SOLUTION INTRAMUSCULAR; INTRAVENOUS AS NEEDED
Status: CANCELLED
Start: 2020-07-16

## 2020-07-02 RX ORDER — ACETAMINOPHEN 325 MG/1
650 TABLET ORAL ONCE
Status: CANCELLED
Start: 2020-07-16

## 2020-07-02 RX ORDER — SODIUM CHLORIDE 0.9 % (FLUSH) 0.9 %
10 SYRINGE (ML) INJECTION AS NEEDED
Status: DISPENSED | OUTPATIENT
Start: 2020-07-02 | End: 2020-07-02

## 2020-07-02 RX ORDER — DIPHENHYDRAMINE HYDROCHLORIDE 50 MG/ML
50 INJECTION, SOLUTION INTRAMUSCULAR; INTRAVENOUS AS NEEDED
Status: CANCELLED
Start: 2020-07-16

## 2020-07-02 RX ORDER — ALBUTEROL SULFATE 0.83 MG/ML
2.5 SOLUTION RESPIRATORY (INHALATION) AS NEEDED
Status: CANCELLED
Start: 2020-07-16

## 2020-07-02 RX ORDER — DIPHENHYDRAMINE HCL 25 MG
25 CAPSULE ORAL ONCE
Status: ACTIVE | OUTPATIENT
Start: 2020-07-02 | End: 2020-07-02

## 2020-07-02 RX ADMIN — IMMUNE GLOBULIN INTRAVENOUS (HUMAN) 20 G: 5 INJECTION, SOLUTION INTRAVENOUS at 10:20

## 2020-07-02 RX ADMIN — IMMUNE GLOBULIN INTRAVENOUS (HUMAN) 10 G: 5 INJECTION, SOLUTION INTRAVENOUS at 09:05

## 2020-07-02 RX ADMIN — SODIUM CHLORIDE 25 ML/HR: 9 INJECTION, SOLUTION INTRAVENOUS at 08:39

## 2020-07-02 RX ADMIN — ACETAMINOPHEN 650 MG: 325 TABLET ORAL at 08:34

## 2020-07-02 NOTE — PROGRESS NOTES
[facility] Progress Note    Date: 2020    Name: David Michele    MRN: [id number]         : 1950      Ms. Peter Ornelas arrived to [facility] at 9991. Pt is here for Q 4 Week IVIG Infusion. Ms. Peter Ornelas was assessed and education was provided. Ms. Carey Nose vitals were reviewed. Visit Vitals  /69 (BP 1 Location: Left arm, BP Patient Position: At rest;Sitting)   Pulse 75   Temp 98.2 °F (36.8 °C)   Resp 16   Wt 64.3 kg (141 lb 12.8 oz)   SpO2 98%   Breastfeeding No   BMI 24.34 kg/m²       Tylenol 650mg administered as ordered for pre-medication. 24g IV inserted in patient's right AC x2 attempts. Brisk blood return/ flushes without difficulty. IVIG 30 grams (Flebogamma) administered as ordered with the following rate titrations: 33 ml/hr x 30 minutes, 66 ml/hr x 30 minutes, 132 ml/hr x 30 minutes, and 264 ml/hr until completion. Pt's VS remained stable throughout infusion and pt denied complaints. Visit Vitals  /69 (BP 1 Location: Left arm, BP Patient Position: At rest;Sitting)   Pulse 75   Temp 98.2 °F (36.8 °C)   Resp 16   Wt 64.3 kg (141 lb 12.8 oz)   SpO2 98%   Breastfeeding No   BMI 24.34 kg/m²     Patient Vitals for the past 8 hrs:   Temp Pulse Resp BP SpO2   20 1115 98.2 °F (36.8 °C) 75 16 123/69 98 %   20 1035 -- 75 16 122/67 99 %   20 1005 -- 75 16 125/71 97 %   20 0936 -- 73 16 125/62 98 %   20 0905 98.2 °F (36.8 °C) 76 16 135/69 99 %   20 0815 98.5 °F (36.9 °C) 74 16 153/76 100 %         Pt's IV flushed w/ NS. Removed/ intact. Gauze/ coban to site. Pt's armband removed & shredded. Ms. Peter Ornelas was discharged from [place] [address] in stable condition at 1120. She is to return on 2020 at 0800 for her next appointment.        Lizzy Bang  2020

## 2020-07-16 DIAGNOSIS — D80.1 HYPOGAMMAGLOBULINEMIA (HCC): ICD-10-CM

## 2020-07-30 ENCOUNTER — HOSPITAL ENCOUNTER (OUTPATIENT)
Dept: INFUSION THERAPY | Age: 70
Discharge: HOME | End: 2020-07-30
Payer: MEDICARE

## 2020-07-30 VITALS
SYSTOLIC BLOOD PRESSURE: 135 MMHG | RESPIRATION RATE: 16 BRPM | TEMPERATURE: 98.1 F | DIASTOLIC BLOOD PRESSURE: 79 MMHG | OXYGEN SATURATION: 99 % | HEART RATE: 75 BPM

## 2020-07-30 DIAGNOSIS — D80.1 HYPOGAMMAGLOBULINEMIA (HCC): Primary | ICD-10-CM

## 2020-07-30 PROCEDURE — 74011250636 HC RX REV CODE- 250/636

## 2020-07-30 PROCEDURE — 96366 THER/PROPH/DIAG IV INF ADDON: CPT

## 2020-07-30 PROCEDURE — 96365 THER/PROPH/DIAG IV INF INIT: CPT

## 2020-07-30 PROCEDURE — 74011250637 HC RX REV CODE- 250/637: Performed by: ALLERGY & IMMUNOLOGY

## 2020-07-30 RX ORDER — EPINEPHRINE 1 MG/ML
0.3 INJECTION, SOLUTION, CONCENTRATE INTRAVENOUS AS NEEDED
Status: CANCELLED | OUTPATIENT
Start: 2020-08-13

## 2020-07-30 RX ORDER — SODIUM CHLORIDE 9 MG/ML
10 INJECTION INTRAMUSCULAR; INTRAVENOUS; SUBCUTANEOUS AS NEEDED
Status: DISCONTINUED | OUTPATIENT
Start: 2020-07-30 | End: 2020-07-30 | Stop reason: CLARIF

## 2020-07-30 RX ORDER — SODIUM CHLORIDE 0.9 % (FLUSH) 0.9 %
10 SYRINGE (ML) INJECTION AS NEEDED
Status: DISCONTINUED | OUTPATIENT
Start: 2020-07-30 | End: 2020-07-30 | Stop reason: CLARIF

## 2020-07-30 RX ORDER — ACETAMINOPHEN 325 MG/1
650 TABLET ORAL ONCE
Status: COMPLETED | OUTPATIENT
Start: 2020-07-30 | End: 2020-07-30

## 2020-07-30 RX ORDER — HEPARIN 100 UNIT/ML
300-500 SYRINGE INTRAVENOUS AS NEEDED
Status: CANCELLED
Start: 2020-08-13

## 2020-07-30 RX ORDER — SODIUM CHLORIDE 9 MG/ML
10 INJECTION INTRAMUSCULAR; INTRAVENOUS; SUBCUTANEOUS AS NEEDED
Status: CANCELLED | OUTPATIENT
Start: 2020-08-13

## 2020-07-30 RX ORDER — SODIUM CHLORIDE 9 MG/ML
25 INJECTION, SOLUTION INTRAVENOUS CONTINUOUS
Status: DISPENSED | OUTPATIENT
Start: 2020-07-30 | End: 2020-07-30

## 2020-07-30 RX ORDER — ACETAMINOPHEN 325 MG/1
650 TABLET ORAL AS NEEDED
Status: CANCELLED
Start: 2020-08-13

## 2020-07-30 RX ORDER — EPINEPHRINE 1 MG/ML
0.3 INJECTION, SOLUTION, CONCENTRATE INTRAVENOUS AS NEEDED
Status: DISCONTINUED | OUTPATIENT
Start: 2020-07-30 | End: 2020-07-30 | Stop reason: CLARIF

## 2020-07-30 RX ORDER — ACETAMINOPHEN 325 MG/1
650 TABLET ORAL AS NEEDED
Status: DISCONTINUED | OUTPATIENT
Start: 2020-07-30 | End: 2020-07-30 | Stop reason: CLARIF

## 2020-07-30 RX ORDER — ACETAMINOPHEN 325 MG/1
650 TABLET ORAL ONCE
Status: CANCELLED
Start: 2020-08-13

## 2020-07-30 RX ORDER — DIPHENHYDRAMINE HYDROCHLORIDE 50 MG/ML
50 INJECTION, SOLUTION INTRAMUSCULAR; INTRAVENOUS AS NEEDED
Status: DISCONTINUED | OUTPATIENT
Start: 2020-07-30 | End: 2020-07-30 | Stop reason: CLARIF

## 2020-07-30 RX ORDER — DIPHENHYDRAMINE HCL 25 MG
25 CAPSULE ORAL ONCE
Status: CANCELLED
Start: 2020-08-13

## 2020-07-30 RX ORDER — DIPHENHYDRAMINE HCL 25 MG
25 CAPSULE ORAL ONCE
Status: DISPENSED | OUTPATIENT
Start: 2020-07-30 | End: 2020-07-30

## 2020-07-30 RX ORDER — DIPHENHYDRAMINE HYDROCHLORIDE 50 MG/ML
25 INJECTION, SOLUTION INTRAMUSCULAR; INTRAVENOUS AS NEEDED
Status: DISCONTINUED | OUTPATIENT
Start: 2020-07-30 | End: 2020-07-30 | Stop reason: CLARIF

## 2020-07-30 RX ORDER — DIPHENHYDRAMINE HYDROCHLORIDE 50 MG/ML
25 INJECTION, SOLUTION INTRAMUSCULAR; INTRAVENOUS AS NEEDED
Status: CANCELLED
Start: 2020-08-13

## 2020-07-30 RX ORDER — HYDROCORTISONE SODIUM SUCCINATE 100 MG/2ML
100 INJECTION, POWDER, FOR SOLUTION INTRAMUSCULAR; INTRAVENOUS AS NEEDED
Status: DISCONTINUED | OUTPATIENT
Start: 2020-07-30 | End: 2020-07-30 | Stop reason: CLARIF

## 2020-07-30 RX ORDER — ALBUTEROL SULFATE 0.83 MG/ML
2.5 SOLUTION RESPIRATORY (INHALATION) AS NEEDED
Status: CANCELLED
Start: 2020-08-13

## 2020-07-30 RX ORDER — SODIUM CHLORIDE 9 MG/ML
25 INJECTION, SOLUTION INTRAVENOUS CONTINUOUS
Status: CANCELLED | OUTPATIENT
Start: 2020-08-13

## 2020-07-30 RX ORDER — HEPARIN 100 UNIT/ML
300-500 SYRINGE INTRAVENOUS AS NEEDED
Status: DISCONTINUED | OUTPATIENT
Start: 2020-07-30 | End: 2020-07-30 | Stop reason: CLARIF

## 2020-07-30 RX ORDER — DIPHENHYDRAMINE HYDROCHLORIDE 50 MG/ML
50 INJECTION, SOLUTION INTRAMUSCULAR; INTRAVENOUS AS NEEDED
Status: CANCELLED
Start: 2020-08-13

## 2020-07-30 RX ORDER — ONDANSETRON 2 MG/ML
8 INJECTION INTRAMUSCULAR; INTRAVENOUS AS NEEDED
Status: CANCELLED | OUTPATIENT
Start: 2020-08-13

## 2020-07-30 RX ORDER — ONDANSETRON 2 MG/ML
8 INJECTION INTRAMUSCULAR; INTRAVENOUS AS NEEDED
Status: DISCONTINUED | OUTPATIENT
Start: 2020-07-30 | End: 2020-07-30 | Stop reason: CLARIF

## 2020-07-30 RX ORDER — HYDROCORTISONE SODIUM SUCCINATE 100 MG/2ML
100 INJECTION, POWDER, FOR SOLUTION INTRAMUSCULAR; INTRAVENOUS AS NEEDED
Status: CANCELLED | OUTPATIENT
Start: 2020-08-13

## 2020-07-30 RX ORDER — ALBUTEROL SULFATE 0.83 MG/ML
2.5 SOLUTION RESPIRATORY (INHALATION) AS NEEDED
Status: DISCONTINUED | OUTPATIENT
Start: 2020-07-30 | End: 2020-07-30 | Stop reason: CLARIF

## 2020-07-30 RX ORDER — SODIUM CHLORIDE 0.9 % (FLUSH) 0.9 %
10 SYRINGE (ML) INJECTION AS NEEDED
Status: CANCELLED
Start: 2020-08-13

## 2020-07-30 RX ADMIN — IMMUNE GLOBULIN INTRAVENOUS (HUMAN) 10 G: 5 INJECTION, SOLUTION INTRAVENOUS at 08:45

## 2020-07-30 RX ADMIN — IMMUNE GLOBULIN INTRAVENOUS (HUMAN) 20 G: 5 INJECTION, SOLUTION INTRAVENOUS at 10:03

## 2020-07-30 RX ADMIN — ACETAMINOPHEN 650 MG: 325 TABLET ORAL at 08:14

## 2020-07-30 NOTE — PROGRESS NOTES
[facility] Progress Note    Date: 2020    Name: Amari Khan    MRN: [id number]         : 1950      Ms. Gladys Vaughn arrived to [facility] at 6567. Pt is here for Q 4 Week IVIG Infusion. Ms. Gladys Vaughn was assessed and education was provided. Ms. Lucy Singleton vitals were reviewed. Visit Vitals  /79   Pulse 75   Temp 98.1 °F (36.7 °C)   Resp 16   SpO2 99%       Tylenol 650mg administered as ordered for pre-medication. 24g IV inserted in patient's left AC x 1 attempt. Brisk blood return/ flushes without difficulty. IVIG 30 grams (Flebogamma) administered as ordered with the following rate titrations: 33 ml/hr x 30 minutes, 66 ml/hr x 30 minutes, 132 ml/hr x 30 minutes, and 264 ml/hr until completion. Pt's VS remained stable throughout infusion and pt denied complaints. Patient Vitals for the past 12 hrs:   Temp Pulse Resp BP SpO2   20 1104 98.1 °F (36.7 °C) 75 16 135/79 99 %   20 1016 97.9 °F (36.6 °C) 76 16 134/83 99 %   20 0945 98 °F (36.7 °C) 75 16 125/77 97 %   20 0913 98.1 °F (36.7 °C) 82 16 119/80 99 %   20 0810 98 °F (36.7 °C) 82 16 131/73 99 %       Pt's IV flushed w/ NS. Removed/ intact. Gauze/ coban to site. Pt's armband removed & shredded. Ms. Gladys Vaughn was discharged from [place] [address] in stable condition at 12. She is to return on 2020 at 0800 for her next appointment.        Cris Persaud RN  2020

## 2020-07-30 NOTE — PROGRESS NOTES
[facility] Progress Note    Date: 2020    Name: Gus Santacruz    MRN: [id number]         : 1950      Ms. Titi Gee arrived to [facility] at 1155. Pt is here for Q 4 Week IVIG Infusion. Ms. Titi Gee was assessed and education was provided. Ms. Barrera Smith vitals were reviewed. Visit Vitals  /73 (BP 1 Location: Left arm, BP Patient Position: Sitting)   Pulse 82   Temp 98 °F (36.7 °C)   Resp 16   SpO2 99%       Tylenol 650mg administered as ordered for pre-medication. 24g IV inserted in patient's left AC x 1 attempt. Brisk blood return/ flushes without difficulty. IVIG 30 grams (Flebogamma) administered as ordered with the following rate titrations: 33 ml/hr x 30 minutes, 66 ml/hr x 30 minutes, 132 ml/hr x 30 minutes, and 264 ml/hr until completion. Pt's VS remained stable throughout infusion and pt denied complaints. Pt's IV flushed w/ NS. Removed/ intact. Gauze/ coban to site. Pt's armband removed & shredded. Ms. Titi Gee was discharged from [place] [address] in stable condition at 1120. She is to return on 2020 at 0800 for her next appointment.        Saul Braswell RN  2020

## 2020-08-13 DIAGNOSIS — D80.1 HYPOGAMMAGLOBULINEMIA (HCC): ICD-10-CM

## 2020-08-27 ENCOUNTER — HOSPITAL ENCOUNTER (OUTPATIENT)
Dept: INFUSION THERAPY | Age: 70
Discharge: HOME | End: 2020-08-27
Payer: MEDICARE

## 2020-08-27 VITALS
DIASTOLIC BLOOD PRESSURE: 80 MMHG | OXYGEN SATURATION: 100 % | RESPIRATION RATE: 16 BRPM | HEART RATE: 73 BPM | TEMPERATURE: 97.7 F | WEIGHT: 142.6 LBS | SYSTOLIC BLOOD PRESSURE: 134 MMHG | BODY MASS INDEX: 24.48 KG/M2

## 2020-08-27 DIAGNOSIS — D80.1 HYPOGAMMAGLOBULINEMIA (HCC): Primary | ICD-10-CM

## 2020-08-27 PROCEDURE — 96365 THER/PROPH/DIAG IV INF INIT: CPT

## 2020-08-27 PROCEDURE — 74011250636 HC RX REV CODE- 250/636

## 2020-08-27 PROCEDURE — 96366 THER/PROPH/DIAG IV INF ADDON: CPT

## 2020-08-27 PROCEDURE — 74011250637 HC RX REV CODE- 250/637: Performed by: ALLERGY & IMMUNOLOGY

## 2020-08-27 RX ORDER — SODIUM CHLORIDE 9 MG/ML
25 INJECTION, SOLUTION INTRAVENOUS CONTINUOUS
Status: DISPENSED | OUTPATIENT
Start: 2020-08-27 | End: 2020-08-27

## 2020-08-27 RX ORDER — HYDROCORTISONE SODIUM SUCCINATE 100 MG/2ML
100 INJECTION, POWDER, FOR SOLUTION INTRAMUSCULAR; INTRAVENOUS AS NEEDED
Status: CANCELLED | OUTPATIENT
Start: 2020-09-10

## 2020-08-27 RX ORDER — ACETAMINOPHEN 325 MG/1
650 TABLET ORAL AS NEEDED
Status: CANCELLED
Start: 2020-09-10

## 2020-08-27 RX ORDER — SODIUM CHLORIDE 9 MG/ML
10 INJECTION INTRAMUSCULAR; INTRAVENOUS; SUBCUTANEOUS AS NEEDED
Status: CANCELLED | OUTPATIENT
Start: 2020-09-10

## 2020-08-27 RX ORDER — DIPHENHYDRAMINE HYDROCHLORIDE 50 MG/ML
25 INJECTION, SOLUTION INTRAMUSCULAR; INTRAVENOUS AS NEEDED
Status: CANCELLED
Start: 2020-09-10

## 2020-08-27 RX ORDER — DIPHENHYDRAMINE HCL 25 MG
25 CAPSULE ORAL ONCE
Status: CANCELLED
Start: 2020-09-10

## 2020-08-27 RX ORDER — ACETAMINOPHEN 325 MG/1
650 TABLET ORAL ONCE
Status: COMPLETED | OUTPATIENT
Start: 2020-08-27 | End: 2020-08-27

## 2020-08-27 RX ORDER — ACETAMINOPHEN 325 MG/1
650 TABLET ORAL ONCE
Status: CANCELLED
Start: 2020-09-10

## 2020-08-27 RX ORDER — SODIUM CHLORIDE 0.9 % (FLUSH) 0.9 %
10 SYRINGE (ML) INJECTION AS NEEDED
Status: DISPENSED | OUTPATIENT
Start: 2020-08-27 | End: 2020-08-27

## 2020-08-27 RX ORDER — ONDANSETRON 2 MG/ML
8 INJECTION INTRAMUSCULAR; INTRAVENOUS AS NEEDED
Status: CANCELLED | OUTPATIENT
Start: 2020-09-10

## 2020-08-27 RX ORDER — SODIUM CHLORIDE 9 MG/ML
25 INJECTION, SOLUTION INTRAVENOUS CONTINUOUS
Status: CANCELLED | OUTPATIENT
Start: 2020-09-10

## 2020-08-27 RX ORDER — EPINEPHRINE 1 MG/ML
0.3 INJECTION, SOLUTION, CONCENTRATE INTRAVENOUS AS NEEDED
Status: CANCELLED | OUTPATIENT
Start: 2020-09-10

## 2020-08-27 RX ORDER — SODIUM CHLORIDE 0.9 % (FLUSH) 0.9 %
10 SYRINGE (ML) INJECTION AS NEEDED
Status: CANCELLED
Start: 2020-09-10

## 2020-08-27 RX ORDER — ALBUTEROL SULFATE 0.83 MG/ML
2.5 SOLUTION RESPIRATORY (INHALATION) AS NEEDED
Status: CANCELLED
Start: 2020-09-10

## 2020-08-27 RX ORDER — DIPHENHYDRAMINE HCL 25 MG
25 CAPSULE ORAL ONCE
Status: DISPENSED | OUTPATIENT
Start: 2020-08-27 | End: 2020-08-27

## 2020-08-27 RX ORDER — DIPHENHYDRAMINE HYDROCHLORIDE 50 MG/ML
50 INJECTION, SOLUTION INTRAMUSCULAR; INTRAVENOUS AS NEEDED
Status: CANCELLED
Start: 2020-09-10

## 2020-08-27 RX ORDER — HEPARIN 100 UNIT/ML
300-500 SYRINGE INTRAVENOUS AS NEEDED
Status: CANCELLED
Start: 2020-09-10

## 2020-08-27 RX ADMIN — IMMUNE GLOBULIN INTRAVENOUS (HUMAN) 10 G: 5 INJECTION, SOLUTION INTRAVENOUS at 10:43

## 2020-08-27 RX ADMIN — IMMUNE GLOBULIN INTRAVENOUS (HUMAN) 20 G: 5 INJECTION, SOLUTION INTRAVENOUS at 08:53

## 2020-08-27 RX ADMIN — Medication 10 ML: at 11:10

## 2020-08-27 RX ADMIN — ACETAMINOPHEN 650 MG: 325 TABLET ORAL at 08:40

## 2020-08-27 RX ADMIN — Medication 10 ML: at 08:53

## 2020-08-27 NOTE — PROGRESS NOTES
[facility] Progress Note    Date: 2020    Name: lAea Villanueva    MRN: [id number]         : 1950      Ms. Addie Saldivar arrived to [facility] at [address]. Pt is here for Q 4 Week IVIG Infusion. Ms. Addie Saldivar was assessed and education was provided. Ms. Ruthie Hogue vitals were reviewed. Visit Vitals  /71 (BP 1 Location: Left arm, BP Patient Position: Sitting)   Pulse 88   Temp 98.3 °F (36.8 °C)   Resp 16   Wt 64.7 kg (142 lb 9.6 oz)   SpO2 99%   BMI 24.48 kg/m²       Tylenol 650mg administered as ordered for pre-medication. Pt politely refused Benadryl.    22g IV inserted in patient's right AC x1 attempt. Brisk blood return/ flushes without difficulty. IVIG 30 grams (Flebogamma 10%) administered as ordered with the following rate titrations: 33 ml/hr x 30 minutes, 66 ml/hr x 30 minutes, 132 ml/hr x 30 minutes, and 264 ml/hr until completion. Pt's VS remained stable throughout infusion and pt denied complaints. Pt's IV flushed w/ NS. Removed/ intact. Gauze/ coban to site. Pt's armband removed & shredded. Ms. Addie Saldivar was discharged from [place] [address] in stable condition at 1120. She is to return on 20 at 0800 for her next appointment.      Dirk Hernandez RN  2020

## 2020-09-10 DIAGNOSIS — D80.1 HYPOGAMMAGLOBULINEMIA (HCC): ICD-10-CM

## 2020-09-24 ENCOUNTER — HOSPITAL ENCOUNTER (OUTPATIENT)
Dept: INFUSION THERAPY | Age: 70
Discharge: HOME | End: 2020-09-24
Payer: MEDICARE

## 2020-09-24 VITALS
HEART RATE: 78 BPM | WEIGHT: 140 LBS | BODY MASS INDEX: 24.03 KG/M2 | DIASTOLIC BLOOD PRESSURE: 76 MMHG | RESPIRATION RATE: 16 BRPM | OXYGEN SATURATION: 98 % | TEMPERATURE: 98.2 F | SYSTOLIC BLOOD PRESSURE: 128 MMHG

## 2020-09-24 DIAGNOSIS — D80.1 HYPOGAMMAGLOBULINEMIA (HCC): Primary | ICD-10-CM

## 2020-09-24 PROCEDURE — 96366 THER/PROPH/DIAG IV INF ADDON: CPT

## 2020-09-24 PROCEDURE — 74011250636 HC RX REV CODE- 250/636

## 2020-09-24 PROCEDURE — 96365 THER/PROPH/DIAG IV INF INIT: CPT

## 2020-09-24 PROCEDURE — 74011250637 HC RX REV CODE- 250/637: Performed by: ALLERGY & IMMUNOLOGY

## 2020-09-24 RX ORDER — ACETAMINOPHEN 325 MG/1
650 TABLET ORAL ONCE
Status: CANCELLED
Start: 2020-10-08

## 2020-09-24 RX ORDER — ALBUTEROL SULFATE 0.83 MG/ML
2.5 SOLUTION RESPIRATORY (INHALATION) AS NEEDED
Status: CANCELLED
Start: 2020-10-08

## 2020-09-24 RX ORDER — DIPHENHYDRAMINE HYDROCHLORIDE 50 MG/ML
25 INJECTION, SOLUTION INTRAMUSCULAR; INTRAVENOUS AS NEEDED
Status: CANCELLED
Start: 2020-10-08

## 2020-09-24 RX ORDER — ACETAMINOPHEN 325 MG/1
650 TABLET ORAL ONCE
Status: COMPLETED | OUTPATIENT
Start: 2020-09-24 | End: 2020-09-24

## 2020-09-24 RX ORDER — SODIUM CHLORIDE 9 MG/ML
25 INJECTION, SOLUTION INTRAVENOUS CONTINUOUS
Status: CANCELLED | OUTPATIENT
Start: 2020-10-08

## 2020-09-24 RX ORDER — SODIUM CHLORIDE 0.9 % (FLUSH) 0.9 %
10 SYRINGE (ML) INJECTION AS NEEDED
Status: DISPENSED | OUTPATIENT
Start: 2020-09-24 | End: 2020-09-24

## 2020-09-24 RX ORDER — HYDROCORTISONE SODIUM SUCCINATE 100 MG/2ML
100 INJECTION, POWDER, FOR SOLUTION INTRAMUSCULAR; INTRAVENOUS AS NEEDED
Status: CANCELLED | OUTPATIENT
Start: 2020-10-08

## 2020-09-24 RX ORDER — SODIUM CHLORIDE 9 MG/ML
10 INJECTION INTRAMUSCULAR; INTRAVENOUS; SUBCUTANEOUS AS NEEDED
Status: CANCELLED | OUTPATIENT
Start: 2020-10-08

## 2020-09-24 RX ORDER — ACETAMINOPHEN 325 MG/1
650 TABLET ORAL AS NEEDED
Status: CANCELLED
Start: 2020-10-08

## 2020-09-24 RX ORDER — EPINEPHRINE 1 MG/ML
0.3 INJECTION, SOLUTION, CONCENTRATE INTRAVENOUS AS NEEDED
Status: CANCELLED | OUTPATIENT
Start: 2020-10-08

## 2020-09-24 RX ORDER — SODIUM CHLORIDE 0.9 % (FLUSH) 0.9 %
10 SYRINGE (ML) INJECTION AS NEEDED
Status: CANCELLED
Start: 2020-10-08

## 2020-09-24 RX ORDER — DIPHENHYDRAMINE HYDROCHLORIDE 50 MG/ML
50 INJECTION, SOLUTION INTRAMUSCULAR; INTRAVENOUS AS NEEDED
Status: CANCELLED
Start: 2020-10-08

## 2020-09-24 RX ORDER — ONDANSETRON 2 MG/ML
8 INJECTION INTRAMUSCULAR; INTRAVENOUS AS NEEDED
Status: CANCELLED | OUTPATIENT
Start: 2020-10-08

## 2020-09-24 RX ORDER — DIPHENHYDRAMINE HCL 25 MG
25 CAPSULE ORAL ONCE
Status: CANCELLED
Start: 2020-10-08

## 2020-09-24 RX ORDER — HEPARIN 100 UNIT/ML
300-500 SYRINGE INTRAVENOUS AS NEEDED
Status: CANCELLED
Start: 2020-10-08

## 2020-09-24 RX ADMIN — IMMUNE GLOBULIN INTRAVENOUS (HUMAN) 10 G: 5 INJECTION, SOLUTION INTRAVENOUS at 10:30

## 2020-09-24 RX ADMIN — Medication 10 ML: at 10:59

## 2020-09-24 RX ADMIN — Medication 10 ML: at 08:32

## 2020-09-24 RX ADMIN — IMMUNE GLOBULIN INTRAVENOUS (HUMAN) 20 G: 5 INJECTION, SOLUTION INTRAVENOUS at 08:32

## 2020-09-24 RX ADMIN — ACETAMINOPHEN 650 MG: 325 TABLET ORAL at 08:20

## 2020-09-24 NOTE — PROGRESS NOTES
[address] [facility] Progress Note    Date: 2020    Name: Britney Hayes    MRN: [id number]         : 1950      Ms. Alyx Frausto arrived to [facility] at 5237. Pt is here for Q 4 Week IVIG Infusion. Ms. Alyx Frausto was assessed and education was provided. Ms. Lina Carroll vitals were reviewed. Visit Vitals  BP (!) 148/78 (BP 1 Location: Right arm, BP Patient Position: Sitting)   Pulse 85   Temp 98.1 °F (36.7 °C)   Resp 16   Wt 63.5 kg (140 lb)   SpO2 100%   Breastfeeding No   BMI 24.03 kg/m²       Tylenol 650mg administered as ordered for pre-medication. Pt politely refused Benadryl.    22g IV inserted in patient's right AC x1 attempt. Brisk blood return/ flushes without difficulty. IVIG 30 grams (Flebogamma 10%) administered as ordered with the following rate titrations: 33 ml/hr x 30 minutes, 66 ml/hr x 30 minutes, 132 ml/hr x 30 minutes, and 264 ml/hr until completion. Pt's VS remained stable throughout infusion and pt denied complaints. Pt's IV flushed w/ NS. Removed/ intact. Gauze/ coban to site. Pt's armband removed & shredded. Ms. Alyx Frausto was discharged from [place] [address] in stable condition at 1100. She is to return on 10/22/20 at 0800 for her next appointment.      Maggi Mcclellan RN  2020

## 2020-10-08 DIAGNOSIS — D80.1 HYPOGAMMAGLOBULINEMIA (HCC): ICD-10-CM

## 2020-10-22 ENCOUNTER — HOSPITAL ENCOUNTER (OUTPATIENT)
Dept: INFUSION THERAPY | Age: 70
Discharge: HOME | End: 2020-10-22
Payer: MEDICARE

## 2020-10-22 VITALS
DIASTOLIC BLOOD PRESSURE: 78 MMHG | HEART RATE: 78 BPM | RESPIRATION RATE: 16 BRPM | OXYGEN SATURATION: 99 % | SYSTOLIC BLOOD PRESSURE: 119 MMHG | TEMPERATURE: 97.7 F

## 2020-10-22 DIAGNOSIS — D80.1 HYPOGAMMAGLOBULINEMIA (HCC): Primary | ICD-10-CM

## 2020-10-22 PROCEDURE — 96366 THER/PROPH/DIAG IV INF ADDON: CPT

## 2020-10-22 PROCEDURE — 74011250636 HC RX REV CODE- 250/636

## 2020-10-22 PROCEDURE — 74011250637 HC RX REV CODE- 250/637: Performed by: ALLERGY & IMMUNOLOGY

## 2020-10-22 PROCEDURE — 74011250636 HC RX REV CODE- 250/636: Performed by: ALLERGY & IMMUNOLOGY

## 2020-10-22 PROCEDURE — 96365 THER/PROPH/DIAG IV INF INIT: CPT

## 2020-10-22 RX ORDER — HYDROCORTISONE SODIUM SUCCINATE 100 MG/2ML
100 INJECTION, POWDER, FOR SOLUTION INTRAMUSCULAR; INTRAVENOUS AS NEEDED
Status: CANCELLED | OUTPATIENT
Start: 2020-11-05

## 2020-10-22 RX ORDER — HEPARIN 100 UNIT/ML
300-500 SYRINGE INTRAVENOUS AS NEEDED
Status: CANCELLED
Start: 2020-11-05

## 2020-10-22 RX ORDER — EPINEPHRINE 1 MG/ML
0.3 INJECTION, SOLUTION, CONCENTRATE INTRAVENOUS AS NEEDED
Status: CANCELLED | OUTPATIENT
Start: 2020-11-05

## 2020-10-22 RX ORDER — DIPHENHYDRAMINE HYDROCHLORIDE 50 MG/ML
50 INJECTION, SOLUTION INTRAMUSCULAR; INTRAVENOUS AS NEEDED
Status: CANCELLED
Start: 2020-11-05

## 2020-10-22 RX ORDER — SODIUM CHLORIDE 9 MG/ML
25 INJECTION, SOLUTION INTRAVENOUS CONTINUOUS
Status: CANCELLED | OUTPATIENT
Start: 2020-11-05

## 2020-10-22 RX ORDER — DIPHENHYDRAMINE HCL 25 MG
25 CAPSULE ORAL ONCE
Status: CANCELLED
Start: 2020-11-05

## 2020-10-22 RX ORDER — SODIUM CHLORIDE 0.9 % (FLUSH) 0.9 %
10 SYRINGE (ML) INJECTION AS NEEDED
Status: DISPENSED | OUTPATIENT
Start: 2020-10-22 | End: 2020-10-22

## 2020-10-22 RX ORDER — ACETAMINOPHEN 325 MG/1
650 TABLET ORAL AS NEEDED
Status: CANCELLED
Start: 2020-11-05

## 2020-10-22 RX ORDER — DIPHENHYDRAMINE HCL 25 MG
25 CAPSULE ORAL ONCE
Status: DISPENSED | OUTPATIENT
Start: 2020-10-22 | End: 2020-10-22

## 2020-10-22 RX ORDER — SODIUM CHLORIDE 0.9 % (FLUSH) 0.9 %
10 SYRINGE (ML) INJECTION AS NEEDED
Status: CANCELLED
Start: 2020-11-05

## 2020-10-22 RX ORDER — ACETAMINOPHEN 325 MG/1
650 TABLET ORAL ONCE
Status: CANCELLED
Start: 2020-11-05

## 2020-10-22 RX ORDER — SODIUM CHLORIDE 9 MG/ML
25 INJECTION, SOLUTION INTRAVENOUS CONTINUOUS
Status: DISPENSED | OUTPATIENT
Start: 2020-10-22 | End: 2020-10-22

## 2020-10-22 RX ORDER — ONDANSETRON 2 MG/ML
8 INJECTION INTRAMUSCULAR; INTRAVENOUS AS NEEDED
Status: CANCELLED | OUTPATIENT
Start: 2020-11-05

## 2020-10-22 RX ORDER — DIPHENHYDRAMINE HYDROCHLORIDE 50 MG/ML
25 INJECTION, SOLUTION INTRAMUSCULAR; INTRAVENOUS AS NEEDED
Status: CANCELLED
Start: 2020-11-05

## 2020-10-22 RX ORDER — ACETAMINOPHEN 325 MG/1
650 TABLET ORAL ONCE
Status: COMPLETED | OUTPATIENT
Start: 2020-10-22 | End: 2020-10-22

## 2020-10-22 RX ORDER — ALBUTEROL SULFATE 0.83 MG/ML
2.5 SOLUTION RESPIRATORY (INHALATION) AS NEEDED
Status: CANCELLED
Start: 2020-11-05

## 2020-10-22 RX ORDER — SODIUM CHLORIDE 9 MG/ML
10 INJECTION INTRAMUSCULAR; INTRAVENOUS; SUBCUTANEOUS AS NEEDED
Status: CANCELLED | OUTPATIENT
Start: 2020-11-05

## 2020-10-22 RX ADMIN — IMMUNE GLOBULIN INTRAVENOUS (HUMAN) 20 G: 5 INJECTION, SOLUTION INTRAVENOUS at 10:03

## 2020-10-22 RX ADMIN — ACETAMINOPHEN 650 MG: 325 TABLET ORAL at 08:21

## 2020-10-22 RX ADMIN — IMMUNE GLOBULIN INTRAVENOUS (HUMAN) 10 G: 5 INJECTION, SOLUTION INTRAVENOUS at 08:45

## 2020-10-22 RX ADMIN — SODIUM CHLORIDE 25 ML/HR: 9 INJECTION, SOLUTION INTRAVENOUS at 08:25

## 2020-10-22 RX ADMIN — Medication 10 ML: at 11:00

## 2020-10-22 NOTE — PROGRESS NOTES
[facility] Progress Note    Date: 2020    Name: Jose Antonio Dominguez    MRN: [id number]         : 1950      Ms. Jose Antonio Mitchell arrived to [facility] at [address] [facility]. Pt is here for Q 4 Week IVIG Infusion. Ms. Jose Antonio Mitchell was assessed and education was provided. Ms. Aubrie Narvaez vitals were reviewed. Visit Vitals  /71 (BP 1 Location: Left arm, BP Patient Position: Sitting)   Pulse 87   Temp 98.1 °F (36.7 °C)   Resp 16   SpO2 99%       Tylenol 650mg administered as ordered for pre-medication. Pt politely declined Benadryl.    24g IV inserted in patient's left AC x1 attempt. Brisk blood return/ flushes without difficulty. IVIG 30 grams (Flebogamma 10%) administered as ordered with the following rate titrations: 33 ml/hr x 30 minutes, 66 ml/hr x 30 minutes, 132 ml/hr x 30 minutes, and 264 ml/hr until completion. Pt's VS remained stable throughout infusion and pt denied complaints. Pt's IV flushed w/ NS. Removed/ intact. Gauze/ coban to site. Pt's armband removed & shredded. Ms. Jose Antonio Mitchell was discharged from [place] [address] in stable condition at . She is to return on 20 at 0800 for her next appointment.         Jose Avila  2020

## 2020-11-05 DIAGNOSIS — D80.1 HYPOGAMMAGLOBULINEMIA (HCC): ICD-10-CM

## 2020-11-19 ENCOUNTER — HOSPITAL ENCOUNTER (OUTPATIENT)
Dept: INFUSION THERAPY | Age: 70
Discharge: HOME | End: 2020-11-19
Payer: MEDICARE

## 2020-11-19 VITALS
HEART RATE: 80 BPM | OXYGEN SATURATION: 100 % | RESPIRATION RATE: 18 BRPM | DIASTOLIC BLOOD PRESSURE: 86 MMHG | TEMPERATURE: 98.2 F | SYSTOLIC BLOOD PRESSURE: 137 MMHG

## 2020-11-19 DIAGNOSIS — D80.1 HYPOGAMMAGLOBULINEMIA (HCC): Primary | ICD-10-CM

## 2020-11-19 PROCEDURE — 74011250636 HC RX REV CODE- 250/636

## 2020-11-19 PROCEDURE — 74011250637 HC RX REV CODE- 250/637: Performed by: ALLERGY & IMMUNOLOGY

## 2020-11-19 PROCEDURE — 96365 THER/PROPH/DIAG IV INF INIT: CPT

## 2020-11-19 PROCEDURE — 96366 THER/PROPH/DIAG IV INF ADDON: CPT

## 2020-11-19 RX ORDER — DIPHENHYDRAMINE HYDROCHLORIDE 50 MG/ML
25 INJECTION, SOLUTION INTRAMUSCULAR; INTRAVENOUS AS NEEDED
Status: CANCELLED
Start: 2020-12-03

## 2020-11-19 RX ORDER — HYDROCORTISONE SODIUM SUCCINATE 100 MG/2ML
100 INJECTION, POWDER, FOR SOLUTION INTRAMUSCULAR; INTRAVENOUS AS NEEDED
Status: CANCELLED | OUTPATIENT
Start: 2020-12-03

## 2020-11-19 RX ORDER — ACETAMINOPHEN 325 MG/1
650 TABLET ORAL AS NEEDED
Status: CANCELLED
Start: 2020-12-03

## 2020-11-19 RX ORDER — DIPHENHYDRAMINE HCL 25 MG
25 CAPSULE ORAL ONCE
Status: ACTIVE | OUTPATIENT
Start: 2020-11-19 | End: 2020-11-19

## 2020-11-19 RX ORDER — ACETAMINOPHEN 325 MG/1
650 TABLET ORAL ONCE
Status: CANCELLED
Start: 2020-12-03

## 2020-11-19 RX ORDER — DIPHENHYDRAMINE HYDROCHLORIDE 50 MG/ML
50 INJECTION, SOLUTION INTRAMUSCULAR; INTRAVENOUS AS NEEDED
Status: CANCELLED
Start: 2020-12-03

## 2020-11-19 RX ORDER — EPINEPHRINE 1 MG/ML
0.3 INJECTION, SOLUTION, CONCENTRATE INTRAVENOUS AS NEEDED
Status: CANCELLED | OUTPATIENT
Start: 2020-12-03

## 2020-11-19 RX ORDER — ALBUTEROL SULFATE 0.83 MG/ML
2.5 SOLUTION RESPIRATORY (INHALATION) AS NEEDED
Status: CANCELLED
Start: 2020-12-03

## 2020-11-19 RX ORDER — SODIUM CHLORIDE 9 MG/ML
25 INJECTION, SOLUTION INTRAVENOUS CONTINUOUS
Status: DISPENSED | OUTPATIENT
Start: 2020-11-19 | End: 2020-11-19

## 2020-11-19 RX ORDER — ACETAMINOPHEN 325 MG/1
650 TABLET ORAL ONCE
Status: COMPLETED | OUTPATIENT
Start: 2020-11-19 | End: 2020-11-19

## 2020-11-19 RX ORDER — HEPARIN 100 UNIT/ML
300-500 SYRINGE INTRAVENOUS AS NEEDED
Status: CANCELLED
Start: 2020-12-03

## 2020-11-19 RX ORDER — DIPHENHYDRAMINE HCL 25 MG
25 CAPSULE ORAL ONCE
Status: CANCELLED
Start: 2020-12-03

## 2020-11-19 RX ORDER — SODIUM CHLORIDE 9 MG/ML
10 INJECTION INTRAMUSCULAR; INTRAVENOUS; SUBCUTANEOUS AS NEEDED
Status: CANCELLED | OUTPATIENT
Start: 2020-12-03

## 2020-11-19 RX ORDER — SODIUM CHLORIDE 9 MG/ML
25 INJECTION, SOLUTION INTRAVENOUS CONTINUOUS
Status: CANCELLED | OUTPATIENT
Start: 2020-12-03

## 2020-11-19 RX ORDER — SODIUM CHLORIDE 0.9 % (FLUSH) 0.9 %
10 SYRINGE (ML) INJECTION AS NEEDED
Status: CANCELLED
Start: 2020-12-03

## 2020-11-19 RX ORDER — ONDANSETRON 2 MG/ML
8 INJECTION INTRAMUSCULAR; INTRAVENOUS AS NEEDED
Status: CANCELLED | OUTPATIENT
Start: 2020-12-03

## 2020-11-19 RX ORDER — SODIUM CHLORIDE 0.9 % (FLUSH) 0.9 %
10 SYRINGE (ML) INJECTION AS NEEDED
Status: DISPENSED | OUTPATIENT
Start: 2020-11-19 | End: 2020-11-19

## 2020-11-19 RX ADMIN — ACETAMINOPHEN 650 MG: 325 TABLET ORAL at 08:35

## 2020-11-19 RX ADMIN — IMMUNE GLOBULIN INTRAVENOUS (HUMAN) 10 G: 5 INJECTION, SOLUTION INTRAVENOUS at 09:05

## 2020-11-19 RX ADMIN — Medication 10 ML: at 11:20

## 2020-11-19 RX ADMIN — IMMUNE GLOBULIN INTRAVENOUS (HUMAN) 20 G: 5 INJECTION, SOLUTION INTRAVENOUS at 10:22

## 2020-11-19 NOTE — PROGRESS NOTES
[facility] Progress Note    Date: 2020    Name: Zackary Rae    MRN: [id number]         : 1950      Ms. Hannah Vallejo arrived to [facility] at 5743. Pt is here for Q 4 Week IVIG Infusion. Ms. Hannah Vallejo was assessed and education was provided. Ms. Stephanie Hoffman vitals were reviewed. Visit Vitals  /80 (BP 1 Location: Left arm, BP Patient Position: Sitting)   Pulse 85   Temp 97.5 °F (36.4 °C)   Resp 18   Breastfeeding No       Tylenol 650mg administered as ordered for pre-medication. Pt politely declined Benadryl.    24g IV inserted in patient's left AC x2 attempts. Brisk blood return/ flushes without difficulty. IVIG 30 grams (Flebogamma 10%) administered as ordered with the following rate titrations: 33 ml/hr x 30 minutes, 66 ml/hr x 30 minutes, 132 ml/hr x 30 minutes, and 264 ml/hr until completion. Pt's VS remained stable throughout infusion and pt denied complaints. Pt's IV flushed w/ NS. Removed/ intact. Gauze/ coban to site. Pt's armband removed & shredded. Ms. Hannah Vallejo was discharged from [place] [address] in stable condition at 1125. She is to return on 20 at 0800 for her next appointment.         Rick De La Cruz  2020

## 2020-12-01 ENCOUNTER — TRANSCRIBE ORDER (OUTPATIENT)
Dept: SCHEDULING | Age: 70
End: 2020-12-01

## 2020-12-01 DIAGNOSIS — R10.13 EPIGASTRIC PAIN: Primary | ICD-10-CM

## 2020-12-01 DIAGNOSIS — R11.2 NAUSEA AND VOMITING: ICD-10-CM

## 2020-12-03 DIAGNOSIS — D80.1 HYPOGAMMAGLOBULINEMIA (HCC): ICD-10-CM

## 2020-12-04 ENCOUNTER — HOSPITAL ENCOUNTER (OUTPATIENT)
Dept: NUCLEAR MEDICINE | Age: 70
Discharge: HOME | End: 2020-12-04
Attending: INTERNAL MEDICINE
Payer: MEDICARE

## 2020-12-04 DIAGNOSIS — R11.2 NAUSEA AND VOMITING: ICD-10-CM

## 2020-12-04 DIAGNOSIS — R10.13 EPIGASTRIC PAIN: ICD-10-CM

## 2020-12-04 PROCEDURE — 78264 GASTRIC EMPTYING IMG STUDY: CPT

## 2020-12-17 ENCOUNTER — HOSPITAL ENCOUNTER (OUTPATIENT)
Dept: INFUSION THERAPY | Age: 70
Discharge: HOME | End: 2020-12-17
Payer: MEDICARE

## 2020-12-17 VITALS
OXYGEN SATURATION: 99 % | RESPIRATION RATE: 16 BRPM | HEIGHT: 64 IN | WEIGHT: 150.6 LBS | TEMPERATURE: 97 F | DIASTOLIC BLOOD PRESSURE: 70 MMHG | HEART RATE: 83 BPM | SYSTOLIC BLOOD PRESSURE: 142 MMHG | BODY MASS INDEX: 25.71 KG/M2

## 2020-12-17 DIAGNOSIS — D80.1 HYPOGAMMAGLOBULINEMIA (HCC): Primary | ICD-10-CM

## 2020-12-17 PROCEDURE — 96365 THER/PROPH/DIAG IV INF INIT: CPT

## 2020-12-17 PROCEDURE — 82784 ASSAY IGA/IGD/IGG/IGM EACH: CPT

## 2020-12-17 PROCEDURE — 74011250636 HC RX REV CODE- 250/636

## 2020-12-17 PROCEDURE — 74011250636 HC RX REV CODE- 250/636: Performed by: ALLERGY & IMMUNOLOGY

## 2020-12-17 PROCEDURE — 96366 THER/PROPH/DIAG IV INF ADDON: CPT

## 2020-12-17 RX ORDER — HYDROCORTISONE SODIUM SUCCINATE 100 MG/2ML
100 INJECTION, POWDER, FOR SOLUTION INTRAMUSCULAR; INTRAVENOUS AS NEEDED
Status: CANCELLED | OUTPATIENT
Start: 2020-12-31

## 2020-12-17 RX ORDER — SODIUM CHLORIDE 0.9 % (FLUSH) 0.9 %
10 SYRINGE (ML) INJECTION AS NEEDED
Status: CANCELLED
Start: 2020-12-31

## 2020-12-17 RX ORDER — SODIUM CHLORIDE 0.9 % (FLUSH) 0.9 %
10 SYRINGE (ML) INJECTION AS NEEDED
Status: DISPENSED | OUTPATIENT
Start: 2020-12-17 | End: 2020-12-17

## 2020-12-17 RX ORDER — SODIUM CHLORIDE 9 MG/ML
25 INJECTION, SOLUTION INTRAVENOUS CONTINUOUS
Status: DISPENSED | OUTPATIENT
Start: 2020-12-17 | End: 2020-12-17

## 2020-12-17 RX ORDER — ACETAMINOPHEN 325 MG/1
650 TABLET ORAL ONCE
Status: CANCELLED
Start: 2020-12-31 | End: 2020-12-31

## 2020-12-17 RX ORDER — ONDANSETRON 2 MG/ML
8 INJECTION INTRAMUSCULAR; INTRAVENOUS AS NEEDED
Status: CANCELLED | OUTPATIENT
Start: 2020-12-31

## 2020-12-17 RX ORDER — ACETAMINOPHEN 325 MG/1
650 TABLET ORAL AS NEEDED
Status: CANCELLED
Start: 2020-12-31

## 2020-12-17 RX ORDER — DIPHENHYDRAMINE HCL 25 MG
25 CAPSULE ORAL ONCE
Status: CANCELLED
Start: 2020-12-31 | End: 2020-12-31

## 2020-12-17 RX ORDER — SODIUM CHLORIDE 9 MG/ML
25 INJECTION, SOLUTION INTRAVENOUS CONTINUOUS
Status: CANCELLED | OUTPATIENT
Start: 2020-12-31

## 2020-12-17 RX ORDER — DIPHENHYDRAMINE HYDROCHLORIDE 50 MG/ML
50 INJECTION, SOLUTION INTRAMUSCULAR; INTRAVENOUS AS NEEDED
Status: CANCELLED
Start: 2020-12-31

## 2020-12-17 RX ORDER — DIPHENHYDRAMINE HYDROCHLORIDE 50 MG/ML
25 INJECTION, SOLUTION INTRAMUSCULAR; INTRAVENOUS AS NEEDED
Status: CANCELLED
Start: 2020-12-31

## 2020-12-17 RX ORDER — ALBUTEROL SULFATE 0.83 MG/ML
2.5 SOLUTION RESPIRATORY (INHALATION) AS NEEDED
Status: CANCELLED
Start: 2020-12-31

## 2020-12-17 RX ORDER — HEPARIN 100 UNIT/ML
300-500 SYRINGE INTRAVENOUS AS NEEDED
Status: CANCELLED
Start: 2020-12-31

## 2020-12-17 RX ORDER — SODIUM CHLORIDE 9 MG/ML
10 INJECTION INTRAMUSCULAR; INTRAVENOUS; SUBCUTANEOUS AS NEEDED
Status: CANCELLED | OUTPATIENT
Start: 2020-12-31

## 2020-12-17 RX ORDER — EPINEPHRINE 1 MG/ML
0.3 INJECTION, SOLUTION, CONCENTRATE INTRAVENOUS AS NEEDED
Status: CANCELLED | OUTPATIENT
Start: 2020-12-31

## 2020-12-17 RX ORDER — ACETAMINOPHEN 325 MG/1
650 TABLET ORAL ONCE
Status: ACTIVE | OUTPATIENT
Start: 2020-12-17 | End: 2020-12-17

## 2020-12-17 RX ORDER — DIPHENHYDRAMINE HCL 25 MG
25 CAPSULE ORAL ONCE
Status: ACTIVE | OUTPATIENT
Start: 2020-12-17 | End: 2020-12-17

## 2020-12-17 RX ADMIN — SODIUM CHLORIDE 25 ML/HR: 9 INJECTION, SOLUTION INTRAVENOUS at 08:30

## 2020-12-17 RX ADMIN — IMMUNE GLOBULIN INTRAVENOUS (HUMAN) 10 G: 5 INJECTION, SOLUTION INTRAVENOUS at 10:25

## 2020-12-17 RX ADMIN — Medication 10 ML: at 08:25

## 2020-12-17 RX ADMIN — IMMUNE GLOBULIN INTRAVENOUS (HUMAN) 20 G: 5 INJECTION, SOLUTION INTRAVENOUS at 08:35

## 2020-12-17 NOTE — PROGRESS NOTES
[facility] Progress Note    Date: 2020    Name: Nancy Sawyer    MRN: [id number]         : 1950      Ms. Shirley Ocampo arrived in the [facility] today, at 0800, in stable condition, here for Q 4 Week,  IVIG Infusion. She was assessed and education was provided. Ms. Cristina Mckee vitals were reviewed. Visit Vitals  /81 (BP 1 Location: Left arm, BP Patient Position: Sitting)   Pulse 82   Temp 97 °F (36.1 °C)   Resp 16   Ht 5' 4\" (1.626 m)   Wt 68.3 kg (150 lb 9.6 oz)   SpO2 99%   Breastfeeding No   BMI 25.85 kg/m²         IgG level is ordered to be drawn every 6 months (was last drawn on 20, so due today.)    PIV # 25 G was established in her left AC at 0825 without incident, and blood was drawn for ordered lab ( IgG). The blood was sent out by  to the [facility] lab for processing. Ms. Irineo Amaro declined to take both the ordered pre-medications (Tylenol 650 mg PO & Benadryl 25 mg PO). She stated that she took Tylenol 1,000 mg PO at home this AM at around 0730. But, she stated that she just didn't want the Benadryl.  ml IV Bag was hung to infuse @ KVO PRN throughout treatment today. IVIG 30 grams (Immune Globulin 10 %) (Flebogamma) , was administered IV, per order, and without incident, over approximately 2 hours & 20 minutes. The IVIG was infused with the following rate titration: 33 ml/hr X 30 minutes, 66 ml/hr X 30 minutes, 132 ml/hr X 30 minutes, & 264 ml/hr until completion. After completion of the IVIG, the PIV was flushed very well per protocol with NS, and then, the PIV was removed and gauze/coban was applied. Ms. Shirley Ocampo tolerated well, and had no complaints. Ms. Shirley Ocampo was discharged from [place] [address] in stable condition at 1110. Conchetta Peaks She is to return in 4 weeks, on Thursday, 21, at 0800,  for her next appointment, for her next IVIG Infusion.      Teresa Valladares RN  2020  10:48 AM

## 2020-12-18 LAB — IGG SERPL-MCNC: 829 MG/DL (ref 700–1600)

## 2020-12-31 DIAGNOSIS — D80.1 HYPOGAMMAGLOBULINEMIA (HCC): ICD-10-CM

## 2021-01-14 ENCOUNTER — HOSPITAL ENCOUNTER (OUTPATIENT)
Dept: INFUSION THERAPY | Age: 71
Discharge: HOME | End: 2021-01-14
Payer: MEDICARE

## 2021-01-14 VITALS
SYSTOLIC BLOOD PRESSURE: 143 MMHG | HEIGHT: 64 IN | DIASTOLIC BLOOD PRESSURE: 78 MMHG | BODY MASS INDEX: 25.73 KG/M2 | OXYGEN SATURATION: 99 % | HEART RATE: 78 BPM | RESPIRATION RATE: 16 BRPM | WEIGHT: 150.7 LBS | TEMPERATURE: 97.2 F

## 2021-01-14 DIAGNOSIS — D80.1 HYPOGAMMAGLOBULINEMIA (HCC): Primary | ICD-10-CM

## 2021-01-14 PROCEDURE — 74011250637 HC RX REV CODE- 250/637: Performed by: ALLERGY & IMMUNOLOGY

## 2021-01-14 PROCEDURE — 74011250636 HC RX REV CODE- 250/636

## 2021-01-14 PROCEDURE — 96366 THER/PROPH/DIAG IV INF ADDON: CPT

## 2021-01-14 PROCEDURE — 96365 THER/PROPH/DIAG IV INF INIT: CPT

## 2021-01-14 RX ORDER — DIPHENHYDRAMINE HCL 25 MG
25 CAPSULE ORAL ONCE
Status: CANCELLED
Start: 2021-01-28 | End: 2021-01-28

## 2021-01-14 RX ORDER — DIPHENHYDRAMINE HYDROCHLORIDE 50 MG/ML
50 INJECTION, SOLUTION INTRAMUSCULAR; INTRAVENOUS AS NEEDED
Status: CANCELLED
Start: 2021-01-28

## 2021-01-14 RX ORDER — SODIUM CHLORIDE 9 MG/ML
10 INJECTION INTRAMUSCULAR; INTRAVENOUS; SUBCUTANEOUS AS NEEDED
Status: CANCELLED | OUTPATIENT
Start: 2021-01-28

## 2021-01-14 RX ORDER — SODIUM CHLORIDE 9 MG/ML
25 INJECTION, SOLUTION INTRAVENOUS CONTINUOUS
Status: DISPENSED | OUTPATIENT
Start: 2021-01-14 | End: 2021-01-14

## 2021-01-14 RX ORDER — EPINEPHRINE 1 MG/ML
0.3 INJECTION, SOLUTION, CONCENTRATE INTRAVENOUS AS NEEDED
Status: CANCELLED | OUTPATIENT
Start: 2021-01-28

## 2021-01-14 RX ORDER — ACETAMINOPHEN 325 MG/1
650 TABLET ORAL AS NEEDED
Status: CANCELLED
Start: 2021-01-28

## 2021-01-14 RX ORDER — ACETAMINOPHEN 325 MG/1
650 TABLET ORAL ONCE
Status: COMPLETED | OUTPATIENT
Start: 2021-01-14 | End: 2021-01-14

## 2021-01-14 RX ORDER — HEPARIN 100 UNIT/ML
300-500 SYRINGE INTRAVENOUS AS NEEDED
Status: CANCELLED
Start: 2021-01-28

## 2021-01-14 RX ORDER — HYDROCORTISONE SODIUM SUCCINATE 100 MG/2ML
100 INJECTION, POWDER, FOR SOLUTION INTRAMUSCULAR; INTRAVENOUS AS NEEDED
Status: CANCELLED | OUTPATIENT
Start: 2021-01-28

## 2021-01-14 RX ORDER — ACETAMINOPHEN 325 MG/1
650 TABLET ORAL ONCE
Status: CANCELLED
Start: 2021-01-28 | End: 2021-01-28

## 2021-01-14 RX ORDER — ALBUTEROL SULFATE 0.83 MG/ML
2.5 SOLUTION RESPIRATORY (INHALATION) AS NEEDED
Status: CANCELLED
Start: 2021-01-28

## 2021-01-14 RX ORDER — DIPHENHYDRAMINE HYDROCHLORIDE 50 MG/ML
25 INJECTION, SOLUTION INTRAMUSCULAR; INTRAVENOUS AS NEEDED
Status: CANCELLED
Start: 2021-01-28

## 2021-01-14 RX ORDER — DIPHENHYDRAMINE HCL 25 MG
25 CAPSULE ORAL ONCE
Status: ACTIVE | OUTPATIENT
Start: 2021-01-14 | End: 2021-01-14

## 2021-01-14 RX ORDER — ONDANSETRON 2 MG/ML
8 INJECTION INTRAMUSCULAR; INTRAVENOUS AS NEEDED
Status: CANCELLED | OUTPATIENT
Start: 2021-01-28

## 2021-01-14 RX ORDER — SODIUM CHLORIDE 0.9 % (FLUSH) 0.9 %
10 SYRINGE (ML) INJECTION AS NEEDED
Status: CANCELLED
Start: 2021-01-28

## 2021-01-14 RX ORDER — SODIUM CHLORIDE 9 MG/ML
25 INJECTION, SOLUTION INTRAVENOUS CONTINUOUS
Status: CANCELLED | OUTPATIENT
Start: 2021-01-28

## 2021-01-14 RX ADMIN — IMMUNE GLOBULIN INTRAVENOUS (HUMAN) 20 G: 5 INJECTION, SOLUTION INTRAVENOUS at 08:34

## 2021-01-14 RX ADMIN — ACETAMINOPHEN 650 MG: 325 TABLET ORAL at 08:27

## 2021-01-14 RX ADMIN — IMMUNE GLOBULIN INTRAVENOUS (HUMAN): 5 INJECTION, SOLUTION INTRAVENOUS at 10:47

## 2021-01-14 NOTE — PROGRESS NOTES
[address] [facility] Progress Note    Date: 2021    Name: James Layton    MRN: [id number]         : 1950      Ms. Ana Lilia Goldsmith arrived to [facility] at 0800    Pt is here for Q 4 Week IVIG Infusion. Ms. Ana Lilia Goldsmith was assessed and education was provided. Ms. Omari Angel vitals were reviewed. Visit Vitals  BP (!) 146/83 (BP 1 Location: Right arm, BP Patient Position: Sitting)   Pulse 89   Temp 97.6 °F (36.4 °C)   Resp 16   Ht 5' 4\" (1.626 m)   Wt 68.4 kg (150 lb 11.2 oz)   SpO2 99%   Breastfeeding No   BMI 25.87 kg/m²       Tylenol 650mg administered as ordered for pre-medication. Pt politely refused Benadryl. 224 IV inserted in patient's right AC x1 attempt. Brisk blood return/ flushes without difficulty. IVIG 30 grams (Flebogamma 10%) administered as ordered with the following rate titrations: 33 ml/hr x 30 minutes, 66 ml/hr x 30 minutes, 132 ml/hr x 30 minutes, and 264 ml/hr until completion. Pt's VS remained stable throughout infusion and pt denied complaints. Pt's IV flushed w/ NS. Removed/ intact. Gauze/ coban to site. Pt's armband removed & shredded. Ms. Ana Lilia Goldsmith was discharged from [place] [address] in stable condition at 1110. She is to return on 21 at 0800 for her next appointment.      Jarrod Newby RN  2021

## 2021-01-15 ENCOUNTER — HOSPITAL ENCOUNTER (OUTPATIENT)
Dept: LAB | Age: 71
Discharge: HOME | End: 2021-01-15
Payer: MEDICARE

## 2021-01-15 ENCOUNTER — TRANSCRIBE ORDER (OUTPATIENT)
Dept: REGISTRATION | Age: 71
End: 2021-01-15

## 2021-01-15 ENCOUNTER — HOSPITAL ENCOUNTER (OUTPATIENT)
Dept: GENERAL RADIOLOGY | Age: 71
Discharge: HOME | End: 2021-01-15
Payer: MEDICARE

## 2021-01-15 DIAGNOSIS — Z01.811 PRE-OP CHEST EXAM: ICD-10-CM

## 2021-01-15 DIAGNOSIS — Z01.818 PREOP EXAMINATION: Primary | ICD-10-CM

## 2021-01-15 DIAGNOSIS — Z01.818 PREOP EXAMINATION: ICD-10-CM

## 2021-01-15 LAB
ANION GAP SERPL CALC-SCNC: 4 MMOL/L (ref 3–18)
BUN SERPL-MCNC: 17 MG/DL (ref 7–18)
BUN/CREAT SERPL: 19 (ref 12–20)
CALCIUM SERPL-MCNC: 9.2 MG/DL (ref 8.5–10.1)
CHLORIDE SERPL-SCNC: 105 MMOL/L (ref 100–111)
CO2 SERPL-SCNC: 30 MMOL/L (ref 21–32)
CREAT SERPL-MCNC: 0.89 MG/DL (ref 0.6–1.3)
ERYTHROCYTE [DISTWIDTH] IN BLOOD BY AUTOMATED COUNT: 14.5 % (ref 11.6–14.5)
GLUCOSE SERPL-MCNC: 131 MG/DL (ref 74–99)
HCT VFR BLD AUTO: 38.5 % (ref 35–45)
HGB BLD-MCNC: 12.6 G/DL (ref 12–16)
MCH RBC QN AUTO: 30.5 PG (ref 24–34)
MCHC RBC AUTO-ENTMCNC: 32.7 G/DL (ref 31–37)
MCV RBC AUTO: 93.2 FL (ref 74–97)
PLATELET # BLD AUTO: 279 K/UL (ref 135–420)
PMV BLD AUTO: 9.6 FL (ref 9.2–11.8)
POTASSIUM SERPL-SCNC: 4.7 MMOL/L (ref 3.5–5.5)
RBC # BLD AUTO: 4.13 M/UL (ref 4.2–5.3)
SODIUM SERPL-SCNC: 139 MMOL/L (ref 136–145)
WBC # BLD AUTO: 5.6 K/UL (ref 4.6–13.2)

## 2021-01-15 PROCEDURE — 36415 COLL VENOUS BLD VENIPUNCTURE: CPT

## 2021-01-15 PROCEDURE — 93005 ELECTROCARDIOGRAM TRACING: CPT

## 2021-01-15 PROCEDURE — 85027 COMPLETE CBC AUTOMATED: CPT

## 2021-01-15 PROCEDURE — 80048 BASIC METABOLIC PNL TOTAL CA: CPT

## 2021-01-15 PROCEDURE — 71046 X-RAY EXAM CHEST 2 VIEWS: CPT

## 2021-01-17 LAB
ATRIAL RATE: 88 BPM
CALCULATED P AXIS, ECG09: 56 DEGREES
CALCULATED R AXIS, ECG10: 17 DEGREES
CALCULATED T AXIS, ECG11: 33 DEGREES
DIAGNOSIS, 93000: NORMAL
P-R INTERVAL, ECG05: 150 MS
Q-T INTERVAL, ECG07: 352 MS
QRS DURATION, ECG06: 100 MS
QTC CALCULATION (BEZET), ECG08: 425 MS
VENTRICULAR RATE, ECG03: 88 BPM

## 2021-01-27 ENCOUNTER — HOSPITAL ENCOUNTER (OUTPATIENT)
Dept: LAB | Age: 71
Discharge: HOME | End: 2021-01-27
Payer: MEDICARE

## 2021-01-27 PROCEDURE — 88305 TISSUE EXAM BY PATHOLOGIST: CPT

## 2021-01-28 DIAGNOSIS — D80.1 HYPOGAMMAGLOBULINEMIA (HCC): ICD-10-CM

## 2021-02-11 ENCOUNTER — HOSPITAL ENCOUNTER (OUTPATIENT)
Dept: INFUSION THERAPY | Age: 71
Discharge: HOME | End: 2021-02-11
Payer: MEDICARE

## 2021-02-11 VITALS
DIASTOLIC BLOOD PRESSURE: 80 MMHG | OXYGEN SATURATION: 99 % | WEIGHT: 148.6 LBS | RESPIRATION RATE: 16 BRPM | HEIGHT: 64 IN | SYSTOLIC BLOOD PRESSURE: 144 MMHG | BODY MASS INDEX: 25.37 KG/M2 | TEMPERATURE: 97.3 F | HEART RATE: 81 BPM

## 2021-02-11 DIAGNOSIS — D80.1 HYPOGAMMAGLOBULINEMIA (HCC): Primary | ICD-10-CM

## 2021-02-11 PROCEDURE — 74011250637 HC RX REV CODE- 250/637: Performed by: ALLERGY & IMMUNOLOGY

## 2021-02-11 PROCEDURE — 74011250636 HC RX REV CODE- 250/636: Performed by: ALLERGY & IMMUNOLOGY

## 2021-02-11 PROCEDURE — 96366 THER/PROPH/DIAG IV INF ADDON: CPT

## 2021-02-11 PROCEDURE — 74011250636 HC RX REV CODE- 250/636

## 2021-02-11 PROCEDURE — 96365 THER/PROPH/DIAG IV INF INIT: CPT

## 2021-02-11 RX ORDER — DIPHENHYDRAMINE HCL 25 MG
25 CAPSULE ORAL ONCE
Status: ACTIVE | OUTPATIENT
Start: 2021-02-11 | End: 2021-02-11

## 2021-02-11 RX ORDER — EPINEPHRINE 1 MG/ML
0.3 INJECTION, SOLUTION, CONCENTRATE INTRAVENOUS AS NEEDED
Status: CANCELLED | OUTPATIENT
Start: 2021-02-25

## 2021-02-11 RX ORDER — SODIUM CHLORIDE 9 MG/ML
25 INJECTION, SOLUTION INTRAVENOUS CONTINUOUS
Status: DISPENSED | OUTPATIENT
Start: 2021-02-11 | End: 2021-02-11

## 2021-02-11 RX ORDER — SODIUM CHLORIDE 0.9 % (FLUSH) 0.9 %
10 SYRINGE (ML) INJECTION AS NEEDED
Status: CANCELLED
Start: 2021-02-25

## 2021-02-11 RX ORDER — DIPHENHYDRAMINE HYDROCHLORIDE 50 MG/ML
50 INJECTION, SOLUTION INTRAMUSCULAR; INTRAVENOUS AS NEEDED
Status: CANCELLED
Start: 2021-02-25

## 2021-02-11 RX ORDER — ACETAMINOPHEN 325 MG/1
650 TABLET ORAL ONCE
Status: COMPLETED | OUTPATIENT
Start: 2021-02-11 | End: 2021-02-11

## 2021-02-11 RX ORDER — HEPARIN 100 UNIT/ML
300-500 SYRINGE INTRAVENOUS AS NEEDED
Status: CANCELLED
Start: 2021-02-25

## 2021-02-11 RX ORDER — ONDANSETRON 2 MG/ML
8 INJECTION INTRAMUSCULAR; INTRAVENOUS AS NEEDED
Status: CANCELLED | OUTPATIENT
Start: 2021-02-25

## 2021-02-11 RX ORDER — ALBUTEROL SULFATE 0.83 MG/ML
2.5 SOLUTION RESPIRATORY (INHALATION) AS NEEDED
Status: CANCELLED
Start: 2021-02-25

## 2021-02-11 RX ORDER — ACETAMINOPHEN 325 MG/1
650 TABLET ORAL ONCE
Status: CANCELLED
Start: 2021-02-25 | End: 2021-02-25

## 2021-02-11 RX ORDER — DIPHENHYDRAMINE HYDROCHLORIDE 50 MG/ML
25 INJECTION, SOLUTION INTRAMUSCULAR; INTRAVENOUS AS NEEDED
Status: CANCELLED
Start: 2021-02-25

## 2021-02-11 RX ORDER — SODIUM CHLORIDE 9 MG/ML
25 INJECTION, SOLUTION INTRAVENOUS CONTINUOUS
Status: CANCELLED | OUTPATIENT
Start: 2021-02-25

## 2021-02-11 RX ORDER — ACETAMINOPHEN 325 MG/1
650 TABLET ORAL AS NEEDED
Status: CANCELLED
Start: 2021-02-25

## 2021-02-11 RX ORDER — HYDROCORTISONE SODIUM SUCCINATE 100 MG/2ML
100 INJECTION, POWDER, FOR SOLUTION INTRAMUSCULAR; INTRAVENOUS AS NEEDED
Status: CANCELLED | OUTPATIENT
Start: 2021-02-25

## 2021-02-11 RX ORDER — DIPHENHYDRAMINE HCL 25 MG
25 CAPSULE ORAL ONCE
Status: CANCELLED
Start: 2021-02-25 | End: 2021-02-25

## 2021-02-11 RX ORDER — SODIUM CHLORIDE 9 MG/ML
10 INJECTION INTRAMUSCULAR; INTRAVENOUS; SUBCUTANEOUS AS NEEDED
Status: CANCELLED | OUTPATIENT
Start: 2021-02-25

## 2021-02-11 RX ADMIN — ACETAMINOPHEN 650 MG: 325 TABLET ORAL at 08:14

## 2021-02-11 RX ADMIN — IMMUNE GLOBULIN INTRAVENOUS (HUMAN) 10 G: 5 INJECTION, SOLUTION INTRAVENOUS at 10:31

## 2021-02-11 RX ADMIN — SODIUM CHLORIDE 25 ML/HR: 900 INJECTION, SOLUTION INTRAVENOUS at 08:21

## 2021-02-11 RX ADMIN — IMMUNE GLOBULIN INTRAVENOUS (HUMAN) 20 G: 5 INJECTION, SOLUTION INTRAVENOUS at 08:35

## 2021-02-11 NOTE — PROGRESS NOTES
[facility] Progress Note    Date: 2021    Name: Sanna Hawk    MRN: [id number]         : 1950      Ms. Denver Crosser arrived to [facility] at 0800    Pt is here for Q 4 Week IVIG Infusion. Ms. Denver Crosser was assessed and education was provided. Ms. Rene Burns vitals were reviewed. Visit Vitals  /82 (BP 1 Location: Right arm, BP Patient Position: Sitting)   Pulse 88   Temp 97.2 °F (36.2 °C)   Resp 16   Ht 5' 4\" (1.626 m)   Wt 67.4 kg (148 lb 9.6 oz)   SpO2 100%   BMI 25.51 kg/m²     Tylenol 650mg administered as ordered for pre-medication. Pt politely refused Benadryl. 24 IV inserted in patient's left AC x 2 attempts. Brisk blood return/ flushes without difficulty. IVIG 30 grams (Flebogamma 10%) administered as ordered with the following rate titrations: 33 ml/hr x 30 minutes, 66 ml/hr x 30 minutes, 132 ml/hr x 30 minutes, and 264 ml/hr until completion. Pt's VS remained stable throughout infusion and pt denied complaints. Pt's IV flushed w/ NS. Removed/ intact. Gauze/ coban to site. Pt's armband removed & shredded. Ms. Denver Crosser was discharged from [place] [address] in stable condition at 1110. She is to return on 3/11/21 at 0800 for her next appointment.      Luis Lynn RN  2021

## 2021-02-25 DIAGNOSIS — D80.1 HYPOGAMMAGLOBULINEMIA (HCC): ICD-10-CM

## 2021-03-11 ENCOUNTER — HOSPITAL ENCOUNTER (OUTPATIENT)
Dept: INFUSION THERAPY | Age: 71
Discharge: HOME | End: 2021-03-11
Payer: MEDICARE

## 2021-03-11 VITALS
RESPIRATION RATE: 16 BRPM | TEMPERATURE: 98.2 F | OXYGEN SATURATION: 99 % | SYSTOLIC BLOOD PRESSURE: 132 MMHG | DIASTOLIC BLOOD PRESSURE: 78 MMHG | HEART RATE: 81 BPM

## 2021-03-11 DIAGNOSIS — D80.1 HYPOGAMMAGLOBULINEMIA (HCC): Primary | ICD-10-CM

## 2021-03-11 PROCEDURE — 96365 THER/PROPH/DIAG IV INF INIT: CPT

## 2021-03-11 PROCEDURE — 74011250636 HC RX REV CODE- 250/636

## 2021-03-11 PROCEDURE — 96366 THER/PROPH/DIAG IV INF ADDON: CPT

## 2021-03-11 PROCEDURE — 74011250637 HC RX REV CODE- 250/637: Performed by: ALLERGY & IMMUNOLOGY

## 2021-03-11 RX ORDER — EPINEPHRINE 1 MG/ML
0.3 INJECTION, SOLUTION, CONCENTRATE INTRAVENOUS AS NEEDED
Status: CANCELLED | OUTPATIENT
Start: 2021-03-25

## 2021-03-11 RX ORDER — ALBUTEROL SULFATE 0.83 MG/ML
2.5 SOLUTION RESPIRATORY (INHALATION) AS NEEDED
Status: CANCELLED
Start: 2021-03-25

## 2021-03-11 RX ORDER — DIPHENHYDRAMINE HCL 25 MG
25 CAPSULE ORAL ONCE
Status: CANCELLED
Start: 2021-03-25 | End: 2021-03-25

## 2021-03-11 RX ORDER — ACETAMINOPHEN 325 MG/1
650 TABLET ORAL AS NEEDED
Status: CANCELLED
Start: 2021-03-25

## 2021-03-11 RX ORDER — ONDANSETRON 2 MG/ML
8 INJECTION INTRAMUSCULAR; INTRAVENOUS AS NEEDED
Status: CANCELLED | OUTPATIENT
Start: 2021-03-25

## 2021-03-11 RX ORDER — ACETAMINOPHEN 325 MG/1
650 TABLET ORAL ONCE
Status: COMPLETED | OUTPATIENT
Start: 2021-03-11 | End: 2021-03-11

## 2021-03-11 RX ORDER — ACETAMINOPHEN 325 MG/1
650 TABLET ORAL ONCE
Status: CANCELLED
Start: 2021-03-25 | End: 2021-03-25

## 2021-03-11 RX ORDER — DIPHENHYDRAMINE HYDROCHLORIDE 50 MG/ML
25 INJECTION, SOLUTION INTRAMUSCULAR; INTRAVENOUS AS NEEDED
Status: CANCELLED
Start: 2021-03-25

## 2021-03-11 RX ORDER — HEPARIN 100 UNIT/ML
300-500 SYRINGE INTRAVENOUS AS NEEDED
Status: CANCELLED
Start: 2021-03-25

## 2021-03-11 RX ORDER — SODIUM CHLORIDE 0.9 % (FLUSH) 0.9 %
10 SYRINGE (ML) INJECTION AS NEEDED
Status: DISPENSED | OUTPATIENT
Start: 2021-03-11 | End: 2021-03-11

## 2021-03-11 RX ORDER — SODIUM CHLORIDE 9 MG/ML
25 INJECTION, SOLUTION INTRAVENOUS CONTINUOUS
Status: DISPENSED | OUTPATIENT
Start: 2021-03-11 | End: 2021-03-11

## 2021-03-11 RX ORDER — DIPHENHYDRAMINE HYDROCHLORIDE 50 MG/ML
50 INJECTION, SOLUTION INTRAMUSCULAR; INTRAVENOUS AS NEEDED
Status: CANCELLED
Start: 2021-03-25

## 2021-03-11 RX ORDER — DIPHENHYDRAMINE HCL 25 MG
25 CAPSULE ORAL ONCE
Status: ACTIVE | OUTPATIENT
Start: 2021-03-11 | End: 2021-03-11

## 2021-03-11 RX ORDER — SODIUM CHLORIDE 9 MG/ML
25 INJECTION, SOLUTION INTRAVENOUS CONTINUOUS
Status: CANCELLED | OUTPATIENT
Start: 2021-03-25

## 2021-03-11 RX ORDER — HYDROCORTISONE SODIUM SUCCINATE 100 MG/2ML
100 INJECTION, POWDER, FOR SOLUTION INTRAMUSCULAR; INTRAVENOUS AS NEEDED
Status: CANCELLED | OUTPATIENT
Start: 2021-03-25

## 2021-03-11 RX ORDER — SODIUM CHLORIDE 0.9 % (FLUSH) 0.9 %
10 SYRINGE (ML) INJECTION AS NEEDED
Status: CANCELLED
Start: 2021-03-25

## 2021-03-11 RX ORDER — SODIUM CHLORIDE 9 MG/ML
10 INJECTION INTRAMUSCULAR; INTRAVENOUS; SUBCUTANEOUS AS NEEDED
Status: CANCELLED | OUTPATIENT
Start: 2021-03-25

## 2021-03-11 RX ADMIN — IMMUNE GLOBULIN INTRAVENOUS (HUMAN) 10 G: 5 INJECTION, SOLUTION INTRAVENOUS at 10:34

## 2021-03-11 RX ADMIN — Medication 10 ML: at 11:01

## 2021-03-11 RX ADMIN — ACETAMINOPHEN 650 MG: 325 TABLET ORAL at 08:23

## 2021-03-11 RX ADMIN — Medication 10 ML: at 08:20

## 2021-03-11 RX ADMIN — IMMUNE GLOBULIN INTRAVENOUS (HUMAN) 20 G: 5 INJECTION, SOLUTION INTRAVENOUS at 08:40

## 2021-03-11 NOTE — PROGRESS NOTES
[facility] Progress Note    Date: 2021    Name: Catalan     MRN: [id number]         : 1950      Ms. Fernando Molnia arrived to [facility] at 6716. Pt is here for Q 4 Week IVIG Infusion. Ms. Fernando Molina was assessed and education was provided. Ms. Heber Langford vitals were reviewed. Visit Vitals  /80 (BP 1 Location: Right arm, BP Patient Position: Sitting)   Pulse 94   Temp 98 °F (36.7 °C)   Resp 16   SpO2 96%   Breastfeeding No       Tylenol 650mg administered as ordered for pre-medication. Pt politely refused Benadryl.    22g IV inserted in patient's right AC x1 attempt. Brisk blood return/ flushes without difficulty. IVIG 30 grams (Flebogamma 10%) administered as ordered with the following rate titrations: 33 ml/hr x 30 minutes, 66 ml/hr x 30 minutes, 132 ml/hr x 30 minutes, and 264 ml/hr until completion. Pt's VS remained stable throughout infusion and pt denied complaints. Pt's IV flushed w/ NS. Removed/ intact. Gauze/ coban to site. Discharge/ follow-up instructions discussed w/ pt. Pt verbalized understanding. Pt's armband removed & shredded. Ms. Fernando Molina was discharged from [place] [address] in stable condition at . She is to return on 4/15/21 at 0800 for her next appointment.      Beka Hodges RN  2021

## 2021-03-25 DIAGNOSIS — D80.1 HYPOGAMMAGLOBULINEMIA (HCC): ICD-10-CM

## 2021-04-08 ENCOUNTER — APPOINTMENT (OUTPATIENT)
Dept: INFUSION THERAPY | Age: 71
End: 2021-04-08
Payer: MEDICARE

## 2021-04-13 RX ORDER — ONDANSETRON 2 MG/ML
8 INJECTION INTRAMUSCULAR; INTRAVENOUS AS NEEDED
Status: CANCELLED | OUTPATIENT
Start: 2021-04-15

## 2021-04-13 RX ORDER — SODIUM CHLORIDE 9 MG/ML
25 INJECTION, SOLUTION INTRAVENOUS CONTINUOUS
Status: CANCELLED | OUTPATIENT
Start: 2021-04-15

## 2021-04-13 RX ORDER — SODIUM CHLORIDE 9 MG/ML
10 INJECTION INTRAMUSCULAR; INTRAVENOUS; SUBCUTANEOUS AS NEEDED
Status: CANCELLED | OUTPATIENT
Start: 2021-04-15

## 2021-04-13 RX ORDER — DIPHENHYDRAMINE HYDROCHLORIDE 50 MG/ML
25 INJECTION, SOLUTION INTRAMUSCULAR; INTRAVENOUS AS NEEDED
Status: CANCELLED
Start: 2021-04-15

## 2021-04-13 RX ORDER — ACETAMINOPHEN 325 MG/1
650 TABLET ORAL AS NEEDED
Status: CANCELLED
Start: 2021-04-15

## 2021-04-13 RX ORDER — HYDROCORTISONE SODIUM SUCCINATE 100 MG/2ML
100 INJECTION, POWDER, FOR SOLUTION INTRAMUSCULAR; INTRAVENOUS AS NEEDED
Status: CANCELLED | OUTPATIENT
Start: 2021-04-15

## 2021-04-13 RX ORDER — SODIUM CHLORIDE 0.9 % (FLUSH) 0.9 %
10 SYRINGE (ML) INJECTION AS NEEDED
Status: CANCELLED | OUTPATIENT
Start: 2021-04-15

## 2021-04-13 RX ORDER — DIPHENHYDRAMINE HCL 25 MG
25 CAPSULE ORAL ONCE
Status: CANCELLED
Start: 2021-04-15 | End: 2021-04-15

## 2021-04-13 RX ORDER — ACETAMINOPHEN 325 MG/1
650 TABLET ORAL ONCE
Status: CANCELLED
Start: 2021-04-15 | End: 2021-04-15

## 2021-04-13 RX ORDER — ALBUTEROL SULFATE 0.83 MG/ML
2.5 SOLUTION RESPIRATORY (INHALATION) AS NEEDED
Status: CANCELLED
Start: 2021-04-15

## 2021-04-13 RX ORDER — EPINEPHRINE 1 MG/ML
0.3 INJECTION, SOLUTION, CONCENTRATE INTRAVENOUS AS NEEDED
Status: CANCELLED | OUTPATIENT
Start: 2021-04-15

## 2021-04-13 RX ORDER — HEPARIN 100 UNIT/ML
300-500 SYRINGE INTRAVENOUS AS NEEDED
Status: CANCELLED
Start: 2021-04-15

## 2021-04-13 RX ORDER — DIPHENHYDRAMINE HYDROCHLORIDE 50 MG/ML
50 INJECTION, SOLUTION INTRAMUSCULAR; INTRAVENOUS AS NEEDED
Status: CANCELLED
Start: 2021-04-15

## 2021-04-15 ENCOUNTER — HOSPITAL ENCOUNTER (OUTPATIENT)
Dept: INFUSION THERAPY | Age: 71
End: 2021-04-15

## 2021-04-22 ENCOUNTER — HOSPITAL ENCOUNTER (OUTPATIENT)
Dept: INFUSION THERAPY | Age: 71
Discharge: HOME | End: 2021-04-22
Payer: MEDICARE

## 2021-04-22 VITALS
OXYGEN SATURATION: 100 % | WEIGHT: 152.8 LBS | DIASTOLIC BLOOD PRESSURE: 74 MMHG | HEART RATE: 77 BPM | TEMPERATURE: 97.8 F | BODY MASS INDEX: 26.23 KG/M2 | SYSTOLIC BLOOD PRESSURE: 129 MMHG | RESPIRATION RATE: 16 BRPM

## 2021-04-22 DIAGNOSIS — D80.1 HYPOGAMMAGLOBULINEMIA (HCC): Primary | ICD-10-CM

## 2021-04-22 PROCEDURE — 96366 THER/PROPH/DIAG IV INF ADDON: CPT

## 2021-04-22 PROCEDURE — 74011250636 HC RX REV CODE- 250/636: Performed by: ALLERGY & IMMUNOLOGY

## 2021-04-22 PROCEDURE — 74011250637 HC RX REV CODE- 250/637: Performed by: ALLERGY & IMMUNOLOGY

## 2021-04-22 PROCEDURE — 96365 THER/PROPH/DIAG IV INF INIT: CPT

## 2021-04-22 PROCEDURE — 82784 ASSAY IGA/IGD/IGG/IGM EACH: CPT

## 2021-04-22 RX ORDER — ONDANSETRON 2 MG/ML
8 INJECTION INTRAMUSCULAR; INTRAVENOUS AS NEEDED
Status: CANCELLED | OUTPATIENT
Start: 2021-04-29

## 2021-04-22 RX ORDER — SODIUM CHLORIDE 9 MG/ML
25 INJECTION, SOLUTION INTRAVENOUS CONTINUOUS
Status: CANCELLED | OUTPATIENT
Start: 2021-04-29

## 2021-04-22 RX ORDER — SODIUM CHLORIDE 0.9 % (FLUSH) 0.9 %
10 SYRINGE (ML) INJECTION AS NEEDED
Status: CANCELLED | OUTPATIENT
Start: 2021-04-29

## 2021-04-22 RX ORDER — SODIUM CHLORIDE 9 MG/ML
10 INJECTION INTRAMUSCULAR; INTRAVENOUS; SUBCUTANEOUS AS NEEDED
Status: CANCELLED | OUTPATIENT
Start: 2021-04-29

## 2021-04-22 RX ORDER — SODIUM CHLORIDE 0.9 % (FLUSH) 0.9 %
10 SYRINGE (ML) INJECTION AS NEEDED
Status: DISPENSED | OUTPATIENT
Start: 2021-04-22 | End: 2021-04-22

## 2021-04-22 RX ORDER — SODIUM CHLORIDE 9 MG/ML
25 INJECTION, SOLUTION INTRAVENOUS CONTINUOUS
Status: DISPENSED | OUTPATIENT
Start: 2021-04-22 | End: 2021-04-22

## 2021-04-22 RX ORDER — HEPARIN 100 UNIT/ML
300-500 SYRINGE INTRAVENOUS AS NEEDED
Status: CANCELLED
Start: 2021-04-29

## 2021-04-22 RX ORDER — ALBUTEROL SULFATE 0.83 MG/ML
2.5 SOLUTION RESPIRATORY (INHALATION) AS NEEDED
Status: CANCELLED
Start: 2021-04-29

## 2021-04-22 RX ORDER — DIPHENHYDRAMINE HYDROCHLORIDE 50 MG/ML
50 INJECTION, SOLUTION INTRAMUSCULAR; INTRAVENOUS AS NEEDED
Status: CANCELLED
Start: 2021-04-29

## 2021-04-22 RX ORDER — ACETAMINOPHEN 325 MG/1
650 TABLET ORAL ONCE
Status: COMPLETED | OUTPATIENT
Start: 2021-04-22 | End: 2021-04-22

## 2021-04-22 RX ORDER — ACETAMINOPHEN 325 MG/1
650 TABLET ORAL ONCE
Status: CANCELLED
Start: 2021-04-29 | End: 2021-04-29

## 2021-04-22 RX ORDER — DIPHENHYDRAMINE HCL 25 MG
25 CAPSULE ORAL ONCE
Status: CANCELLED
Start: 2021-04-29 | End: 2021-04-29

## 2021-04-22 RX ORDER — EPINEPHRINE 1 MG/ML
0.3 INJECTION, SOLUTION, CONCENTRATE INTRAVENOUS AS NEEDED
Status: CANCELLED | OUTPATIENT
Start: 2021-04-29

## 2021-04-22 RX ORDER — HYDROCORTISONE SODIUM SUCCINATE 100 MG/2ML
100 INJECTION, POWDER, FOR SOLUTION INTRAMUSCULAR; INTRAVENOUS AS NEEDED
Status: CANCELLED | OUTPATIENT
Start: 2021-04-29

## 2021-04-22 RX ORDER — ACETAMINOPHEN 325 MG/1
650 TABLET ORAL AS NEEDED
Status: CANCELLED
Start: 2021-04-29

## 2021-04-22 RX ORDER — DIPHENHYDRAMINE HCL 25 MG
25 CAPSULE ORAL ONCE
Status: ACTIVE | OUTPATIENT
Start: 2021-04-22 | End: 2021-04-22

## 2021-04-22 RX ORDER — DIPHENHYDRAMINE HYDROCHLORIDE 50 MG/ML
25 INJECTION, SOLUTION INTRAMUSCULAR; INTRAVENOUS AS NEEDED
Status: CANCELLED
Start: 2021-04-29

## 2021-04-22 RX ADMIN — Medication 10 ML: at 08:30

## 2021-04-22 RX ADMIN — IMMUNE GLOBULIN (HUMAN) 10 G: 10 INJECTION INTRAVENOUS; SUBCUTANEOUS at 10:25

## 2021-04-22 RX ADMIN — IMMUNE GLOBULIN (HUMAN) 10 G: 10 INJECTION INTRAVENOUS; SUBCUTANEOUS at 11:00

## 2021-04-22 RX ADMIN — SODIUM CHLORIDE 25 ML/HR: 9 INJECTION, SOLUTION INTRAVENOUS at 08:40

## 2021-04-22 RX ADMIN — IMMUNE GLOBULIN (HUMAN) 10 G: 10 INJECTION INTRAVENOUS; SUBCUTANEOUS at 09:00

## 2021-04-22 RX ADMIN — ACETAMINOPHEN 650 MG: 325 TABLET ORAL at 08:22

## 2021-04-22 NOTE — PROGRESS NOTES
[facility] Progress Note    Date: 2021    Name: Domingo Jo    MRN: [id number]         : 1950      Ms. Shiva Coats arrived in the [facility] today, at 0800, in stable condition, here for her IVIG Infusion (Every 4 Weeks). She was assessed and education was provided. Ms. Dav Albarado vitals were reviewed. Visit Vitals  /82 (BP 1 Location: Right upper arm, BP Patient Position: Sitting)   Pulse 85   Temp 97.8 °F (36.6 °C)   Resp 16   Wt 69.3 kg (152 lb 12.8 oz)   SpO2 100%   Breastfeeding No   BMI 26.23 kg/m²             PIV # 25 G was established in her right AC at 0830 without incident, and blood was drawn for ordered lab ( IgG). The blood was sent out by  to the [facility] lab for processing. The following pre-medication was administered per order: TYLENOL 650 mg PO. However, Ms. Gayle Osullivan declined the PO BENADRYL 25 mg.       ml IV Bag was hung to infuse @ KVO PRN throughout treatment today. IVIG 30 grams (Immune Globulin 10 %) (GAMUNEX-C) , was administered IV, per order, and without incident, over approximately 2 hours & 30 minutes. The IVIG was infused with the following rate titration: 33 ml/hr X 30 minutes, 66 ml/hr X 30 minutes, 132 ml/hr X 30 minutes, & 264 ml/hr until completion. After completion of the IVIG, the PIV was flushed very well per protocol with NS, and then, the PIV was removed and gauze/coban was applied. Ms. Shiva Coats tolerated well, and had no complaints. Ms. Shiva Coats was discharged from [place] [address] in stable condition at 1145. Janis Kennedy She is to return in 4 weeks, on Thursday, 21, at 0800,  for her next appointment, for her next IVIG Infusion.      Narcisa Hernandez RN   2021  10:48 AM

## 2021-04-23 LAB — IGG SERPL-MCNC: 624 MG/DL (ref 700–1600)

## 2021-05-13 ENCOUNTER — APPOINTMENT (OUTPATIENT)
Dept: INFUSION THERAPY | Age: 71
End: 2021-05-13
Payer: MEDICARE

## 2021-05-20 ENCOUNTER — HOSPITAL ENCOUNTER (OUTPATIENT)
Dept: INFUSION THERAPY | Age: 71
Discharge: HOME | End: 2021-05-20
Payer: MEDICARE

## 2021-05-20 VITALS
OXYGEN SATURATION: 100 % | RESPIRATION RATE: 16 BRPM | WEIGHT: 148 LBS | TEMPERATURE: 98.1 F | HEART RATE: 83 BPM | BODY MASS INDEX: 25.27 KG/M2 | DIASTOLIC BLOOD PRESSURE: 81 MMHG | HEIGHT: 64 IN | SYSTOLIC BLOOD PRESSURE: 137 MMHG

## 2021-05-20 DIAGNOSIS — D80.1 HYPOGAMMAGLOBULINEMIA (HCC): Primary | ICD-10-CM

## 2021-05-20 PROCEDURE — 74011250636 HC RX REV CODE- 250/636: Performed by: ALLERGY & IMMUNOLOGY

## 2021-05-20 PROCEDURE — 74011250636 HC RX REV CODE- 250/636

## 2021-05-20 PROCEDURE — 96365 THER/PROPH/DIAG IV INF INIT: CPT

## 2021-05-20 PROCEDURE — 96366 THER/PROPH/DIAG IV INF ADDON: CPT

## 2021-05-20 PROCEDURE — 74011250637 HC RX REV CODE- 250/637: Performed by: ALLERGY & IMMUNOLOGY

## 2021-05-20 RX ORDER — ALBUTEROL SULFATE 0.83 MG/ML
2.5 SOLUTION RESPIRATORY (INHALATION) AS NEEDED
Status: CANCELLED
Start: 2021-06-17

## 2021-05-20 RX ORDER — SODIUM CHLORIDE 0.9 % (FLUSH) 0.9 %
10 SYRINGE (ML) INJECTION AS NEEDED
Status: DISPENSED | OUTPATIENT
Start: 2021-05-20 | End: 2021-05-20

## 2021-05-20 RX ORDER — ACETAMINOPHEN 325 MG/1
650 TABLET ORAL ONCE
Status: CANCELLED
Start: 2021-06-17 | End: 2021-06-17

## 2021-05-20 RX ORDER — ACETAMINOPHEN 325 MG/1
650 TABLET ORAL ONCE
Status: COMPLETED | OUTPATIENT
Start: 2021-05-20 | End: 2021-05-20

## 2021-05-20 RX ORDER — SODIUM CHLORIDE 9 MG/ML
10 INJECTION INTRAMUSCULAR; INTRAVENOUS; SUBCUTANEOUS AS NEEDED
Status: CANCELLED | OUTPATIENT
Start: 2021-06-17

## 2021-05-20 RX ORDER — DIPHENHYDRAMINE HCL 25 MG
25 CAPSULE ORAL ONCE
Status: ACTIVE | OUTPATIENT
Start: 2021-05-20 | End: 2021-05-20

## 2021-05-20 RX ORDER — DIPHENHYDRAMINE HYDROCHLORIDE 50 MG/ML
25 INJECTION, SOLUTION INTRAMUSCULAR; INTRAVENOUS AS NEEDED
Status: CANCELLED
Start: 2021-06-17

## 2021-05-20 RX ORDER — SODIUM CHLORIDE 9 MG/ML
25 INJECTION, SOLUTION INTRAVENOUS CONTINUOUS
Status: CANCELLED | OUTPATIENT
Start: 2021-06-17

## 2021-05-20 RX ORDER — ACETAMINOPHEN 325 MG/1
650 TABLET ORAL AS NEEDED
Status: CANCELLED
Start: 2021-06-17

## 2021-05-20 RX ORDER — DIPHENHYDRAMINE HCL 25 MG
25 CAPSULE ORAL ONCE
Status: CANCELLED
Start: 2021-06-17 | End: 2021-06-17

## 2021-05-20 RX ORDER — HEPARIN 100 UNIT/ML
300-500 SYRINGE INTRAVENOUS AS NEEDED
Status: CANCELLED
Start: 2021-06-17

## 2021-05-20 RX ORDER — DIPHENHYDRAMINE HYDROCHLORIDE 50 MG/ML
50 INJECTION, SOLUTION INTRAMUSCULAR; INTRAVENOUS AS NEEDED
Status: CANCELLED
Start: 2021-06-17

## 2021-05-20 RX ORDER — ONDANSETRON 2 MG/ML
8 INJECTION INTRAMUSCULAR; INTRAVENOUS AS NEEDED
Status: CANCELLED | OUTPATIENT
Start: 2021-06-17

## 2021-05-20 RX ORDER — SODIUM CHLORIDE 9 MG/ML
10 INJECTION INTRAMUSCULAR; INTRAVENOUS; SUBCUTANEOUS AS NEEDED
Status: ACTIVE | OUTPATIENT
Start: 2021-05-20 | End: 2021-05-20

## 2021-05-20 RX ORDER — SODIUM CHLORIDE 0.9 % (FLUSH) 0.9 %
10 SYRINGE (ML) INJECTION AS NEEDED
Status: CANCELLED | OUTPATIENT
Start: 2021-06-17

## 2021-05-20 RX ORDER — EPINEPHRINE 1 MG/ML
0.3 INJECTION, SOLUTION, CONCENTRATE INTRAVENOUS AS NEEDED
Status: CANCELLED | OUTPATIENT
Start: 2021-06-17

## 2021-05-20 RX ORDER — HYDROCORTISONE SODIUM SUCCINATE 100 MG/2ML
100 INJECTION, POWDER, FOR SOLUTION INTRAMUSCULAR; INTRAVENOUS AS NEEDED
Status: CANCELLED | OUTPATIENT
Start: 2021-06-17

## 2021-05-20 RX ORDER — SODIUM CHLORIDE 9 MG/ML
25 INJECTION, SOLUTION INTRAVENOUS CONTINUOUS
Status: DISPENSED | OUTPATIENT
Start: 2021-05-20 | End: 2021-05-20

## 2021-05-20 RX ADMIN — IMMUNE GLOBULIN (HUMAN) 10 G: 10 INJECTION INTRAVENOUS; SUBCUTANEOUS at 10:23

## 2021-05-20 RX ADMIN — SODIUM CHLORIDE 10 ML: 9 INJECTION INTRAMUSCULAR; INTRAVENOUS; SUBCUTANEOUS at 08:20

## 2021-05-20 RX ADMIN — ACETAMINOPHEN 650 MG: 325 TABLET ORAL at 08:22

## 2021-05-20 RX ADMIN — IMMUNE GLOBULIN (HUMAN) 20 G: 10 INJECTION INTRAVENOUS; SUBCUTANEOUS at 08:32

## 2021-05-20 RX ADMIN — Medication 10 ML: at 10:52

## 2021-05-20 NOTE — PROGRESS NOTES
[facility] Progress Note    Date: May 20, 2021    Name: Marbin Escobar    MRN: [id number]         : 1950      Ms. Angelito Wasserman arrived to [facility] at [facility]. No complaints or concerns voiced    Pt is here for Q 4 Week IVIG Infusion. Patient declined printed care notes on general information and discharge instructions on gamunex-c. States she is an existing patient for same, tolerates well, and understands treatment plan    Ms. Angelito Wasserman was assessed and education re inforced. Ms. Gilda Dorsey vitals were reviewed. Visit Vitals  /81 (BP 1 Location: Left upper arm, BP Patient Position: At rest)   Pulse 83   Temp 98.1 °F (36.7 °C)   Resp 16   Ht 5' 4\" (1.626 m)   Wt 67.1 kg (148 lb)   SpO2 100%   Breastfeeding No   BMI 25.40 kg/m²       Tylenol 650mg administered as ordered for pre-medication. Pt politely declined Benadryl and normal saline flush bag    22g IV inserted in patient's right AC x1 attempt. Brisk blood return/ flushes without difficulty. IVIG 30 grams (gamunex-c ) administered as ordered with the following rate titrations: 33 ml/hr x 30 minutes, 66 ml/hr x 30 minutes, 132 ml/hr x 30 minutes, and 264 ml/hr until completion. Pt's VS remained stable throughout infusion and pt denied complaints. Patient Vitals for the past 8 hrs:   Temp Pulse Resp BP SpO2   21 1051 98.1 °F (36.7 °C) 83 16 137/81 100 %   21 1032 98.3 °F (36.8 °C) 80 16 138/83    21 1002 98.1 °F (36.7 °C) 80 16 135/80    21 0932 97.8 °F (36.6 °C) 76 16 120/88    21 0902 97.8 °F (36.6 °C) 86 16 119/80    21 0806 97.9 °F (36.6 °C) 89 16 137/81 100 %       Verbally reviewed discharge instructions with patient. She stated understanding. Pt's IV flushed w/ NS. Removed/ intact. Site without bruising, bleeding, swelling or tenderness. Gauze/ coban to site. Pt's armband removed & shredded. Ms. Angelito Wasserman was discharged from [facility] in stable condition at 1055.  She is to return on 6/17/21 at 0800 for her next appointment.         Rell Hallman RN  May 20, 2021

## 2021-06-17 ENCOUNTER — HOSPITAL ENCOUNTER (OUTPATIENT)
Dept: INFUSION THERAPY | Age: 71
Discharge: HOME | End: 2021-06-17
Payer: MEDICARE

## 2021-06-17 VITALS
BODY MASS INDEX: 25.64 KG/M2 | WEIGHT: 150.2 LBS | SYSTOLIC BLOOD PRESSURE: 145 MMHG | OXYGEN SATURATION: 100 % | RESPIRATION RATE: 16 BRPM | TEMPERATURE: 97.8 F | HEIGHT: 64 IN | DIASTOLIC BLOOD PRESSURE: 79 MMHG | HEART RATE: 75 BPM

## 2021-06-17 DIAGNOSIS — D80.1 HYPOGAMMAGLOBULINEMIA (HCC): Primary | ICD-10-CM

## 2021-06-17 PROCEDURE — 96365 THER/PROPH/DIAG IV INF INIT: CPT

## 2021-06-17 PROCEDURE — 74011250636 HC RX REV CODE- 250/636: Performed by: ALLERGY & IMMUNOLOGY

## 2021-06-17 PROCEDURE — 74011250636 HC RX REV CODE- 250/636

## 2021-06-17 PROCEDURE — 96366 THER/PROPH/DIAG IV INF ADDON: CPT

## 2021-06-17 PROCEDURE — 74011250637 HC RX REV CODE- 250/637: Performed by: ALLERGY & IMMUNOLOGY

## 2021-06-17 RX ORDER — DIPHENHYDRAMINE HYDROCHLORIDE 50 MG/ML
25 INJECTION, SOLUTION INTRAMUSCULAR; INTRAVENOUS AS NEEDED
Status: CANCELLED
Start: 2021-07-15

## 2021-06-17 RX ORDER — SODIUM CHLORIDE 9 MG/ML
25 INJECTION, SOLUTION INTRAVENOUS CONTINUOUS
Status: CANCELLED | OUTPATIENT
Start: 2021-07-15

## 2021-06-17 RX ORDER — ACETAMINOPHEN 325 MG/1
650 TABLET ORAL AS NEEDED
Status: CANCELLED
Start: 2021-07-15

## 2021-06-17 RX ORDER — DIPHENHYDRAMINE HYDROCHLORIDE 50 MG/ML
50 INJECTION, SOLUTION INTRAMUSCULAR; INTRAVENOUS AS NEEDED
Status: CANCELLED
Start: 2021-07-15

## 2021-06-17 RX ORDER — ACETAMINOPHEN 325 MG/1
650 TABLET ORAL ONCE
Status: COMPLETED | OUTPATIENT
Start: 2021-06-17 | End: 2021-06-17

## 2021-06-17 RX ORDER — DIPHENHYDRAMINE HCL 25 MG
25 CAPSULE ORAL ONCE
Status: ACTIVE | OUTPATIENT
Start: 2021-06-17 | End: 2021-06-17

## 2021-06-17 RX ORDER — SODIUM CHLORIDE 0.9 % (FLUSH) 0.9 %
10 SYRINGE (ML) INJECTION AS NEEDED
Status: DISPENSED | OUTPATIENT
Start: 2021-06-17 | End: 2021-06-17

## 2021-06-17 RX ORDER — HEPARIN 100 UNIT/ML
300-500 SYRINGE INTRAVENOUS AS NEEDED
Status: CANCELLED
Start: 2021-07-15

## 2021-06-17 RX ORDER — SODIUM CHLORIDE 9 MG/ML
10 INJECTION INTRAMUSCULAR; INTRAVENOUS; SUBCUTANEOUS AS NEEDED
Status: CANCELLED | OUTPATIENT
Start: 2021-07-15

## 2021-06-17 RX ORDER — ALBUTEROL SULFATE 0.83 MG/ML
2.5 SOLUTION RESPIRATORY (INHALATION) AS NEEDED
Status: CANCELLED
Start: 2021-07-15

## 2021-06-17 RX ORDER — HYDROCORTISONE SODIUM SUCCINATE 100 MG/2ML
100 INJECTION, POWDER, FOR SOLUTION INTRAMUSCULAR; INTRAVENOUS AS NEEDED
Status: CANCELLED | OUTPATIENT
Start: 2021-07-15

## 2021-06-17 RX ORDER — DIPHENHYDRAMINE HCL 25 MG
25 CAPSULE ORAL ONCE
Status: CANCELLED
Start: 2021-07-15 | End: 2021-07-15

## 2021-06-17 RX ORDER — EPINEPHRINE 1 MG/ML
0.3 INJECTION, SOLUTION, CONCENTRATE INTRAVENOUS AS NEEDED
Status: CANCELLED | OUTPATIENT
Start: 2021-07-15

## 2021-06-17 RX ORDER — ACETAMINOPHEN 325 MG/1
650 TABLET ORAL ONCE
Status: CANCELLED
Start: 2021-07-15 | End: 2021-07-15

## 2021-06-17 RX ORDER — SODIUM CHLORIDE 0.9 % (FLUSH) 0.9 %
10 SYRINGE (ML) INJECTION AS NEEDED
Status: CANCELLED | OUTPATIENT
Start: 2021-07-15

## 2021-06-17 RX ORDER — ONDANSETRON 2 MG/ML
8 INJECTION INTRAMUSCULAR; INTRAVENOUS AS NEEDED
Status: CANCELLED | OUTPATIENT
Start: 2021-07-15

## 2021-06-17 RX ADMIN — Medication 10 ML: at 10:51

## 2021-06-17 RX ADMIN — IMMUNE GLOBULIN (HUMAN) 10 G: 10 INJECTION INTRAVENOUS; SUBCUTANEOUS at 08:40

## 2021-06-17 RX ADMIN — IMMUNE GLOBULIN (HUMAN) 20 G: 10 INJECTION INTRAVENOUS; SUBCUTANEOUS at 10:05

## 2021-06-17 RX ADMIN — ACETAMINOPHEN 650 MG: 325 TABLET ORAL at 08:30

## 2021-06-17 RX ADMIN — Medication 10 ML: at 08:40

## 2021-06-17 NOTE — PROGRESS NOTES
[facility] Progress Note    Date: 2021    Name: Arnoldo Altman    MRN: [id number]         : 1950      Ms. Huyen Gonzalez arrived to [facility] at [address] [facility]. Pt is here for Q 4 Week IVIG Infusion. Ms. Huyen Gonzalez was assessed and education was provided. Ms. Trung Rodas vitals were reviewed. Visit Vitals  BP (!) 142/81 (BP 1 Location: Right arm, BP Patient Position: Sitting)   Pulse 85   Temp 97.7 °F (36.5 °C)   Resp 16   Ht 5' 4\" (1.626 m)   Wt 68.1 kg (150 lb 3.2 oz)   SpO2 100%   BMI 25.78 kg/m²       Tylenol 650mg administered as ordered for pre-medication. Pt politely refused Benadryl.    22g IV inserted in patient's right AC x1 attempt. Brisk blood return/ flushes without difficulty. IVIG 10% (Gamunex-C) 30 grams administered as ordered with the following rate titrations: 33 ml/hr x 30 minutes, 66 ml/hr x 30 minutes, 132 ml/hr x 30 minutes, and 264 ml/hr until completion. Pt's VS remained stable throughout infusion and pt denied complaints. Pt's IV flushed w/ NS. Removed/ intact. Gauze/ coban to site. Discharge/ follow-up instructions discussed w/ pt. Pt verbalized understanding. Pt's armband removed & shredded. Ms. Huyen Gonzalez was discharged from [place] [address] in stable condition at 1100. She is to return on 7/15/21 at 0800 for her next appointment.      Brody Morgan RN  2021

## 2021-06-23 ENCOUNTER — TRANSCRIBE ORDER (OUTPATIENT)
Dept: SCHEDULING | Age: 71
End: 2021-06-23

## 2021-06-23 ENCOUNTER — HOSPITAL ENCOUNTER (OUTPATIENT)
Dept: MAMMOGRAPHY | Age: 71
Discharge: HOME | End: 2021-06-23
Attending: INTERNAL MEDICINE
Payer: MEDICARE

## 2021-06-23 DIAGNOSIS — Z12.31 SCREENING MAMMOGRAM FOR HIGH-RISK PATIENT: Primary | ICD-10-CM

## 2021-06-23 DIAGNOSIS — Z12.31 SCREENING MAMMOGRAM FOR HIGH-RISK PATIENT: ICD-10-CM

## 2021-06-23 PROCEDURE — 77067 SCR MAMMO BI INCL CAD: CPT

## 2021-06-29 ENCOUNTER — TRANSCRIBE ORDER (OUTPATIENT)
Dept: SCHEDULING | Age: 71
End: 2021-06-29

## 2021-06-29 DIAGNOSIS — N63.0 LUMP OR MASS IN BREAST: Primary | ICD-10-CM

## 2021-06-29 DIAGNOSIS — R92.8 ABNORMAL MAMMOGRAM: ICD-10-CM

## 2021-07-15 ENCOUNTER — HOSPITAL ENCOUNTER (OUTPATIENT)
Dept: INFUSION THERAPY | Age: 71
Discharge: HOME | End: 2021-07-15
Payer: MEDICARE

## 2021-07-15 VITALS
RESPIRATION RATE: 16 BRPM | WEIGHT: 152.12 LBS | SYSTOLIC BLOOD PRESSURE: 139 MMHG | DIASTOLIC BLOOD PRESSURE: 79 MMHG | HEART RATE: 77 BPM | BODY MASS INDEX: 26.11 KG/M2 | OXYGEN SATURATION: 100 % | TEMPERATURE: 97.8 F

## 2021-07-15 DIAGNOSIS — D80.1 HYPOGAMMAGLOBULINEMIA (HCC): Primary | ICD-10-CM

## 2021-07-15 PROCEDURE — 96365 THER/PROPH/DIAG IV INF INIT: CPT

## 2021-07-15 PROCEDURE — 74011250637 HC RX REV CODE- 250/637: Performed by: ALLERGY & IMMUNOLOGY

## 2021-07-15 PROCEDURE — 96366 THER/PROPH/DIAG IV INF ADDON: CPT

## 2021-07-15 PROCEDURE — 74011250636 HC RX REV CODE- 250/636

## 2021-07-15 PROCEDURE — 74011250636 HC RX REV CODE- 250/636: Performed by: ALLERGY & IMMUNOLOGY

## 2021-07-15 RX ORDER — DIPHENHYDRAMINE HCL 25 MG
25 CAPSULE ORAL ONCE
Status: CANCELLED
Start: 2021-08-12 | End: 2021-08-12

## 2021-07-15 RX ORDER — SODIUM CHLORIDE 0.9 % (FLUSH) 0.9 %
10 SYRINGE (ML) INJECTION AS NEEDED
Status: DISPENSED | OUTPATIENT
Start: 2021-07-15 | End: 2021-07-15

## 2021-07-15 RX ORDER — DIPHENHYDRAMINE HYDROCHLORIDE 50 MG/ML
50 INJECTION, SOLUTION INTRAMUSCULAR; INTRAVENOUS AS NEEDED
Status: CANCELLED
Start: 2021-08-12

## 2021-07-15 RX ORDER — SODIUM CHLORIDE 9 MG/ML
25 INJECTION, SOLUTION INTRAVENOUS CONTINUOUS
Status: CANCELLED | OUTPATIENT
Start: 2021-08-12

## 2021-07-15 RX ORDER — ALBUTEROL SULFATE 0.83 MG/ML
2.5 SOLUTION RESPIRATORY (INHALATION) AS NEEDED
Status: CANCELLED
Start: 2021-08-12

## 2021-07-15 RX ORDER — ACETAMINOPHEN 325 MG/1
650 TABLET ORAL ONCE
Status: CANCELLED
Start: 2021-08-12 | End: 2021-08-12

## 2021-07-15 RX ORDER — SODIUM CHLORIDE 9 MG/ML
25 INJECTION, SOLUTION INTRAVENOUS CONTINUOUS
Status: DISPENSED | OUTPATIENT
Start: 2021-07-15 | End: 2021-07-15

## 2021-07-15 RX ORDER — HEPARIN 100 UNIT/ML
300-500 SYRINGE INTRAVENOUS AS NEEDED
Status: CANCELLED
Start: 2021-08-12

## 2021-07-15 RX ORDER — SODIUM CHLORIDE 0.9 % (FLUSH) 0.9 %
10 SYRINGE (ML) INJECTION AS NEEDED
Status: CANCELLED | OUTPATIENT
Start: 2021-08-12

## 2021-07-15 RX ORDER — EPINEPHRINE 1 MG/ML
0.3 INJECTION, SOLUTION, CONCENTRATE INTRAVENOUS AS NEEDED
Status: CANCELLED | OUTPATIENT
Start: 2021-08-12

## 2021-07-15 RX ORDER — DIPHENHYDRAMINE HCL 25 MG
25 CAPSULE ORAL ONCE
Status: ACTIVE | OUTPATIENT
Start: 2021-07-15 | End: 2021-07-15

## 2021-07-15 RX ORDER — ACETAMINOPHEN 325 MG/1
650 TABLET ORAL AS NEEDED
Status: CANCELLED
Start: 2021-08-12

## 2021-07-15 RX ORDER — DIPHENHYDRAMINE HYDROCHLORIDE 50 MG/ML
25 INJECTION, SOLUTION INTRAMUSCULAR; INTRAVENOUS AS NEEDED
Status: CANCELLED
Start: 2021-08-12

## 2021-07-15 RX ORDER — SODIUM CHLORIDE 9 MG/ML
10 INJECTION INTRAMUSCULAR; INTRAVENOUS; SUBCUTANEOUS AS NEEDED
Status: CANCELLED | OUTPATIENT
Start: 2021-08-12

## 2021-07-15 RX ORDER — ONDANSETRON 2 MG/ML
8 INJECTION INTRAMUSCULAR; INTRAVENOUS AS NEEDED
Status: CANCELLED | OUTPATIENT
Start: 2021-08-12

## 2021-07-15 RX ORDER — ACETAMINOPHEN 325 MG/1
650 TABLET ORAL ONCE
Status: COMPLETED | OUTPATIENT
Start: 2021-07-15 | End: 2021-07-15

## 2021-07-15 RX ORDER — HYDROCORTISONE SODIUM SUCCINATE 100 MG/2ML
100 INJECTION, POWDER, FOR SOLUTION INTRAMUSCULAR; INTRAVENOUS AS NEEDED
Status: CANCELLED | OUTPATIENT
Start: 2021-08-12

## 2021-07-15 RX ADMIN — Medication 10 ML: at 08:15

## 2021-07-15 RX ADMIN — IMMUNE GLOBULIN (HUMAN) 10 G: 10 INJECTION INTRAVENOUS; SUBCUTANEOUS at 08:55

## 2021-07-15 RX ADMIN — SODIUM CHLORIDE 25 ML/HR: 9 INJECTION, SOLUTION INTRAVENOUS at 08:25

## 2021-07-15 RX ADMIN — IMMUNE GLOBULIN (HUMAN) 20 G: 10 INJECTION INTRAVENOUS; SUBCUTANEOUS at 10:25

## 2021-07-15 RX ADMIN — ACETAMINOPHEN 650 MG: 325 TABLET ORAL at 08:22

## 2021-07-15 NOTE — PROGRESS NOTES
[facility] Progress Note    Date: July 15, 2021    Name: Adelaida Victoria    MRN: [id number]         : 1950      Ms. Black Khan arrived in the [facility] today, at [facility], in stable condition, here for her IVIG Infusion (Every 4 Weeks). She was assessed and education was provided. Ms. Julio Donaldson vitals were reviewed. Visit Vitals  BP (!) 143/78 (BP 1 Location: Right upper arm, BP Patient Position: Sitting)   Pulse 83   Temp 97.8 °F (36.6 °C)   Resp 16   Wt 69 kg (152 lb 1.9 oz)   SpO2 100%   Breastfeeding No   BMI 26.11 kg/m²             PIV # 25 G was established in her right AC at 0815 without incident, and brisk blood return was obtained. NO LABS DUE TODAY. (IgG level is due every 6 months--last drawn on 21, so not due again until Oct. 2021.)      The following pre-medication was administered per order, approximately 30 minutes prior to starting the IVIG: TYLENOL 650 mg PO. However, Ms. Humberto Segal declined the PO BENADRYL 25 mg.       ml IV Bag was hung to infuse @ KVO PRN throughout treatment today. IVIG 30 grams (Immune Globulin 10 %) (GAMUNEX-C) , was administered IV, per order, and without incident, over approximately 2 hours. The IVIG was infused with the following rate titration: 33 ml/hr X 30 minutes, 66 ml/hr X 30 minutes, 132 ml/hr X 30 minutes, & 264 ml/hr until completion. After completion of the IVIG, the PIV was flushed very well per protocol with NS, and then, the PIV was removed and gauze/coban was applied. Ms. Black Khan tolerated well, and had no complaints. Ms. Black Khan was discharged from [place] [address] in stable condition at 1140. Yfn Ochoa She is to return in 4 weeks, on Thursday, 21, at 0800,  for her next appointment, for her next IVIG Infusion.      Lannis Claude, RN   July 15, 2021  8:30 AM

## 2021-07-16 ENCOUNTER — HOSPITAL ENCOUNTER (OUTPATIENT)
Dept: ULTRASOUND IMAGING | Age: 71
Discharge: HOME | End: 2021-07-16
Attending: INTERNAL MEDICINE
Payer: MEDICARE

## 2021-07-16 ENCOUNTER — HOSPITAL ENCOUNTER (OUTPATIENT)
Dept: MAMMOGRAPHY | Age: 71
Discharge: HOME | End: 2021-07-16
Attending: INTERNAL MEDICINE
Payer: MEDICARE

## 2021-07-16 DIAGNOSIS — R92.8 ABNORMAL MAMMOGRAM: ICD-10-CM

## 2021-07-16 DIAGNOSIS — N63.0 LUMP OR MASS IN BREAST: ICD-10-CM

## 2021-07-16 PROCEDURE — 76642 ULTRASOUND BREAST LIMITED: CPT

## 2021-07-16 PROCEDURE — 77061 BREAST TOMOSYNTHESIS UNI: CPT

## 2021-08-12 ENCOUNTER — HOSPITAL ENCOUNTER (OUTPATIENT)
Dept: INFUSION THERAPY | Age: 71
Discharge: HOME | End: 2021-08-12
Payer: MEDICARE

## 2021-08-12 VITALS
RESPIRATION RATE: 16 BRPM | TEMPERATURE: 98.1 F | SYSTOLIC BLOOD PRESSURE: 147 MMHG | OXYGEN SATURATION: 99 % | HEART RATE: 88 BPM | DIASTOLIC BLOOD PRESSURE: 84 MMHG

## 2021-08-12 DIAGNOSIS — D80.1 HYPOGAMMAGLOBULINEMIA (HCC): Primary | ICD-10-CM

## 2021-08-12 PROCEDURE — 96365 THER/PROPH/DIAG IV INF INIT: CPT

## 2021-08-12 PROCEDURE — 96366 THER/PROPH/DIAG IV INF ADDON: CPT

## 2021-08-12 PROCEDURE — 74011250636 HC RX REV CODE- 250/636

## 2021-08-12 PROCEDURE — 74011250636 HC RX REV CODE- 250/636: Performed by: ALLERGY & IMMUNOLOGY

## 2021-08-12 PROCEDURE — 74011250637 HC RX REV CODE- 250/637: Performed by: ALLERGY & IMMUNOLOGY

## 2021-08-12 RX ORDER — DIPHENHYDRAMINE HYDROCHLORIDE 50 MG/ML
25 INJECTION, SOLUTION INTRAMUSCULAR; INTRAVENOUS AS NEEDED
Status: CANCELLED
Start: 2021-09-09

## 2021-08-12 RX ORDER — HYDROCORTISONE SODIUM SUCCINATE 100 MG/2ML
100 INJECTION, POWDER, FOR SOLUTION INTRAMUSCULAR; INTRAVENOUS AS NEEDED
Status: CANCELLED | OUTPATIENT
Start: 2021-09-09

## 2021-08-12 RX ORDER — ACETAMINOPHEN 325 MG/1
650 TABLET ORAL AS NEEDED
Status: CANCELLED
Start: 2021-09-09

## 2021-08-12 RX ORDER — DIPHENHYDRAMINE HYDROCHLORIDE 50 MG/ML
50 INJECTION, SOLUTION INTRAMUSCULAR; INTRAVENOUS AS NEEDED
Status: CANCELLED
Start: 2021-09-09

## 2021-08-12 RX ORDER — ALBUTEROL SULFATE 0.83 MG/ML
2.5 SOLUTION RESPIRATORY (INHALATION) AS NEEDED
Status: CANCELLED
Start: 2021-09-09

## 2021-08-12 RX ORDER — SODIUM CHLORIDE 9 MG/ML
10 INJECTION INTRAMUSCULAR; INTRAVENOUS; SUBCUTANEOUS AS NEEDED
Status: CANCELLED | OUTPATIENT
Start: 2021-09-09

## 2021-08-12 RX ORDER — SODIUM CHLORIDE 0.9 % (FLUSH) 0.9 %
10 SYRINGE (ML) INJECTION AS NEEDED
Status: CANCELLED | OUTPATIENT
Start: 2021-09-09

## 2021-08-12 RX ORDER — HEPARIN 100 UNIT/ML
300-500 SYRINGE INTRAVENOUS AS NEEDED
Status: CANCELLED
Start: 2021-09-09

## 2021-08-12 RX ORDER — DIPHENHYDRAMINE HCL 25 MG
25 CAPSULE ORAL ONCE
Status: CANCELLED
Start: 2021-09-09 | End: 2021-09-09

## 2021-08-12 RX ORDER — SODIUM CHLORIDE 0.9 % (FLUSH) 0.9 %
10 SYRINGE (ML) INJECTION AS NEEDED
Status: DISPENSED | OUTPATIENT
Start: 2021-08-12 | End: 2021-08-12

## 2021-08-12 RX ORDER — DIPHENHYDRAMINE HCL 25 MG
25 CAPSULE ORAL ONCE
Status: ACTIVE | OUTPATIENT
Start: 2021-08-12 | End: 2021-08-12

## 2021-08-12 RX ORDER — ACETAMINOPHEN 325 MG/1
650 TABLET ORAL ONCE
Status: COMPLETED | OUTPATIENT
Start: 2021-08-12 | End: 2021-08-12

## 2021-08-12 RX ORDER — ONDANSETRON 2 MG/ML
8 INJECTION INTRAMUSCULAR; INTRAVENOUS AS NEEDED
Status: CANCELLED | OUTPATIENT
Start: 2021-09-09

## 2021-08-12 RX ORDER — SODIUM CHLORIDE 9 MG/ML
25 INJECTION, SOLUTION INTRAVENOUS CONTINUOUS
Status: CANCELLED | OUTPATIENT
Start: 2021-09-09

## 2021-08-12 RX ORDER — ACETAMINOPHEN 325 MG/1
650 TABLET ORAL ONCE
Status: CANCELLED
Start: 2021-09-09 | End: 2021-09-09

## 2021-08-12 RX ORDER — EPINEPHRINE 1 MG/ML
0.3 INJECTION, SOLUTION, CONCENTRATE INTRAVENOUS AS NEEDED
Status: CANCELLED | OUTPATIENT
Start: 2021-09-09

## 2021-08-12 RX ADMIN — IMMUNE GLOBULIN (HUMAN) 10 G: 10 INJECTION INTRAVENOUS; SUBCUTANEOUS at 10:22

## 2021-08-12 RX ADMIN — IMMUNE GLOBULIN (HUMAN) 20 G: 10 INJECTION INTRAVENOUS; SUBCUTANEOUS at 08:33

## 2021-08-12 RX ADMIN — ACETAMINOPHEN 650 MG: 325 TABLET ORAL at 08:24

## 2021-08-12 RX ADMIN — Medication 10 ML: at 08:33

## 2021-08-12 RX ADMIN — Medication 10 ML: at 10:51

## 2021-08-12 NOTE — PROGRESS NOTES
[facility] Progress Note    Date: 2021    Name: Andreina Isabel    MRN: [id number]         : 1950      Ms. Keyanna Perry arrived to [facility] at 1526. Pt is here for q4 week IVIG infusion. Ms. Keyanna Perry was assessed and education was provided. Ms. Daisha Ac vitals were reviewed. Visit Vitals  BP (!) 147/81 (BP 1 Location: Right upper arm, BP Patient Position: Sitting)   Pulse 79   Temp 97.9 °F (36.6 °C)   Resp 16   SpO2 100%       Tylenol 650mg administered as ordered for pre-medication. Pt politely refused Benadryl.    22g IV inserted in patient's left AC x1 attempt. Brisk blood return/ flushes without difficulty. IVIG 10% (Gamunex-C) 30 grams administered as ordered with the following rate titrations: 33 ml/hr x 30 minutes, 66 ml/hr x 30 minutes, 132 ml/hr x 30 minutes, and 264 ml/hr until completion. Pt's VS remained stable throughout infusion and pt denied complaints. Pt's IV flushed w/ NS. Removed/ intact. Gauze/ coban to site. Discharge/ follow-up instructions discussed w/ pt. Pt verbalized understanding. Pt's armband removed & shredded. Ms. Keyanna Perry was discharged from [place] [address] in stable condition at 1100. She is to return on 21 at 0800 for her next appointment.      Erwin Sue RN  2021

## 2021-08-13 ENCOUNTER — HOSPITAL ENCOUNTER (OUTPATIENT)
Dept: MAMMOGRAPHY | Age: 71
Discharge: HOME | End: 2021-08-13
Attending: INTERNAL MEDICINE
Payer: MEDICARE

## 2021-08-13 DIAGNOSIS — R92.8 ABNORMAL MAMMOGRAM: ICD-10-CM

## 2021-08-13 DIAGNOSIS — N63.0 LUMP OR MASS IN BREAST: ICD-10-CM

## 2021-08-13 PROCEDURE — 74011000250 HC RX REV CODE- 250: Performed by: INTERNAL MEDICINE

## 2021-08-13 PROCEDURE — 88305 TISSUE EXAM BY PATHOLOGIST: CPT

## 2021-08-13 PROCEDURE — 77065 DX MAMMO INCL CAD UNI: CPT

## 2021-08-13 PROCEDURE — 77030013689 MAM STEREO VAC  BX BREAST RT 1ST LESION W/CLIP AND SPECIMEN

## 2021-08-13 RX ORDER — LIDOCAINE HYDROCHLORIDE 10 MG/ML
1-5 INJECTION, SOLUTION EPIDURAL; INFILTRATION; INTRACAUDAL; PERINEURAL
Status: COMPLETED | OUTPATIENT
Start: 2021-08-13 | End: 2021-08-13

## 2021-08-13 RX ORDER — LIDOCAINE HYDROCHLORIDE AND EPINEPHRINE 10; 10 MG/ML; UG/ML
1-20 INJECTION, SOLUTION INFILTRATION; PERINEURAL
Status: COMPLETED | OUTPATIENT
Start: 2021-08-13 | End: 2021-08-13

## 2021-08-13 RX ADMIN — LIDOCAINE HYDROCHLORIDE 3 ML: 10 INJECTION, SOLUTION EPIDURAL; INFILTRATION; INTRACAUDAL; PERINEURAL at 11:47

## 2021-08-13 RX ADMIN — LIDOCAINE HYDROCHLORIDE AND EPINEPHRINE 20 ML: 10; 10 INJECTION, SOLUTION INFILTRATION; PERINEURAL at 11:53

## 2021-09-09 ENCOUNTER — HOSPITAL ENCOUNTER (OUTPATIENT)
Dept: INFUSION THERAPY | Age: 71
Discharge: HOME | End: 2021-09-09
Payer: MEDICARE

## 2021-09-09 VITALS
HEART RATE: 74 BPM | OXYGEN SATURATION: 100 % | SYSTOLIC BLOOD PRESSURE: 159 MMHG | DIASTOLIC BLOOD PRESSURE: 85 MMHG | RESPIRATION RATE: 16 BRPM | TEMPERATURE: 98.1 F

## 2021-09-09 DIAGNOSIS — D80.1 HYPOGAMMAGLOBULINEMIA (HCC): Primary | ICD-10-CM

## 2021-09-09 PROCEDURE — 74011250636 HC RX REV CODE- 250/636: Performed by: ALLERGY & IMMUNOLOGY

## 2021-09-09 PROCEDURE — 96365 THER/PROPH/DIAG IV INF INIT: CPT

## 2021-09-09 PROCEDURE — 96366 THER/PROPH/DIAG IV INF ADDON: CPT

## 2021-09-09 PROCEDURE — 74011250637 HC RX REV CODE- 250/637: Performed by: ALLERGY & IMMUNOLOGY

## 2021-09-09 PROCEDURE — 74011250636 HC RX REV CODE- 250/636

## 2021-09-09 RX ORDER — ACETAMINOPHEN 325 MG/1
650 TABLET ORAL ONCE
Status: CANCELLED
Start: 2021-10-07 | End: 2021-10-07

## 2021-09-09 RX ORDER — DIPHENHYDRAMINE HCL 25 MG
25 CAPSULE ORAL ONCE
Status: CANCELLED
Start: 2021-10-07 | End: 2021-10-07

## 2021-09-09 RX ORDER — ONDANSETRON 2 MG/ML
8 INJECTION INTRAMUSCULAR; INTRAVENOUS AS NEEDED
Status: CANCELLED | OUTPATIENT
Start: 2021-10-07

## 2021-09-09 RX ORDER — EPINEPHRINE 1 MG/ML
0.3 INJECTION, SOLUTION, CONCENTRATE INTRAVENOUS AS NEEDED
Status: CANCELLED | OUTPATIENT
Start: 2021-10-07

## 2021-09-09 RX ORDER — ACETAMINOPHEN 325 MG/1
650 TABLET ORAL AS NEEDED
Status: CANCELLED
Start: 2021-10-07

## 2021-09-09 RX ORDER — HEPARIN 100 UNIT/ML
300-500 SYRINGE INTRAVENOUS AS NEEDED
Status: CANCELLED
Start: 2021-10-07

## 2021-09-09 RX ORDER — ALBUTEROL SULFATE 0.83 MG/ML
2.5 SOLUTION RESPIRATORY (INHALATION) AS NEEDED
Status: CANCELLED
Start: 2021-10-07

## 2021-09-09 RX ORDER — HYDROCORTISONE SODIUM SUCCINATE 100 MG/2ML
100 INJECTION, POWDER, FOR SOLUTION INTRAMUSCULAR; INTRAVENOUS AS NEEDED
Status: CANCELLED | OUTPATIENT
Start: 2021-10-07

## 2021-09-09 RX ORDER — DIPHENHYDRAMINE HYDROCHLORIDE 50 MG/ML
25 INJECTION, SOLUTION INTRAMUSCULAR; INTRAVENOUS AS NEEDED
Status: CANCELLED
Start: 2021-10-07

## 2021-09-09 RX ORDER — SODIUM CHLORIDE 0.9 % (FLUSH) 0.9 %
10 SYRINGE (ML) INJECTION AS NEEDED
Status: DISPENSED | OUTPATIENT
Start: 2021-09-09 | End: 2021-09-09

## 2021-09-09 RX ORDER — ACETAMINOPHEN 325 MG/1
650 TABLET ORAL ONCE
Status: COMPLETED | OUTPATIENT
Start: 2021-09-09 | End: 2021-09-09

## 2021-09-09 RX ORDER — SODIUM CHLORIDE 0.9 % (FLUSH) 0.9 %
10 SYRINGE (ML) INJECTION AS NEEDED
Status: CANCELLED | OUTPATIENT
Start: 2021-10-07

## 2021-09-09 RX ORDER — DIPHENHYDRAMINE HYDROCHLORIDE 50 MG/ML
50 INJECTION, SOLUTION INTRAMUSCULAR; INTRAVENOUS AS NEEDED
Status: CANCELLED
Start: 2021-10-07

## 2021-09-09 RX ORDER — SODIUM CHLORIDE 9 MG/ML
25 INJECTION, SOLUTION INTRAVENOUS CONTINUOUS
Status: CANCELLED | OUTPATIENT
Start: 2021-10-07

## 2021-09-09 RX ORDER — SODIUM CHLORIDE 9 MG/ML
10 INJECTION INTRAMUSCULAR; INTRAVENOUS; SUBCUTANEOUS AS NEEDED
Status: CANCELLED | OUTPATIENT
Start: 2021-10-07

## 2021-09-09 RX ADMIN — Medication 10 ML: at 10:55

## 2021-09-09 RX ADMIN — IMMUNE GLOBULIN (HUMAN) 20 G: 10 INJECTION INTRAVENOUS; SUBCUTANEOUS at 10:02

## 2021-09-09 RX ADMIN — IMMUNE GLOBULIN (HUMAN) 10 G: 10 INJECTION INTRAVENOUS; SUBCUTANEOUS at 08:34

## 2021-09-09 RX ADMIN — ACETAMINOPHEN 650 MG: 325 TABLET ORAL at 08:23

## 2021-09-09 RX ADMIN — Medication 10 ML: at 08:34

## 2021-09-09 NOTE — PROGRESS NOTES
[facility] Progress Note    Date: 2021    Name: Janina Cardoso    MRN: [id number]         : 1950      Ms. Michelle Villeda arrived to [facility] at 3918. Pt is here for q4 week IVIG infusion. Ms. Michelle Villeda was assessed and education was provided. Ms. Bauer vitals were reviewed. Visit Vitals  BP (!) 148/82 (BP 1 Location: Right upper arm, BP Patient Position: Sitting)   Pulse 85   Temp 98.1 °F (36.7 °C)   Resp 16   SpO2 100%       Tylenol 650mg administered as ordered for pre-medication. Pt politely refused Benadryl.    22g IV inserted in patient's left AC x1 attempt (after failed attempt x1 in pt's right AC). Brisk blood return/ flushes without difficulty. IVIG 10% (Gamunex-C) 30 grams administered as ordered with the following rate titrations: 33 ml/hr x 30 minutes, 66 ml/hr x 30 minutes, 132 ml/hr x 30 minutes, and 264 ml/hr until completion. Pt's VS remained stable throughout infusion and pt denied complaints. Pt's IV flushed w/ NS. Removed/ intact. Gauze/ coban to site. Discharge/ follow-up instructions discussed w/ pt. Pt verbalized understanding. Pt's armband removed & shredded. Ms. Michelle Villeda was discharged from [place] [address] in stable condition at 1100. She is to return on 10/7/21 at 0800 for her next appointment.      Heladio Gandhi RN  2021

## 2021-10-07 ENCOUNTER — HOSPITAL ENCOUNTER (OUTPATIENT)
Dept: INFUSION THERAPY | Age: 71
Discharge: HOME | End: 2021-10-07
Payer: MEDICARE

## 2021-10-07 VITALS
HEIGHT: 64 IN | RESPIRATION RATE: 16 BRPM | OXYGEN SATURATION: 98 % | HEART RATE: 77 BPM | SYSTOLIC BLOOD PRESSURE: 145 MMHG | BODY MASS INDEX: 26.84 KG/M2 | TEMPERATURE: 97.7 F | WEIGHT: 157.2 LBS | DIASTOLIC BLOOD PRESSURE: 79 MMHG

## 2021-10-07 DIAGNOSIS — D80.1 HYPOGAMMAGLOBULINEMIA (HCC): Primary | ICD-10-CM

## 2021-10-07 LAB — IGG SERPL-MCNC: 867 MG/DL (ref 700–1600)

## 2021-10-07 PROCEDURE — 74011250636 HC RX REV CODE- 250/636

## 2021-10-07 PROCEDURE — 96365 THER/PROPH/DIAG IV INF INIT: CPT

## 2021-10-07 PROCEDURE — 74011250637 HC RX REV CODE- 250/637: Performed by: ALLERGY & IMMUNOLOGY

## 2021-10-07 PROCEDURE — 96366 THER/PROPH/DIAG IV INF ADDON: CPT

## 2021-10-07 PROCEDURE — 82784 ASSAY IGA/IGD/IGG/IGM EACH: CPT

## 2021-10-07 PROCEDURE — 74011250636 HC RX REV CODE- 250/636: Performed by: ALLERGY & IMMUNOLOGY

## 2021-10-07 RX ORDER — ACETAMINOPHEN 325 MG/1
650 TABLET ORAL ONCE
Status: COMPLETED | OUTPATIENT
Start: 2021-10-07 | End: 2021-10-07

## 2021-10-07 RX ORDER — SODIUM CHLORIDE 0.9 % (FLUSH) 0.9 %
10 SYRINGE (ML) INJECTION AS NEEDED
Status: CANCELLED | OUTPATIENT
Start: 2021-11-04

## 2021-10-07 RX ORDER — SODIUM CHLORIDE 9 MG/ML
25 INJECTION, SOLUTION INTRAVENOUS CONTINUOUS
Status: CANCELLED | OUTPATIENT
Start: 2021-11-04

## 2021-10-07 RX ORDER — HEPARIN 100 UNIT/ML
300-500 SYRINGE INTRAVENOUS AS NEEDED
Status: CANCELLED
Start: 2021-11-04

## 2021-10-07 RX ORDER — ONDANSETRON 2 MG/ML
8 INJECTION INTRAMUSCULAR; INTRAVENOUS AS NEEDED
Status: CANCELLED | OUTPATIENT
Start: 2021-11-04

## 2021-10-07 RX ORDER — EPINEPHRINE 1 MG/ML
0.3 INJECTION, SOLUTION, CONCENTRATE INTRAVENOUS AS NEEDED
Status: CANCELLED | OUTPATIENT
Start: 2021-11-04

## 2021-10-07 RX ORDER — DIPHENHYDRAMINE HCL 25 MG
25 CAPSULE ORAL ONCE
Status: CANCELLED
Start: 2021-11-04 | End: 2021-11-04

## 2021-10-07 RX ORDER — HYDROCORTISONE SODIUM SUCCINATE 100 MG/2ML
100 INJECTION, POWDER, FOR SOLUTION INTRAMUSCULAR; INTRAVENOUS AS NEEDED
Status: CANCELLED | OUTPATIENT
Start: 2021-11-04

## 2021-10-07 RX ORDER — ALBUTEROL SULFATE 0.83 MG/ML
2.5 SOLUTION RESPIRATORY (INHALATION) AS NEEDED
Status: CANCELLED
Start: 2021-11-04

## 2021-10-07 RX ORDER — DIPHENHYDRAMINE HYDROCHLORIDE 50 MG/ML
50 INJECTION, SOLUTION INTRAMUSCULAR; INTRAVENOUS AS NEEDED
Status: CANCELLED
Start: 2021-11-04

## 2021-10-07 RX ORDER — DIPHENHYDRAMINE HCL 25 MG
25 CAPSULE ORAL ONCE
Status: ACTIVE | OUTPATIENT
Start: 2021-10-07 | End: 2021-10-07

## 2021-10-07 RX ORDER — ACETAMINOPHEN 325 MG/1
650 TABLET ORAL AS NEEDED
Status: CANCELLED
Start: 2021-11-04

## 2021-10-07 RX ORDER — ACETAMINOPHEN 325 MG/1
650 TABLET ORAL ONCE
Status: CANCELLED
Start: 2021-11-04 | End: 2021-11-04

## 2021-10-07 RX ORDER — DIPHENHYDRAMINE HYDROCHLORIDE 50 MG/ML
25 INJECTION, SOLUTION INTRAMUSCULAR; INTRAVENOUS AS NEEDED
Status: CANCELLED
Start: 2021-11-04

## 2021-10-07 RX ORDER — SODIUM CHLORIDE 9 MG/ML
10 INJECTION INTRAMUSCULAR; INTRAVENOUS; SUBCUTANEOUS AS NEEDED
Status: ACTIVE | OUTPATIENT
Start: 2021-10-07 | End: 2021-10-07

## 2021-10-07 RX ORDER — SODIUM CHLORIDE 9 MG/ML
10 INJECTION INTRAMUSCULAR; INTRAVENOUS; SUBCUTANEOUS AS NEEDED
Status: CANCELLED | OUTPATIENT
Start: 2021-11-04

## 2021-10-07 RX ORDER — SODIUM CHLORIDE 9 MG/ML
25 INJECTION, SOLUTION INTRAVENOUS CONTINUOUS
Status: DISPENSED | OUTPATIENT
Start: 2021-10-07 | End: 2021-10-07

## 2021-10-07 RX ADMIN — SODIUM CHLORIDE 10 ML: 9 INJECTION INTRAMUSCULAR; INTRAVENOUS; SUBCUTANEOUS at 08:26

## 2021-10-07 RX ADMIN — IMMUNE GLOBULIN (HUMAN) 20 G: 10 INJECTION INTRAVENOUS; SUBCUTANEOUS at 09:05

## 2021-10-07 RX ADMIN — ACETAMINOPHEN 650 MG: 325 TABLET ORAL at 08:31

## 2021-10-07 RX ADMIN — IMMUNE GLOBULIN (HUMAN) 10 G: 10 INJECTION INTRAVENOUS; SUBCUTANEOUS at 11:00

## 2021-10-07 RX ADMIN — SODIUM CHLORIDE 25 ML/HR: 9 INJECTION, SOLUTION INTRAVENOUS at 08:42

## 2021-10-07 NOTE — PROGRESS NOTES
[facility] Progress Note    Date: 2021    Name: Jamie Rascon    MRN: [id number]         : 1950      Ms. Samantha Dwyer arrived to [facility] at [address] [facility]. Pt is here for q4 week IVIG infusion. Ms. Samantha Dwyer was assessed and education was provided. Ms. Fauzia Head vitals were reviewed. Visit Vitals  BP (!) 130/90 (BP 1 Location: Right upper arm, BP Patient Position: Sitting)   Pulse 85   Temp 98.2 °F (36.8 °C)   Resp 16   Ht 5' 4\" (1.626 m)   Wt 71.3 kg (157 lb 3.2 oz)   SpO2 98%   Breastfeeding No   BMI 26.98 kg/m²       Tylenol 650mg administered as ordered for pre-medication. Pt politely refused Benadryl.    22g IV inserted in patient's left AC x1 attempt. Brisk blood return/ flushes without difficulty. IgG lab drawn and sent to hospital for processing. IVIG 10% (Gamunex-C) 30 grams administered as ordered with the following rate titrations: 33 ml/hr x 30 minutes, 66 ml/hr x 30 minutes, 132 ml/hr x 30 minutes, and 264 ml/hr until completion. Pt's VS remained stable throughout infusion and pt denied complaints. Pt's IV flushed w/ NS. Removed/ intact. Gauze/ coban to site. Discharge/ follow-up instructions discussed w/ pt. Pt verbalized understanding. Pt's armband removed & shredded. Ms. Samantha Dwyer was discharged from [place] [address] in stable condition at 1135. She is to return on 21 at 0800 for her next appointment.      Laurent Zaldivar RN  2021

## 2021-11-04 ENCOUNTER — HOSPITAL ENCOUNTER (OUTPATIENT)
Dept: INFUSION THERAPY | Age: 71
Discharge: HOME | End: 2021-11-04
Payer: MEDICARE

## 2021-11-04 VITALS
SYSTOLIC BLOOD PRESSURE: 144 MMHG | DIASTOLIC BLOOD PRESSURE: 88 MMHG | RESPIRATION RATE: 16 BRPM | HEART RATE: 86 BPM | OXYGEN SATURATION: 100 % | TEMPERATURE: 98.3 F

## 2021-11-04 DIAGNOSIS — D80.1 HYPOGAMMAGLOBULINEMIA (HCC): Primary | ICD-10-CM

## 2021-11-04 PROCEDURE — 74011250636 HC RX REV CODE- 250/636

## 2021-11-04 PROCEDURE — 96367 TX/PROPH/DG ADDL SEQ IV INF: CPT

## 2021-11-04 PROCEDURE — 74011250636 HC RX REV CODE- 250/636: Performed by: ALLERGY & IMMUNOLOGY

## 2021-11-04 PROCEDURE — 96366 THER/PROPH/DIAG IV INF ADDON: CPT

## 2021-11-04 PROCEDURE — 96365 THER/PROPH/DIAG IV INF INIT: CPT

## 2021-11-04 PROCEDURE — 74011250637 HC RX REV CODE- 250/637: Performed by: ALLERGY & IMMUNOLOGY

## 2021-11-04 RX ORDER — ACETAMINOPHEN 325 MG/1
650 TABLET ORAL AS NEEDED
Status: CANCELLED
Start: 2021-12-02

## 2021-11-04 RX ORDER — ALBUTEROL SULFATE 0.83 MG/ML
2.5 SOLUTION RESPIRATORY (INHALATION) AS NEEDED
Status: CANCELLED
Start: 2021-12-02

## 2021-11-04 RX ORDER — ONDANSETRON 2 MG/ML
8 INJECTION INTRAMUSCULAR; INTRAVENOUS AS NEEDED
Status: CANCELLED | OUTPATIENT
Start: 2021-12-02

## 2021-11-04 RX ORDER — HEPARIN 100 UNIT/ML
300-500 SYRINGE INTRAVENOUS AS NEEDED
Status: CANCELLED
Start: 2021-12-02

## 2021-11-04 RX ORDER — SODIUM CHLORIDE 9 MG/ML
10 INJECTION INTRAMUSCULAR; INTRAVENOUS; SUBCUTANEOUS AS NEEDED
Status: CANCELLED | OUTPATIENT
Start: 2021-12-02

## 2021-11-04 RX ORDER — ACETAMINOPHEN 325 MG/1
650 TABLET ORAL ONCE
Status: COMPLETED | OUTPATIENT
Start: 2021-11-04 | End: 2021-11-04

## 2021-11-04 RX ORDER — EPINEPHRINE 1 MG/ML
0.3 INJECTION, SOLUTION, CONCENTRATE INTRAVENOUS AS NEEDED
Status: CANCELLED | OUTPATIENT
Start: 2021-12-02

## 2021-11-04 RX ORDER — DIPHENHYDRAMINE HYDROCHLORIDE 50 MG/ML
50 INJECTION, SOLUTION INTRAMUSCULAR; INTRAVENOUS AS NEEDED
Status: CANCELLED
Start: 2021-12-02

## 2021-11-04 RX ORDER — SODIUM CHLORIDE 0.9 % (FLUSH) 0.9 %
10 SYRINGE (ML) INJECTION AS NEEDED
Status: CANCELLED | OUTPATIENT
Start: 2021-12-02

## 2021-11-04 RX ORDER — SODIUM CHLORIDE 9 MG/ML
25 INJECTION, SOLUTION INTRAVENOUS CONTINUOUS
Status: DISPENSED | OUTPATIENT
Start: 2021-11-04 | End: 2021-11-04

## 2021-11-04 RX ORDER — DIPHENHYDRAMINE HCL 25 MG
25 CAPSULE ORAL ONCE
Status: CANCELLED
Start: 2021-12-02 | End: 2021-12-02

## 2021-11-04 RX ORDER — SODIUM CHLORIDE 9 MG/ML
25 INJECTION, SOLUTION INTRAVENOUS CONTINUOUS
Status: CANCELLED | OUTPATIENT
Start: 2021-12-02

## 2021-11-04 RX ORDER — ACETAMINOPHEN 325 MG/1
650 TABLET ORAL ONCE
Status: CANCELLED
Start: 2021-12-02 | End: 2021-12-02

## 2021-11-04 RX ORDER — SODIUM CHLORIDE 0.9 % (FLUSH) 0.9 %
10 SYRINGE (ML) INJECTION AS NEEDED
Status: DISPENSED | OUTPATIENT
Start: 2021-11-04 | End: 2021-11-04

## 2021-11-04 RX ORDER — DIPHENHYDRAMINE HCL 25 MG
25 CAPSULE ORAL ONCE
Status: ACTIVE | OUTPATIENT
Start: 2021-11-04 | End: 2021-11-04

## 2021-11-04 RX ORDER — HYDROCORTISONE SODIUM SUCCINATE 100 MG/2ML
100 INJECTION, POWDER, FOR SOLUTION INTRAMUSCULAR; INTRAVENOUS AS NEEDED
Status: CANCELLED | OUTPATIENT
Start: 2021-12-02

## 2021-11-04 RX ORDER — DIPHENHYDRAMINE HYDROCHLORIDE 50 MG/ML
25 INJECTION, SOLUTION INTRAMUSCULAR; INTRAVENOUS AS NEEDED
Status: CANCELLED
Start: 2021-12-02

## 2021-11-04 RX ADMIN — Medication 10 ML: at 11:45

## 2021-11-04 RX ADMIN — IMMUNE GLOBULIN (HUMAN) 10 G: 10 INJECTION INTRAVENOUS; SUBCUTANEOUS at 10:55

## 2021-11-04 RX ADMIN — IMMUNE GLOBULIN (HUMAN) 20 G: 10 INJECTION INTRAVENOUS; SUBCUTANEOUS at 08:58

## 2021-11-04 RX ADMIN — ACETAMINOPHEN 650 MG: 325 TABLET ORAL at 08:44

## 2021-11-04 RX ADMIN — SODIUM CHLORIDE 25 ML/HR: 9 INJECTION, SOLUTION INTRAVENOUS at 08:44

## 2021-11-04 NOTE — PROGRESS NOTES
[facility] Progress Note    Date: 2021    Name: Osman Ramos    MRN: [id number]         : 1950      Ms. Patricio Chase arrived to [facility] at 0800. Pt is here for q4 week IVIG infusion. Ms. Patricio Chase was assessed and education was provided. Ms. Brody Noe vitals were reviewed. Visit Vitals  BP (!) 144/88 (BP 1 Location: Right upper arm, BP Patient Position: Sitting)   Pulse 86   Temp 98.3 °F (36.8 °C)   Resp 16   SpO2 100%   Breastfeeding No       Tylenol 650mg administered as ordered for pre-medication. Pt politely refused Benadryl.    24g IV inserted in patient's right AC x1 attempt. Brisk blood return/ flushes without difficulty. IgG lab drawn and sent to hospital for processing. IVIG 10% (Gamunex-C) 30 grams administered as ordered with the following rate titrations: 33 ml/hr x 30 minutes, 66 ml/hr x 30 minutes, 132 ml/hr x 30 minutes, and 264 ml/hr until completion. Pt's VS remained stable throughout infusion and pt denied complaints. Pt's IV flushed w/ NS. Removed/ intact. Gauze/ coban to site. Discharge/ follow-up instructions discussed w/ pt. Pt verbalized understanding. Pt's armband removed & shredded. Ms. Patricio Chase was discharged from [place] [address] in stable condition at 1145. She is to return on 21 at 0800 for her next appointment.      Jesús Soares RN  2021

## 2021-12-02 ENCOUNTER — HOSPITAL ENCOUNTER (OUTPATIENT)
Dept: INFUSION THERAPY | Age: 71
Discharge: HOME | End: 2021-12-02
Payer: MEDICARE

## 2021-12-02 VITALS
TEMPERATURE: 98.3 F | SYSTOLIC BLOOD PRESSURE: 134 MMHG | OXYGEN SATURATION: 99 % | HEART RATE: 80 BPM | RESPIRATION RATE: 16 BRPM | DIASTOLIC BLOOD PRESSURE: 83 MMHG

## 2021-12-02 DIAGNOSIS — D80.1 HYPOGAMMAGLOBULINEMIA (HCC): Primary | ICD-10-CM

## 2021-12-02 PROCEDURE — 96366 THER/PROPH/DIAG IV INF ADDON: CPT

## 2021-12-02 PROCEDURE — 96365 THER/PROPH/DIAG IV INF INIT: CPT

## 2021-12-02 PROCEDURE — 74011250637 HC RX REV CODE- 250/637: Performed by: ALLERGY & IMMUNOLOGY

## 2021-12-02 PROCEDURE — 74011250636 HC RX REV CODE- 250/636: Performed by: ALLERGY & IMMUNOLOGY

## 2021-12-02 PROCEDURE — 74011250636 HC RX REV CODE- 250/636

## 2021-12-02 RX ORDER — DIPHENHYDRAMINE HYDROCHLORIDE 50 MG/ML
25 INJECTION, SOLUTION INTRAMUSCULAR; INTRAVENOUS AS NEEDED
Status: CANCELLED
Start: 2021-12-30

## 2021-12-02 RX ORDER — ACETAMINOPHEN 325 MG/1
650 TABLET ORAL AS NEEDED
Status: CANCELLED
Start: 2021-12-30

## 2021-12-02 RX ORDER — DIPHENHYDRAMINE HCL 25 MG
25 CAPSULE ORAL ONCE
Status: CANCELLED
Start: 2021-12-30 | End: 2021-12-30

## 2021-12-02 RX ORDER — ONDANSETRON 2 MG/ML
8 INJECTION INTRAMUSCULAR; INTRAVENOUS AS NEEDED
Status: CANCELLED | OUTPATIENT
Start: 2021-12-30

## 2021-12-02 RX ORDER — SODIUM CHLORIDE 0.9 % (FLUSH) 0.9 %
10 SYRINGE (ML) INJECTION AS NEEDED
Status: DISPENSED | OUTPATIENT
Start: 2021-12-02 | End: 2021-12-02

## 2021-12-02 RX ORDER — HEPARIN 100 UNIT/ML
300-500 SYRINGE INTRAVENOUS AS NEEDED
Status: CANCELLED
Start: 2021-12-30

## 2021-12-02 RX ORDER — ALBUTEROL SULFATE 0.83 MG/ML
2.5 SOLUTION RESPIRATORY (INHALATION) AS NEEDED
Status: CANCELLED
Start: 2021-12-30

## 2021-12-02 RX ORDER — ACETAMINOPHEN 325 MG/1
650 TABLET ORAL ONCE
Status: CANCELLED
Start: 2021-12-30 | End: 2021-12-30

## 2021-12-02 RX ORDER — EPINEPHRINE 1 MG/ML
0.3 INJECTION, SOLUTION, CONCENTRATE INTRAVENOUS AS NEEDED
Status: CANCELLED | OUTPATIENT
Start: 2021-12-30

## 2021-12-02 RX ORDER — SODIUM CHLORIDE 9 MG/ML
25 INJECTION, SOLUTION INTRAVENOUS CONTINUOUS
Status: CANCELLED | OUTPATIENT
Start: 2021-12-30

## 2021-12-02 RX ORDER — ACETAMINOPHEN 325 MG/1
650 TABLET ORAL ONCE
Status: COMPLETED | OUTPATIENT
Start: 2021-12-02 | End: 2021-12-02

## 2021-12-02 RX ORDER — DIPHENHYDRAMINE HYDROCHLORIDE 50 MG/ML
50 INJECTION, SOLUTION INTRAMUSCULAR; INTRAVENOUS AS NEEDED
Status: CANCELLED
Start: 2021-12-30

## 2021-12-02 RX ORDER — SODIUM CHLORIDE 9 MG/ML
10 INJECTION INTRAMUSCULAR; INTRAVENOUS; SUBCUTANEOUS AS NEEDED
Status: CANCELLED | OUTPATIENT
Start: 2021-12-30

## 2021-12-02 RX ORDER — SODIUM CHLORIDE 0.9 % (FLUSH) 0.9 %
10 SYRINGE (ML) INJECTION AS NEEDED
Status: CANCELLED | OUTPATIENT
Start: 2021-12-30

## 2021-12-02 RX ORDER — HYDROCORTISONE SODIUM SUCCINATE 100 MG/2ML
100 INJECTION, POWDER, FOR SOLUTION INTRAMUSCULAR; INTRAVENOUS AS NEEDED
Status: CANCELLED | OUTPATIENT
Start: 2021-12-30

## 2021-12-02 RX ADMIN — Medication 20 ML: at 10:57

## 2021-12-02 RX ADMIN — IMMUNE GLOBULIN (HUMAN) 20 G: 10 INJECTION INTRAVENOUS; SUBCUTANEOUS at 09:56

## 2021-12-02 RX ADMIN — IMMUNE GLOBULIN (HUMAN) 10 G: 10 INJECTION INTRAVENOUS; SUBCUTANEOUS at 08:35

## 2021-12-02 RX ADMIN — Medication 10 ML: at 08:35

## 2021-12-02 RX ADMIN — ACETAMINOPHEN 650 MG: 325 TABLET ORAL at 08:27

## 2021-12-02 NOTE — PROGRESS NOTES
[facility] Progress Note    Date: 2021    Name: Meliton Garrido    MRN: [id number]         : 1950      Ms. Tyrell Conner arrived to [facility] at 9436. Pt is here for q4 week IVIG infusion. Ms. Tyrell Conner was assessed and education was provided. Ms. Landers Del vitals were reviewed. Visit Vitals  BP (!) 147/88 (BP 1 Location: Right upper arm, BP Patient Position: Sitting)   Pulse 90   Temp 97.7 °F (36.5 °C)   Resp 16   SpO2 99%       Tylenol 650mg administered as ordered for pre-medication. Pt politely refused Benadryl.    22g IV inserted in patient's left AC x1 attempt. Brisk blood return/ flushes without difficulty. IVIG 10% (Gamunex-C) 30 grams administered as ordered with the following rate titrations: 33 ml/hr x 30 minutes, 66 ml/hr x 30 minutes, 132 ml/hr x 30 minutes, and 264 ml/hr until completion. Pt's VS remained stable throughout infusion and pt denied complaints. Pt's IV flushed w/ NS. Removed/ intact. Gauze/ coban to site. Discharge/ follow-up instructions discussed w/ pt. Pt verbalized understanding. Pt's armband removed & shredded. Ms. Tyrell Conner was discharged from [place] [address] in stable condition at 1100. She is to return on 21 at 0800 for her next appointment.      Yovana Fernandez RN  2021

## 2021-12-30 ENCOUNTER — HOSPITAL ENCOUNTER (OUTPATIENT)
Dept: INFUSION THERAPY | Age: 71
Discharge: HOME | End: 2021-12-30
Payer: MEDICARE

## 2021-12-30 VITALS
OXYGEN SATURATION: 97 % | DIASTOLIC BLOOD PRESSURE: 77 MMHG | HEART RATE: 73 BPM | RESPIRATION RATE: 16 BRPM | SYSTOLIC BLOOD PRESSURE: 122 MMHG | TEMPERATURE: 97.9 F

## 2021-12-30 DIAGNOSIS — D80.1 HYPOGAMMAGLOBULINEMIA (HCC): Primary | ICD-10-CM

## 2021-12-30 PROCEDURE — 96366 THER/PROPH/DIAG IV INF ADDON: CPT

## 2021-12-30 PROCEDURE — 96365 THER/PROPH/DIAG IV INF INIT: CPT

## 2021-12-30 PROCEDURE — 74011250637 HC RX REV CODE- 250/637: Performed by: ALLERGY & IMMUNOLOGY

## 2021-12-30 PROCEDURE — 74011250636 HC RX REV CODE- 250/636: Performed by: ALLERGY & IMMUNOLOGY

## 2021-12-30 PROCEDURE — 74011250636 HC RX REV CODE- 250/636

## 2021-12-30 RX ORDER — SODIUM CHLORIDE 0.9 % (FLUSH) 0.9 %
10 SYRINGE (ML) INJECTION AS NEEDED
Status: CANCELLED | OUTPATIENT
Start: 2022-01-27

## 2021-12-30 RX ORDER — DIPHENHYDRAMINE HCL 25 MG
25 CAPSULE ORAL ONCE
Status: CANCELLED
Start: 2022-01-27 | End: 2022-01-27

## 2021-12-30 RX ORDER — ALBUTEROL SULFATE 0.83 MG/ML
2.5 SOLUTION RESPIRATORY (INHALATION) AS NEEDED
Status: CANCELLED
Start: 2022-01-27

## 2021-12-30 RX ORDER — ONDANSETRON 2 MG/ML
8 INJECTION INTRAMUSCULAR; INTRAVENOUS AS NEEDED
Status: CANCELLED | OUTPATIENT
Start: 2022-01-27

## 2021-12-30 RX ORDER — HEPARIN 100 UNIT/ML
300-500 SYRINGE INTRAVENOUS AS NEEDED
Status: CANCELLED
Start: 2022-01-27

## 2021-12-30 RX ORDER — ACETAMINOPHEN 325 MG/1
650 TABLET ORAL ONCE
Status: CANCELLED
Start: 2022-01-27 | End: 2022-01-27

## 2021-12-30 RX ORDER — SODIUM CHLORIDE 9 MG/ML
25 INJECTION, SOLUTION INTRAVENOUS CONTINUOUS
Status: DISPENSED | OUTPATIENT
Start: 2021-12-30 | End: 2021-12-30

## 2021-12-30 RX ORDER — SODIUM CHLORIDE 9 MG/ML
25 INJECTION, SOLUTION INTRAVENOUS CONTINUOUS
Status: CANCELLED | OUTPATIENT
Start: 2022-01-27

## 2021-12-30 RX ORDER — EPINEPHRINE 1 MG/ML
0.3 INJECTION, SOLUTION, CONCENTRATE INTRAVENOUS AS NEEDED
Status: CANCELLED | OUTPATIENT
Start: 2022-01-27

## 2021-12-30 RX ORDER — ACETAMINOPHEN 325 MG/1
650 TABLET ORAL AS NEEDED
Status: CANCELLED
Start: 2022-01-27

## 2021-12-30 RX ORDER — ACETAMINOPHEN 325 MG/1
650 TABLET ORAL ONCE
Status: COMPLETED | OUTPATIENT
Start: 2021-12-30 | End: 2021-12-30

## 2021-12-30 RX ORDER — DIPHENHYDRAMINE HYDROCHLORIDE 50 MG/ML
50 INJECTION, SOLUTION INTRAMUSCULAR; INTRAVENOUS AS NEEDED
Status: CANCELLED
Start: 2022-01-27

## 2021-12-30 RX ORDER — SODIUM CHLORIDE 9 MG/ML
10 INJECTION INTRAMUSCULAR; INTRAVENOUS; SUBCUTANEOUS AS NEEDED
Status: CANCELLED | OUTPATIENT
Start: 2022-01-27

## 2021-12-30 RX ORDER — SODIUM CHLORIDE 0.9 % (FLUSH) 0.9 %
10 SYRINGE (ML) INJECTION AS NEEDED
Status: DISPENSED | OUTPATIENT
Start: 2021-12-30 | End: 2021-12-30

## 2021-12-30 RX ORDER — DIPHENHYDRAMINE HYDROCHLORIDE 50 MG/ML
25 INJECTION, SOLUTION INTRAMUSCULAR; INTRAVENOUS AS NEEDED
Status: CANCELLED
Start: 2022-01-27

## 2021-12-30 RX ORDER — HYDROCORTISONE SODIUM SUCCINATE 100 MG/2ML
100 INJECTION, POWDER, FOR SOLUTION INTRAMUSCULAR; INTRAVENOUS AS NEEDED
Status: CANCELLED | OUTPATIENT
Start: 2022-01-27

## 2021-12-30 RX ADMIN — Medication 10 ML: at 08:26

## 2021-12-30 RX ADMIN — ACETAMINOPHEN 650 MG: 325 TABLET ORAL at 08:21

## 2021-12-30 RX ADMIN — Medication 10 ML: at 11:08

## 2021-12-30 RX ADMIN — IMMUNE GLOBULIN (HUMAN) 20 G: 10 INJECTION INTRAVENOUS; SUBCUTANEOUS at 08:50

## 2021-12-30 RX ADMIN — IMMUNE GLOBULIN (HUMAN) 10 G: 10 INJECTION INTRAVENOUS; SUBCUTANEOUS at 10:41

## 2021-12-30 NOTE — PROGRESS NOTES
[facility] Progress Note    Date: 2021    Name: Olivia Narvaez    MRN: [id number]         : 1950      Ms. Gregorio Avendaño arrived to [place] at [address] [facility], here for Q4 week IVIG infusion. Ms. Gregorio Avendaño was assessed and education was provided. Ms. Janee Bustso vitals were reviewed. Visit Vitals  BP (!) 143/83 (BP 1 Location: Right upper arm, BP Patient Position: Sitting)   Pulse 85   Temp 97.8 °F (36.6 °C)   Resp 16   SpO2 94%       Tylenol 650mg administered as ordered for pre-medication. Pt politely refused Benadryl.    24g IV inserted in patient's right AC x1 attempt. Brisk blood return/ flushes without difficulty. IVIG 10% (Gamunex-C) 30 grams administered as ordered with the following rate titrations: 33 ml/hr x 30 minutes, 66 ml/hr x 30 minutes, 132 ml/hr x 30 minutes, and 264 ml/hr until completion. Pt's VS remained stable throughout infusion and pt denied complaints. Pt's IV flushed w/ NS. Removed/ intact. Gauze/ coban to site. Discharge/ follow-up instructions discussed w/ pt. Pt verbalized understanding. Pt's armband removed & shredded. Ms. Gregorio Avendaño was discharged from [place] [address] in stable condition at 1110. She is to return on 22 at 0800 for her next appointment.      Kodak Haines RN  2021

## 2022-01-27 ENCOUNTER — HOSPITAL ENCOUNTER (OUTPATIENT)
Dept: INFUSION THERAPY | Age: 72
Discharge: HOME | End: 2022-01-27
Payer: MEDICARE

## 2022-01-27 VITALS
RESPIRATION RATE: 16 BRPM | TEMPERATURE: 97.9 F | HEART RATE: 82 BPM | SYSTOLIC BLOOD PRESSURE: 133 MMHG | OXYGEN SATURATION: 99 % | DIASTOLIC BLOOD PRESSURE: 80 MMHG

## 2022-01-27 DIAGNOSIS — D80.1 HYPOGAMMAGLOBULINEMIA (HCC): Primary | ICD-10-CM

## 2022-01-27 PROCEDURE — 74011250637 HC RX REV CODE- 250/637: Performed by: ALLERGY & IMMUNOLOGY

## 2022-01-27 PROCEDURE — 74011250636 HC RX REV CODE- 250/636

## 2022-01-27 PROCEDURE — 74011250636 HC RX REV CODE- 250/636: Performed by: ALLERGY & IMMUNOLOGY

## 2022-01-27 PROCEDURE — 96365 THER/PROPH/DIAG IV INF INIT: CPT

## 2022-01-27 PROCEDURE — 74011000250 HC RX REV CODE- 250: Performed by: ALLERGY & IMMUNOLOGY

## 2022-01-27 PROCEDURE — 96366 THER/PROPH/DIAG IV INF ADDON: CPT

## 2022-01-27 RX ORDER — EPINEPHRINE 1 MG/ML
0.3 INJECTION, SOLUTION, CONCENTRATE INTRAVENOUS AS NEEDED
Status: CANCELLED | OUTPATIENT
Start: 2022-02-24

## 2022-01-27 RX ORDER — SODIUM CHLORIDE 0.9 % (FLUSH) 0.9 %
10 SYRINGE (ML) INJECTION AS NEEDED
Status: CANCELLED | OUTPATIENT
Start: 2022-02-24

## 2022-01-27 RX ORDER — DIPHENHYDRAMINE HYDROCHLORIDE 50 MG/ML
25 INJECTION, SOLUTION INTRAMUSCULAR; INTRAVENOUS AS NEEDED
Status: CANCELLED
Start: 2022-02-24

## 2022-01-27 RX ORDER — ALBUTEROL SULFATE 0.83 MG/ML
2.5 SOLUTION RESPIRATORY (INHALATION) AS NEEDED
Status: CANCELLED
Start: 2022-02-24

## 2022-01-27 RX ORDER — DIPHENHYDRAMINE HCL 25 MG
25 CAPSULE ORAL ONCE
Status: ACTIVE | OUTPATIENT
Start: 2022-01-27 | End: 2022-01-27

## 2022-01-27 RX ORDER — ACETAMINOPHEN 325 MG/1
650 TABLET ORAL ONCE
Status: CANCELLED
Start: 2022-02-24 | End: 2022-02-24

## 2022-01-27 RX ORDER — SODIUM CHLORIDE 9 MG/ML
10 INJECTION INTRAMUSCULAR; INTRAVENOUS; SUBCUTANEOUS AS NEEDED
Status: CANCELLED | OUTPATIENT
Start: 2022-02-24

## 2022-01-27 RX ORDER — DIPHENHYDRAMINE HCL 25 MG
25 CAPSULE ORAL ONCE
Status: CANCELLED
Start: 2022-02-24 | End: 2022-02-24

## 2022-01-27 RX ORDER — SODIUM CHLORIDE 0.9 % (FLUSH) 0.9 %
10 SYRINGE (ML) INJECTION AS NEEDED
Status: DISPENSED | OUTPATIENT
Start: 2022-01-27 | End: 2022-01-27

## 2022-01-27 RX ORDER — HEPARIN 100 UNIT/ML
300-500 SYRINGE INTRAVENOUS AS NEEDED
Status: CANCELLED
Start: 2022-02-24

## 2022-01-27 RX ORDER — SODIUM CHLORIDE 9 MG/ML
25 INJECTION, SOLUTION INTRAVENOUS CONTINUOUS
Status: DISPENSED | OUTPATIENT
Start: 2022-01-27 | End: 2022-01-27

## 2022-01-27 RX ORDER — DIPHENHYDRAMINE HYDROCHLORIDE 50 MG/ML
50 INJECTION, SOLUTION INTRAMUSCULAR; INTRAVENOUS AS NEEDED
Status: CANCELLED
Start: 2022-02-24

## 2022-01-27 RX ORDER — HYDROCORTISONE SODIUM SUCCINATE 100 MG/2ML
100 INJECTION, POWDER, FOR SOLUTION INTRAMUSCULAR; INTRAVENOUS AS NEEDED
Status: CANCELLED | OUTPATIENT
Start: 2022-02-24

## 2022-01-27 RX ORDER — ACETAMINOPHEN 325 MG/1
650 TABLET ORAL ONCE
Status: COMPLETED | OUTPATIENT
Start: 2022-01-27 | End: 2022-01-27

## 2022-01-27 RX ORDER — ONDANSETRON 2 MG/ML
8 INJECTION INTRAMUSCULAR; INTRAVENOUS AS NEEDED
Status: CANCELLED | OUTPATIENT
Start: 2022-02-24

## 2022-01-27 RX ORDER — ACETAMINOPHEN 325 MG/1
650 TABLET ORAL AS NEEDED
Status: CANCELLED
Start: 2022-02-24

## 2022-01-27 RX ORDER — SODIUM CHLORIDE 9 MG/ML
25 INJECTION, SOLUTION INTRAVENOUS CONTINUOUS
Status: CANCELLED | OUTPATIENT
Start: 2022-02-24

## 2022-01-27 RX ADMIN — SODIUM CHLORIDE, PRESERVATIVE FREE 10 ML: 5 INJECTION INTRAVENOUS at 11:32

## 2022-01-27 RX ADMIN — IMMUNE GLOBULIN (HUMAN) 20 G: 10 INJECTION INTRAVENOUS; SUBCUTANEOUS at 10:25

## 2022-01-27 RX ADMIN — ACETAMINOPHEN 650 MG: 325 TABLET ORAL at 08:33

## 2022-01-27 RX ADMIN — IMMUNE GLOBULIN (HUMAN) 10 G: 10 INJECTION INTRAVENOUS; SUBCUTANEOUS at 09:05

## 2022-01-27 RX ADMIN — SODIUM CHLORIDE 25 ML/HR: 9 INJECTION, SOLUTION INTRAVENOUS at 08:35

## 2022-01-27 NOTE — PROGRESS NOTES
[facility] Progress Note    Date: 2022    Name: Marck Ji    MRN: [id number]         : 1950      Ms. Savanna Carnes arrived to [facility] at 0815, here for Q4 week IVIG infusion. Ms. Savanna Carnes was assessed and education was provided. Ms. Willis Limon vitals were reviewed. Visit Vitals  BP (!) 148/85 (BP 1 Location: Left upper arm, BP Patient Position: Sitting)   Pulse 88   Temp 97.4 °F (36.3 °C)   Resp 16   SpO2 100%   Breastfeeding No       Tylenol 650mg administered as ordered for pre-medication. Pt politely refused Benadryl.    24g IV inserted in patient's Left AC x1 attempt. Brisk blood return/ flushes without difficulty. IVIG 10% (Gamunex-C) 30 grams administered as ordered with the following rate titrations: 33 ml/hr x 30 minutes, 66 ml/hr x 30 minutes, 132 ml/hr x 30 minutes, and 264 ml/hr until completion. Pt's VS remained stable throughout infusion and pt denied complaints. Pt's IV flushed w/ NS. Removed/ intact. Gauze/ coban to site. Discharge/ follow-up instructions discussed w/ pt. Pt verbalized understanding. Pt's armband removed & shredded. Ms. Savanna Carnes was discharged from [place] [address] in stable condition at 1145. She is to return on 22 at 0800 for her next appointment.        Tesha Miguel  2022

## 2022-02-24 ENCOUNTER — HOSPITAL ENCOUNTER (OUTPATIENT)
Dept: INFUSION THERAPY | Age: 72
Discharge: HOME | End: 2022-02-24
Payer: MEDICARE

## 2022-02-24 VITALS
BODY MASS INDEX: 26.98 KG/M2 | HEIGHT: 64 IN | OXYGEN SATURATION: 99 % | SYSTOLIC BLOOD PRESSURE: 133 MMHG | TEMPERATURE: 98.2 F | HEART RATE: 73 BPM | DIASTOLIC BLOOD PRESSURE: 75 MMHG | WEIGHT: 158 LBS | RESPIRATION RATE: 16 BRPM

## 2022-02-24 DIAGNOSIS — D80.1 HYPOGAMMAGLOBULINEMIA (HCC): Primary | ICD-10-CM

## 2022-02-24 PROCEDURE — 74011250636 HC RX REV CODE- 250/636: Performed by: ALLERGY & IMMUNOLOGY

## 2022-02-24 PROCEDURE — 74011250637 HC RX REV CODE- 250/637: Performed by: ALLERGY & IMMUNOLOGY

## 2022-02-24 PROCEDURE — 74011250636 HC RX REV CODE- 250/636

## 2022-02-24 PROCEDURE — 96365 THER/PROPH/DIAG IV INF INIT: CPT

## 2022-02-24 PROCEDURE — 96366 THER/PROPH/DIAG IV INF ADDON: CPT

## 2022-02-24 RX ORDER — SODIUM CHLORIDE 9 MG/ML
25 INJECTION, SOLUTION INTRAVENOUS CONTINUOUS
Status: CANCELLED | OUTPATIENT
Start: 2022-03-24

## 2022-02-24 RX ORDER — HYDROCORTISONE SODIUM SUCCINATE 100 MG/2ML
100 INJECTION, POWDER, FOR SOLUTION INTRAMUSCULAR; INTRAVENOUS AS NEEDED
Status: CANCELLED | OUTPATIENT
Start: 2022-03-24

## 2022-02-24 RX ORDER — ONDANSETRON 2 MG/ML
8 INJECTION INTRAMUSCULAR; INTRAVENOUS AS NEEDED
Status: CANCELLED | OUTPATIENT
Start: 2022-03-24

## 2022-02-24 RX ORDER — DIPHENHYDRAMINE HCL 25 MG
25 CAPSULE ORAL ONCE
Status: CANCELLED
Start: 2022-03-24 | End: 2022-03-24

## 2022-02-24 RX ORDER — ALBUTEROL SULFATE 0.83 MG/ML
2.5 SOLUTION RESPIRATORY (INHALATION) AS NEEDED
Status: CANCELLED
Start: 2022-03-24

## 2022-02-24 RX ORDER — SODIUM CHLORIDE 0.9 % (FLUSH) 0.9 %
10 SYRINGE (ML) INJECTION AS NEEDED
Status: CANCELLED | OUTPATIENT
Start: 2022-03-24

## 2022-02-24 RX ORDER — ACETAMINOPHEN 325 MG/1
650 TABLET ORAL AS NEEDED
Status: CANCELLED
Start: 2022-03-24

## 2022-02-24 RX ORDER — ACETAMINOPHEN 325 MG/1
650 TABLET ORAL ONCE
Status: COMPLETED | OUTPATIENT
Start: 2022-02-24 | End: 2022-02-24

## 2022-02-24 RX ORDER — DIPHENHYDRAMINE HYDROCHLORIDE 50 MG/ML
25 INJECTION, SOLUTION INTRAMUSCULAR; INTRAVENOUS AS NEEDED
Status: CANCELLED
Start: 2022-03-24

## 2022-02-24 RX ORDER — SODIUM CHLORIDE 9 MG/ML
10 INJECTION INTRAMUSCULAR; INTRAVENOUS; SUBCUTANEOUS AS NEEDED
Status: CANCELLED | OUTPATIENT
Start: 2022-03-24

## 2022-02-24 RX ORDER — DIPHENHYDRAMINE HYDROCHLORIDE 50 MG/ML
50 INJECTION, SOLUTION INTRAMUSCULAR; INTRAVENOUS AS NEEDED
Status: CANCELLED
Start: 2022-03-24

## 2022-02-24 RX ORDER — DIPHENHYDRAMINE HCL 25 MG
25 CAPSULE ORAL ONCE
Status: ACTIVE | OUTPATIENT
Start: 2022-02-24 | End: 2022-02-24

## 2022-02-24 RX ORDER — SODIUM CHLORIDE 9 MG/ML
25 INJECTION, SOLUTION INTRAVENOUS CONTINUOUS
Status: DISPENSED | OUTPATIENT
Start: 2022-02-24 | End: 2022-02-24

## 2022-02-24 RX ORDER — EPINEPHRINE 1 MG/ML
0.3 INJECTION, SOLUTION, CONCENTRATE INTRAVENOUS AS NEEDED
Status: CANCELLED | OUTPATIENT
Start: 2022-03-24

## 2022-02-24 RX ORDER — HEPARIN 100 UNIT/ML
300-500 SYRINGE INTRAVENOUS AS NEEDED
Status: CANCELLED
Start: 2022-03-24

## 2022-02-24 RX ORDER — ACETAMINOPHEN 325 MG/1
650 TABLET ORAL ONCE
Status: CANCELLED
Start: 2022-03-24 | End: 2022-03-24

## 2022-02-24 RX ADMIN — IMMUNE GLOBULIN (HUMAN) 20 G: 10 INJECTION INTRAVENOUS; SUBCUTANEOUS at 10:17

## 2022-02-24 RX ADMIN — ACETAMINOPHEN 650 MG: 325 TABLET ORAL at 08:28

## 2022-02-24 RX ADMIN — IMMUNE GLOBULIN (HUMAN) 10 G: 10 INJECTION INTRAVENOUS; SUBCUTANEOUS at 08:39

## 2022-02-24 NOTE — PROGRESS NOTES
[facility] Progress Note    Date: 2022    Name: Laina Mari    MRN: [id number]         : 1950     IVIG    Ms. Sheryl Magana arrived in the [facility] today, at [facility], in stable condition, here for her IVIG Infusion (Every 4 Weeks). She was assessed and education was provided. Ms. Gisell Kemp vitals were reviewed. Visit Vitals  /75   Pulse 73   Temp 98.2 °F (36.8 °C)   Resp 16   Ht 5' 4\" (1.626 m)   Wt 71.7 kg (158 lb)   SpO2 99%   Breastfeeding No   BMI 27.12 kg/m²       PIV # 25 G was established in her left AC without incident, and brisk blood return was obtained. NO LABS DUE TODAY. (IgG level is due every 6 months--last drawn on 10/7/21, so not due again until 2022.)    The following pre-medication was administered per order, approximately 30 minutes prior to starting the IVIG: TYLENOL 650 mg PO. However, Ms. Chey Galarza declined the PO BENADRYL 25 mg. IVIG 30 grams (Immune Globulin 10 %) (GAMUNEX-C) , was administered IV, per order, and without incident, over approximately 2 hours. The IVIG was infused with the following rate titration: 33 ml/hr X 30 minutes, 66 ml/hr X 30 minutes, 132 ml/hr X 30 minutes, & 264 ml/hr until completion. After completion of the IVIG, the PIV was flushed very well per protocol with NS, and then, the PIV was removed and gauze/coban was applied. Ms. Sheryl Magana tolerated well, and had no complaints. Discharge instructions give, patient gave verbal understanding. Ms. Sheryl Magana was discharged from [place] [address] in stable condition at 1130. Janie Cabrera She is to return in 4 weeks, on 3/24/22, at 0800,  for her next appointment, for her next IVIG Infusion.      Pipe Desai RN   2022

## 2022-03-24 ENCOUNTER — HOSPITAL ENCOUNTER (OUTPATIENT)
Dept: INFUSION THERAPY | Age: 72
Discharge: HOME | End: 2022-03-24
Payer: MEDICARE

## 2022-03-24 VITALS
DIASTOLIC BLOOD PRESSURE: 80 MMHG | SYSTOLIC BLOOD PRESSURE: 139 MMHG | RESPIRATION RATE: 16 BRPM | HEART RATE: 86 BPM | TEMPERATURE: 98.1 F | OXYGEN SATURATION: 100 %

## 2022-03-24 DIAGNOSIS — D80.1 HYPOGAMMAGLOBULINEMIA (HCC): Primary | ICD-10-CM

## 2022-03-24 LAB — IGG SERPL-MCNC: 613 MG/DL (ref 700–1600)

## 2022-03-24 PROCEDURE — 96365 THER/PROPH/DIAG IV INF INIT: CPT

## 2022-03-24 PROCEDURE — 96366 THER/PROPH/DIAG IV INF ADDON: CPT

## 2022-03-24 PROCEDURE — 74011250637 HC RX REV CODE- 250/637: Performed by: ALLERGY & IMMUNOLOGY

## 2022-03-24 PROCEDURE — 82784 ASSAY IGA/IGD/IGG/IGM EACH: CPT

## 2022-03-24 PROCEDURE — 74011000250 HC RX REV CODE- 250: Performed by: ALLERGY & IMMUNOLOGY

## 2022-03-24 PROCEDURE — 74011250636 HC RX REV CODE- 250/636

## 2022-03-24 PROCEDURE — 74011250636 HC RX REV CODE- 250/636: Performed by: ALLERGY & IMMUNOLOGY

## 2022-03-24 RX ORDER — DIPHENHYDRAMINE HYDROCHLORIDE 50 MG/ML
50 INJECTION, SOLUTION INTRAMUSCULAR; INTRAVENOUS AS NEEDED
Status: CANCELLED
Start: 2022-03-24

## 2022-03-24 RX ORDER — HEPARIN 100 UNIT/ML
300-500 SYRINGE INTRAVENOUS AS NEEDED
Status: CANCELLED
Start: 2022-03-24

## 2022-03-24 RX ORDER — MULTIVITAMIN
TABLET ORAL DAILY
COMMUNITY

## 2022-03-24 RX ORDER — DIPHENHYDRAMINE HCL 25 MG
25 CAPSULE ORAL ONCE
Status: ACTIVE | OUTPATIENT
Start: 2022-03-24 | End: 2022-03-24

## 2022-03-24 RX ORDER — EPINEPHRINE 1 MG/ML
0.3 INJECTION, SOLUTION, CONCENTRATE INTRAVENOUS AS NEEDED
Status: CANCELLED | OUTPATIENT
Start: 2022-03-24

## 2022-03-24 RX ORDER — ALBUTEROL SULFATE 0.83 MG/ML
2.5 SOLUTION RESPIRATORY (INHALATION) AS NEEDED
Status: CANCELLED
Start: 2022-03-24

## 2022-03-24 RX ORDER — HYDROCORTISONE SODIUM SUCCINATE 100 MG/2ML
100 INJECTION, POWDER, FOR SOLUTION INTRAMUSCULAR; INTRAVENOUS AS NEEDED
Status: CANCELLED | OUTPATIENT
Start: 2022-03-24

## 2022-03-24 RX ORDER — ACETAMINOPHEN 325 MG/1
650 TABLET ORAL ONCE
Status: CANCELLED
Start: 2022-03-24 | End: 2022-03-24

## 2022-03-24 RX ORDER — DIPHENHYDRAMINE HYDROCHLORIDE 50 MG/ML
25 INJECTION, SOLUTION INTRAMUSCULAR; INTRAVENOUS AS NEEDED
Status: CANCELLED
Start: 2022-03-24

## 2022-03-24 RX ORDER — ACETAMINOPHEN 325 MG/1
650 TABLET ORAL ONCE
Status: COMPLETED | OUTPATIENT
Start: 2022-03-24 | End: 2022-03-24

## 2022-03-24 RX ORDER — SODIUM CHLORIDE 0.9 % (FLUSH) 0.9 %
10 SYRINGE (ML) INJECTION AS NEEDED
Status: CANCELLED | OUTPATIENT
Start: 2022-03-24

## 2022-03-24 RX ORDER — SODIUM CHLORIDE 9 MG/ML
25 INJECTION, SOLUTION INTRAVENOUS CONTINUOUS
Status: CANCELLED | OUTPATIENT
Start: 2022-03-24

## 2022-03-24 RX ORDER — DIPHENHYDRAMINE HCL 25 MG
25 CAPSULE ORAL ONCE
Status: CANCELLED
Start: 2022-03-24 | End: 2022-03-24

## 2022-03-24 RX ORDER — SODIUM CHLORIDE 0.9 % (FLUSH) 0.9 %
10 SYRINGE (ML) INJECTION AS NEEDED
Status: DISPENSED | OUTPATIENT
Start: 2022-03-24 | End: 2022-03-24

## 2022-03-24 RX ORDER — SODIUM CHLORIDE 9 MG/ML
25 INJECTION, SOLUTION INTRAVENOUS CONTINUOUS
Status: DISPENSED | OUTPATIENT
Start: 2022-03-24 | End: 2022-03-24

## 2022-03-24 RX ORDER — ONDANSETRON 2 MG/ML
8 INJECTION INTRAMUSCULAR; INTRAVENOUS AS NEEDED
Status: CANCELLED | OUTPATIENT
Start: 2022-03-24

## 2022-03-24 RX ORDER — SODIUM CHLORIDE 9 MG/ML
10 INJECTION INTRAMUSCULAR; INTRAVENOUS; SUBCUTANEOUS AS NEEDED
Status: CANCELLED | OUTPATIENT
Start: 2022-03-24

## 2022-03-24 RX ORDER — ACETAMINOPHEN 325 MG/1
650 TABLET ORAL AS NEEDED
Status: CANCELLED
Start: 2022-03-24

## 2022-03-24 RX ADMIN — SODIUM CHLORIDE 25 ML/HR: 9 INJECTION, SOLUTION INTRAVENOUS at 08:25

## 2022-03-24 RX ADMIN — IMMUNE GLOBULIN (HUMAN) 20 G: 10 INJECTION INTRAVENOUS; SUBCUTANEOUS at 10:10

## 2022-03-24 RX ADMIN — SODIUM CHLORIDE, PRESERVATIVE FREE 10 ML: 5 INJECTION INTRAVENOUS at 08:15

## 2022-03-24 RX ADMIN — ACETAMINOPHEN 650 MG: 325 TABLET ORAL at 08:22

## 2022-03-24 RX ADMIN — IMMUNE GLOBULIN (HUMAN) 10 G: 10 INJECTION INTRAVENOUS; SUBCUTANEOUS at 08:45

## 2022-03-24 NOTE — PROGRESS NOTES
[facility] Progress Note    Date: 2022    Name: Linda Izquierdo    MRN: [id number]         : 1950      Ms. Quintin Callejas arrived in the [facility] today, at 0800, in stable condition, here for her IVIG Infusion (Every 4 Weeks). She was assessed and education was provided. Ms. Cherise Clay vitals were reviewed. Visit Vitals  BP (!) 151/88 (BP 1 Location: Right upper arm, BP Patient Position: Sitting)   Pulse 92   Temp 98.5 °F (36.9 °C)   Resp 16   SpO2 100%   Breastfeeding No         IgG LAB DUE TODAY. (IgG level is due every 6 months--last drawn on 10-7-21)        PIV # 25 G was established in her right AC at 0815 without incident, and brisk blood return was obtained. Blood for the ordered IgG level was drawn, and was sent out to the [facility] lab for processing. The following pre-medication was administered per order, approximately 30 minutes prior to starting the IVIG: TYLENOL 650 mg PO. However, Ms. Rowan Brito declined the PO BENADRYL 25 mg.       ml IV Bag was hung to infuse @ KVO PRN throughout treatment today. IVIG 30 grams (Immune Globulin 10 %) (GAMUNEX-C) , was administered IV, per order, and without incident, over approximately 2 hours. The IVIG was infused with the following rate titration: 33 ml/hr X 30 minutes, 66 ml/hr X 30 minutes, 132 ml/hr X 30 minutes, & 264 ml/hr until completion. After completion of the IVIG, the PIV was flushed very well per protocol with NS, and then, the PIV was removed and gauze/coban was applied. Ms. Quintin Callejas tolerated well, and had no complaints. Ms. Quintin Callejas was discharged from [place] [address] in stable condition at 1130. She is to return in 4 weeks, on Thursday, 22, at 0800,  for her next appointment, for her next IVIG Infusion.      Kg Reis RN   2022  8:30 AM

## 2022-04-21 ENCOUNTER — HOSPITAL ENCOUNTER (OUTPATIENT)
Dept: INFUSION THERAPY | Age: 72
Discharge: HOME | End: 2022-04-21
Payer: MEDICARE

## 2022-04-21 VITALS
SYSTOLIC BLOOD PRESSURE: 144 MMHG | OXYGEN SATURATION: 100 % | TEMPERATURE: 97.1 F | WEIGHT: 154 LBS | DIASTOLIC BLOOD PRESSURE: 81 MMHG | RESPIRATION RATE: 16 BRPM | HEART RATE: 75 BPM | BODY MASS INDEX: 26.43 KG/M2

## 2022-04-21 DIAGNOSIS — D80.1 HYPOGAMMAGLOBULINEMIA (HCC): Primary | ICD-10-CM

## 2022-04-21 PROCEDURE — 96365 THER/PROPH/DIAG IV INF INIT: CPT

## 2022-04-21 PROCEDURE — 96366 THER/PROPH/DIAG IV INF ADDON: CPT

## 2022-04-21 PROCEDURE — 74011250636 HC RX REV CODE- 250/636: Performed by: ALLERGY & IMMUNOLOGY

## 2022-04-21 PROCEDURE — 74011000250 HC RX REV CODE- 250: Performed by: ALLERGY & IMMUNOLOGY

## 2022-04-21 PROCEDURE — 74011250637 HC RX REV CODE- 250/637: Performed by: ALLERGY & IMMUNOLOGY

## 2022-04-21 RX ORDER — DIPHENHYDRAMINE HYDROCHLORIDE 50 MG/ML
25 INJECTION, SOLUTION INTRAMUSCULAR; INTRAVENOUS AS NEEDED
Status: CANCELLED
Start: 2022-05-19

## 2022-04-21 RX ORDER — ACETAMINOPHEN 325 MG/1
650 TABLET ORAL AS NEEDED
Status: CANCELLED
Start: 2022-05-19

## 2022-04-21 RX ORDER — SODIUM CHLORIDE 0.9 % (FLUSH) 0.9 %
10 SYRINGE (ML) INJECTION AS NEEDED
Status: CANCELLED | OUTPATIENT
Start: 2022-05-19

## 2022-04-21 RX ORDER — EPINEPHRINE 1 MG/ML
0.3 INJECTION, SOLUTION, CONCENTRATE INTRAVENOUS AS NEEDED
Status: CANCELLED | OUTPATIENT
Start: 2022-05-19

## 2022-04-21 RX ORDER — ALBUTEROL SULFATE 0.83 MG/ML
2.5 SOLUTION RESPIRATORY (INHALATION) AS NEEDED
Status: CANCELLED
Start: 2022-05-19

## 2022-04-21 RX ORDER — ONDANSETRON 2 MG/ML
8 INJECTION INTRAMUSCULAR; INTRAVENOUS AS NEEDED
Status: CANCELLED | OUTPATIENT
Start: 2022-05-19

## 2022-04-21 RX ORDER — HYDROCORTISONE SODIUM SUCCINATE 100 MG/2ML
100 INJECTION, POWDER, FOR SOLUTION INTRAMUSCULAR; INTRAVENOUS AS NEEDED
Status: CANCELLED | OUTPATIENT
Start: 2022-05-19

## 2022-04-21 RX ORDER — SODIUM CHLORIDE 9 MG/ML
10 INJECTION INTRAMUSCULAR; INTRAVENOUS; SUBCUTANEOUS AS NEEDED
Status: CANCELLED | OUTPATIENT
Start: 2022-05-19

## 2022-04-21 RX ORDER — HEPARIN 100 UNIT/ML
300-500 SYRINGE INTRAVENOUS AS NEEDED
Status: CANCELLED
Start: 2022-05-19

## 2022-04-21 RX ORDER — DIPHENHYDRAMINE HYDROCHLORIDE 50 MG/ML
50 INJECTION, SOLUTION INTRAMUSCULAR; INTRAVENOUS AS NEEDED
Status: CANCELLED
Start: 2022-05-19

## 2022-04-21 RX ORDER — ACETAMINOPHEN 325 MG/1
650 TABLET ORAL ONCE
Status: CANCELLED
Start: 2022-05-19 | End: 2022-05-19

## 2022-04-21 RX ORDER — DIPHENHYDRAMINE HCL 25 MG
25 CAPSULE ORAL ONCE
Status: CANCELLED
Start: 2022-05-19 | End: 2022-05-19

## 2022-04-21 RX ORDER — ACETAMINOPHEN 325 MG/1
650 TABLET ORAL ONCE
Status: COMPLETED | OUTPATIENT
Start: 2022-04-21 | End: 2022-04-21

## 2022-04-21 RX ORDER — SODIUM CHLORIDE 9 MG/ML
25 INJECTION, SOLUTION INTRAVENOUS CONTINUOUS
Status: CANCELLED | OUTPATIENT
Start: 2022-05-19

## 2022-04-21 RX ORDER — SODIUM CHLORIDE 9 MG/ML
25 INJECTION, SOLUTION INTRAVENOUS CONTINUOUS
Status: DISPENSED | OUTPATIENT
Start: 2022-04-21 | End: 2022-04-21

## 2022-04-21 RX ORDER — SODIUM CHLORIDE 0.9 % (FLUSH) 0.9 %
10 SYRINGE (ML) INJECTION AS NEEDED
Status: DISPENSED | OUTPATIENT
Start: 2022-04-21 | End: 2022-04-21

## 2022-04-21 RX ORDER — DIPHENHYDRAMINE HCL 25 MG
25 CAPSULE ORAL ONCE
Status: ACTIVE | OUTPATIENT
Start: 2022-04-21 | End: 2022-04-21

## 2022-04-21 RX ADMIN — SODIUM CHLORIDE 25 ML/HR: 9 INJECTION, SOLUTION INTRAVENOUS at 08:25

## 2022-04-21 RX ADMIN — ACETAMINOPHEN 650 MG: 325 TABLET ORAL at 08:15

## 2022-04-21 RX ADMIN — IMMUNE GLOBULIN (HUMAN) 20 G: 10 INJECTION INTRAVENOUS; SUBCUTANEOUS at 10:10

## 2022-04-21 RX ADMIN — IMMUNE GLOBULIN (HUMAN) 10 G: 10 INJECTION INTRAVENOUS; SUBCUTANEOUS at 08:45

## 2022-04-21 RX ADMIN — SODIUM CHLORIDE, PRESERVATIVE FREE 10 ML: 5 INJECTION INTRAVENOUS at 08:20

## 2022-04-21 NOTE — PROGRESS NOTES
[facility] Progress Note    Date: 2022    Name: Vicente Khan    MRN: [id number]         : 1950      Ms. Pascual Dumont arrived in the [facility] today, at 0800, in stable condition, here for her IVIG Infusion (Every 4 Weeks). She was assessed and education was provided. Ms. Conchis Murillo vitals were reviewed. Visit Vitals  BP (!) 147/75 (BP 1 Location: Right upper arm, BP Patient Position: Sitting)   Pulse 85   Temp 97.3 °F (36.3 °C)   Resp 20   Wt 69.9 kg (154 lb)   SpO2 100%   Breastfeeding No   BMI 26.43 kg/m²         IgG LAB NOT DUE TODAY. (IgG level is due every 6 months--last drawn on 3-24-22)        PIV # 25 G was established in her LEFT AC at 0820 without incident, and brisk blood return was obtained. The following pre-medication was administered per order, approximately 30 minutes prior to starting the IVIG: TYLENOL 650 mg PO. However, Ms. Augustina Salas declined the PO BENADRYL 25 mg.       ml IV Bag was hung to infuse @ KVO PRN throughout treatment today. IVIG 30 grams (Immune Globulin 10 %) (GAMUNEX-C) , was administered IV, per order, and without incident, over approximately 2 hours. The IVIG was infused with the following rate titration: 33 ml/hr X 30 minutes, 66 ml/hr X 30 minutes, 132 ml/hr X 30 minutes, & 264 ml/hr until completion. After completion of the IVIG, the PIV was flushed very well per protocol with NS, and then, the PIV was removed and gauze/coban was applied. Ms. Pascual Dumont tolerated well, and had no complaints. Ms. Pascual Dumont was discharged from [place] [address] in stable condition at 1125. She is to return in 4 weeks, on Thursday, 22, at 0800,  for her next appointment, for her next IVIG Infusion.      Ramana Toure RN   2022  8:30 AM

## 2022-05-19 ENCOUNTER — HOSPITAL ENCOUNTER (OUTPATIENT)
Dept: INFUSION THERAPY | Age: 72
Discharge: HOME | End: 2022-05-19
Payer: MEDICARE

## 2022-05-19 VITALS
OXYGEN SATURATION: 99 % | TEMPERATURE: 97 F | HEART RATE: 77 BPM | RESPIRATION RATE: 16 BRPM | DIASTOLIC BLOOD PRESSURE: 74 MMHG | SYSTOLIC BLOOD PRESSURE: 131 MMHG

## 2022-05-19 DIAGNOSIS — D80.1 HYPOGAMMAGLOBULINEMIA (HCC): Primary | ICD-10-CM

## 2022-05-19 PROCEDURE — 74011250637 HC RX REV CODE- 250/637: Performed by: ALLERGY & IMMUNOLOGY

## 2022-05-19 PROCEDURE — 74011250636 HC RX REV CODE- 250/636: Performed by: ALLERGY & IMMUNOLOGY

## 2022-05-19 PROCEDURE — 96365 THER/PROPH/DIAG IV INF INIT: CPT

## 2022-05-19 PROCEDURE — 74011000250 HC RX REV CODE- 250: Performed by: ALLERGY & IMMUNOLOGY

## 2022-05-19 PROCEDURE — 96366 THER/PROPH/DIAG IV INF ADDON: CPT

## 2022-05-19 RX ORDER — HEPARIN 100 UNIT/ML
300-500 SYRINGE INTRAVENOUS AS NEEDED
Status: CANCELLED
Start: 2022-06-16

## 2022-05-19 RX ORDER — DIPHENHYDRAMINE HYDROCHLORIDE 50 MG/ML
25 INJECTION, SOLUTION INTRAMUSCULAR; INTRAVENOUS AS NEEDED
Status: CANCELLED
Start: 2022-06-16

## 2022-05-19 RX ORDER — ALBUTEROL SULFATE 0.83 MG/ML
2.5 SOLUTION RESPIRATORY (INHALATION) AS NEEDED
Status: CANCELLED
Start: 2022-06-16

## 2022-05-19 RX ORDER — ONDANSETRON 2 MG/ML
8 INJECTION INTRAMUSCULAR; INTRAVENOUS AS NEEDED
Status: CANCELLED | OUTPATIENT
Start: 2022-06-16

## 2022-05-19 RX ORDER — HYDROCORTISONE SODIUM SUCCINATE 100 MG/2ML
100 INJECTION, POWDER, FOR SOLUTION INTRAMUSCULAR; INTRAVENOUS AS NEEDED
Status: CANCELLED | OUTPATIENT
Start: 2022-06-16

## 2022-05-19 RX ORDER — SODIUM CHLORIDE 9 MG/ML
25 INJECTION, SOLUTION INTRAVENOUS CONTINUOUS
Status: DISPENSED | OUTPATIENT
Start: 2022-05-19 | End: 2022-05-19

## 2022-05-19 RX ORDER — ACETAMINOPHEN 325 MG/1
650 TABLET ORAL ONCE
Status: CANCELLED
Start: 2022-06-16 | End: 2022-06-16

## 2022-05-19 RX ORDER — SODIUM CHLORIDE 9 MG/ML
10 INJECTION INTRAMUSCULAR; INTRAVENOUS; SUBCUTANEOUS AS NEEDED
Status: CANCELLED | OUTPATIENT
Start: 2022-06-16

## 2022-05-19 RX ORDER — ACETAMINOPHEN 325 MG/1
650 TABLET ORAL ONCE
Status: COMPLETED | OUTPATIENT
Start: 2022-05-19 | End: 2022-05-19

## 2022-05-19 RX ORDER — ACETAMINOPHEN 325 MG/1
650 TABLET ORAL AS NEEDED
Status: CANCELLED
Start: 2022-06-16

## 2022-05-19 RX ORDER — EPINEPHRINE 1 MG/ML
0.3 INJECTION, SOLUTION, CONCENTRATE INTRAVENOUS AS NEEDED
Status: CANCELLED | OUTPATIENT
Start: 2022-06-16

## 2022-05-19 RX ORDER — DIPHENHYDRAMINE HYDROCHLORIDE 50 MG/ML
50 INJECTION, SOLUTION INTRAMUSCULAR; INTRAVENOUS AS NEEDED
Status: CANCELLED
Start: 2022-06-16

## 2022-05-19 RX ORDER — SODIUM CHLORIDE 9 MG/ML
25 INJECTION, SOLUTION INTRAVENOUS CONTINUOUS
Status: CANCELLED | OUTPATIENT
Start: 2022-06-16

## 2022-05-19 RX ORDER — DIPHENHYDRAMINE HCL 25 MG
25 CAPSULE ORAL ONCE
Status: ACTIVE | OUTPATIENT
Start: 2022-05-19 | End: 2022-05-19

## 2022-05-19 RX ORDER — SODIUM CHLORIDE 0.9 % (FLUSH) 0.9 %
10 SYRINGE (ML) INJECTION AS NEEDED
Status: CANCELLED | OUTPATIENT
Start: 2022-06-16

## 2022-05-19 RX ORDER — SODIUM CHLORIDE 0.9 % (FLUSH) 0.9 %
10 SYRINGE (ML) INJECTION AS NEEDED
Status: DISPENSED | OUTPATIENT
Start: 2022-05-19 | End: 2022-05-19

## 2022-05-19 RX ORDER — DIPHENHYDRAMINE HCL 25 MG
25 CAPSULE ORAL ONCE
Status: CANCELLED
Start: 2022-06-16 | End: 2022-06-16

## 2022-05-19 RX ADMIN — IMMUNE GLOBULIN (HUMAN) 10 G: 10 INJECTION INTRAVENOUS; SUBCUTANEOUS at 10:30

## 2022-05-19 RX ADMIN — SODIUM CHLORIDE 25 ML/HR: 9 INJECTION, SOLUTION INTRAVENOUS at 08:33

## 2022-05-19 RX ADMIN — ACETAMINOPHEN 650 MG: 325 TABLET ORAL at 08:32

## 2022-05-19 RX ADMIN — SODIUM CHLORIDE, PRESERVATIVE FREE 10 ML: 5 INJECTION INTRAVENOUS at 08:28

## 2022-05-19 RX ADMIN — IMMUNE GLOBULIN (HUMAN) 20 G: 10 INJECTION INTRAVENOUS; SUBCUTANEOUS at 08:40

## 2022-05-19 NOTE — PROGRESS NOTES
[facility] Progress Note    Date: May 19, 2022    Name: Howard Schaffer    MRN: [id number]         : 1950      Ms. Marybeth Martinez arrived to [facility] at [address] [facility], here for Q4 week IVIG infusion. Ms. Marybeth Martinez was assessed and education was provided. Ms. Yohana Chaney vitals were reviewed. Visit Vitals  /76 (BP 1 Location: Left upper arm, BP Patient Position: Sitting)   Pulse 78   Temp 97.4 °F (36.3 °C)   Resp 16   SpO2 100%   Breastfeeding No     22g IV inserted in patient's right AC x1 attempt. Brisk blood return/ flushes without difficulty. Tylenol 650mg administered as ordered. Pt politely refused Benadryl. IVIG 10% (Gamunex-C) 30 grams administered as ordered with the following rate titrations: 33 ml/hr x 30 minutes, 66 ml/hr x 30 minutes, 132 ml/hr x 30 minutes, and 264 ml/hr until completion. Pt's VS remained stable throughout infusion and pt denied complaints. PIV flushed w/ NS and removed. Gauze/ coban to site. Discharge/ follow-up instructions discussed w/ pt. Pt verbalized understanding. Pt's armband removed & shredded. Ms. Marybeth Martinez was discharged from [place] [address] in stable condition at . She is to return on 6/15/22 at 0800 for her next appointment.      Desmond Castleman, RN  May 19, 2022

## 2022-06-15 ENCOUNTER — HOSPITAL ENCOUNTER (OUTPATIENT)
Dept: INFUSION THERAPY | Age: 72
Discharge: HOME | End: 2022-06-15
Payer: MEDICARE

## 2022-06-15 VITALS
HEART RATE: 79 BPM | RESPIRATION RATE: 16 BRPM | OXYGEN SATURATION: 99 % | DIASTOLIC BLOOD PRESSURE: 79 MMHG | SYSTOLIC BLOOD PRESSURE: 133 MMHG | TEMPERATURE: 97 F

## 2022-06-15 DIAGNOSIS — D80.1 HYPOGAMMAGLOBULINEMIA (HCC): Primary | ICD-10-CM

## 2022-06-15 PROCEDURE — 74011250636 HC RX REV CODE- 250/636

## 2022-06-15 PROCEDURE — 74011250636 HC RX REV CODE- 250/636: Performed by: ALLERGY & IMMUNOLOGY

## 2022-06-15 PROCEDURE — 74011000250 HC RX REV CODE- 250: Performed by: ALLERGY & IMMUNOLOGY

## 2022-06-15 PROCEDURE — 96365 THER/PROPH/DIAG IV INF INIT: CPT

## 2022-06-15 PROCEDURE — 74011250637 HC RX REV CODE- 250/637: Performed by: ALLERGY & IMMUNOLOGY

## 2022-06-15 PROCEDURE — 96366 THER/PROPH/DIAG IV INF ADDON: CPT

## 2022-06-15 RX ORDER — ACETAMINOPHEN 325 MG/1
650 TABLET ORAL AS NEEDED
Start: 2022-07-13

## 2022-06-15 RX ORDER — HEPARIN 100 UNIT/ML
300-500 SYRINGE INTRAVENOUS AS NEEDED
Start: 2022-07-13

## 2022-06-15 RX ORDER — DIPHENHYDRAMINE HCL 25 MG
25 CAPSULE ORAL ONCE
Start: 2022-07-13 | End: 2022-07-13

## 2022-06-15 RX ORDER — DIPHENHYDRAMINE HYDROCHLORIDE 50 MG/ML
25 INJECTION, SOLUTION INTRAMUSCULAR; INTRAVENOUS AS NEEDED
Start: 2022-07-13

## 2022-06-15 RX ORDER — HYDROCORTISONE SODIUM SUCCINATE 100 MG/2ML
100 INJECTION, POWDER, FOR SOLUTION INTRAMUSCULAR; INTRAVENOUS AS NEEDED
OUTPATIENT
Start: 2022-07-13

## 2022-06-15 RX ORDER — SODIUM CHLORIDE 9 MG/ML
25 INJECTION, SOLUTION INTRAVENOUS CONTINUOUS
Status: DISPENSED | OUTPATIENT
Start: 2022-06-15 | End: 2022-06-15

## 2022-06-15 RX ORDER — SODIUM CHLORIDE 0.9 % (FLUSH) 0.9 %
10 SYRINGE (ML) INJECTION AS NEEDED
OUTPATIENT
Start: 2022-07-13

## 2022-06-15 RX ORDER — SODIUM CHLORIDE 9 MG/ML
10 INJECTION INTRAMUSCULAR; INTRAVENOUS; SUBCUTANEOUS AS NEEDED
OUTPATIENT
Start: 2022-07-13

## 2022-06-15 RX ORDER — EPINEPHRINE 1 MG/ML
0.3 INJECTION, SOLUTION, CONCENTRATE INTRAVENOUS AS NEEDED
OUTPATIENT
Start: 2022-07-13

## 2022-06-15 RX ORDER — DIPHENHYDRAMINE HYDROCHLORIDE 50 MG/ML
50 INJECTION, SOLUTION INTRAMUSCULAR; INTRAVENOUS AS NEEDED
Start: 2022-07-13

## 2022-06-15 RX ORDER — ALBUTEROL SULFATE 0.83 MG/ML
2.5 SOLUTION RESPIRATORY (INHALATION) AS NEEDED
Start: 2022-07-13

## 2022-06-15 RX ORDER — ACETAMINOPHEN 325 MG/1
650 TABLET ORAL ONCE
Status: COMPLETED | OUTPATIENT
Start: 2022-06-15 | End: 2022-06-15

## 2022-06-15 RX ORDER — SODIUM CHLORIDE 9 MG/ML
25 INJECTION, SOLUTION INTRAVENOUS CONTINUOUS
OUTPATIENT
Start: 2022-07-13

## 2022-06-15 RX ORDER — SODIUM CHLORIDE 0.9 % (FLUSH) 0.9 %
10 SYRINGE (ML) INJECTION AS NEEDED
Status: DISPENSED | OUTPATIENT
Start: 2022-06-15 | End: 2022-06-15

## 2022-06-15 RX ORDER — ONDANSETRON 2 MG/ML
8 INJECTION INTRAMUSCULAR; INTRAVENOUS AS NEEDED
OUTPATIENT
Start: 2022-07-13

## 2022-06-15 RX ORDER — ACETAMINOPHEN 325 MG/1
650 TABLET ORAL ONCE
Start: 2022-07-13 | End: 2022-07-13

## 2022-06-15 RX ADMIN — IMMUNE GLOBULIN (HUMAN) 10 G: 10 INJECTION INTRAVENOUS; SUBCUTANEOUS at 10:20

## 2022-06-15 RX ADMIN — SODIUM CHLORIDE 25 ML/HR: 9 INJECTION, SOLUTION INTRAVENOUS at 08:17

## 2022-06-15 RX ADMIN — SODIUM CHLORIDE, PRESERVATIVE FREE 10 ML: 5 INJECTION INTRAVENOUS at 08:12

## 2022-06-15 RX ADMIN — ACETAMINOPHEN 650 MG: 325 TABLET ORAL at 08:16

## 2022-06-15 RX ADMIN — IMMUNE GLOBULIN (HUMAN) 20 G: 10 INJECTION INTRAVENOUS; SUBCUTANEOUS at 08:25

## 2022-06-15 NOTE — PROGRESS NOTES
[facility] Progress Note    Date: Belen 15, 2022    Name: Howard Schaffer    MRN: [id number]         : 1950      Ms. Marybeth Martinez arrived to [address] [place] [address] at 0800, here for Q4 week IVIG infusion. Ms. Marybeth Martinez was assessed and education was provided. Pt reports her back is hurting her today from moving/packing. She also reports she is getting over a sinus infection that she is currently taking Levaquin for. Ms. Yohana Chaney vitals were reviewed. Visit Vitals  /77 (BP 1 Location: Right upper arm, BP Patient Position: Sitting)   Pulse 90   Temp 97.1 °F (36.2 °C)   Resp 16   SpO2 98%   Breastfeeding No     22g IV inserted in patient's right AC x1 attempt. Brisk blood return/ flushes without difficulty. Tylenol 650mg administered as ordered. Pt politely refused Benadryl. IVIG 10% (Gamunex-C) 30 grams administered as ordered with the following rate titrations: 33 ml/hr x 30 minutes, 66 ml/hr x 30 minutes, 132 ml/hr x 30 minutes, and 264 ml/hr until completion. Pt's VS remained stable throughout infusion and pt denied complaints. PIV flushed w/ NS and removed. Gauze/ coban to site. Discharge/ follow-up instructions discussed w/ pt. Pt verbalized understanding. Pt's armband removed & shredded. Ms. Marybeth Martinez was discharged from [place] [address] in stable condition at . She has no further appointments at this time.     Desmond Castleman, RN  Belen 15, 2022